# Patient Record
Sex: FEMALE | Race: WHITE | NOT HISPANIC OR LATINO | Employment: OTHER | URBAN - METROPOLITAN AREA
[De-identification: names, ages, dates, MRNs, and addresses within clinical notes are randomized per-mention and may not be internally consistent; named-entity substitution may affect disease eponyms.]

---

## 2017-06-22 ENCOUNTER — ALLSCRIPTS OFFICE VISIT (OUTPATIENT)
Dept: OTHER | Facility: OTHER | Age: 62
End: 2017-06-22

## 2017-10-03 ENCOUNTER — GENERIC CONVERSION - ENCOUNTER (OUTPATIENT)
Dept: OTHER | Facility: OTHER | Age: 62
End: 2017-10-03

## 2017-11-27 ENCOUNTER — ALLSCRIPTS OFFICE VISIT (OUTPATIENT)
Dept: OTHER | Facility: OTHER | Age: 62
End: 2017-11-27

## 2017-11-27 DIAGNOSIS — Z12.39 ENCOUNTER FOR OTHER SCREENING FOR MALIGNANT NEOPLASM OF BREAST: ICD-10-CM

## 2017-12-19 ENCOUNTER — GENERIC CONVERSION - ENCOUNTER (OUTPATIENT)
Dept: OTHER | Facility: OTHER | Age: 62
End: 2017-12-19

## 2017-12-21 DIAGNOSIS — E78.5 HYPERLIPIDEMIA: ICD-10-CM

## 2017-12-21 DIAGNOSIS — Z13.0 ENCOUNTER FOR SCREENING FOR DISEASES OF THE BLOOD AND BLOOD-FORMING ORGANS AND CERTAIN DISORDERS INVOLVING THE IMMUNE MECHANISM: ICD-10-CM

## 2018-01-10 NOTE — PROGRESS NOTES
Assessment    1  Encounter for preventive health examination (V70 0) (Z00 00)   2  Encounter for other screening for malignant neoplasm of breast (V76 19) (Z12 39)   3  Screening for malignant neoplasm of cervix (V76 2) (Z12 4)   4  Need for vaccination (V05 9) (Z23)   5  Need for hepatitis C screening test (V73 89) (Z11 59)    Plan  Encounter for other screening for malignant neoplasm of breast    · * MAMMO SCREENING BILATERAL W CAD; Status:Hold For - Scheduling; Requested  for:27Nov2017; Health Maintenance    · *VB-Depression Screening; Status:Resulted - Requires Verification;   Done: 25YUU2167  01:21PM   · After you empty your bladder, wait a moment and try to go again  Do not strain or push to  empty ; Status:Active; Requested for:27Nov2017;    · Always use a seat belt and shoulder strap when riding or driving a motor vehicle ;  Status:Complete;   Done: 85ELS0280 01:28PM   · Begin a bladder training program to help you control your urgency   Start by urinating  every 2 hours ; Status:Complete;   Done: 00KII9839 01:28PM   · Brush your teeth {freq1} and floss at least once a day ; Status:Complete;   Done:  16DMD9383 01:28PM   · Diets that are low in carbohydrates and high in protein are very popular for weight loss ;  Status:Complete;   Done: 05PSZ6635 01:28PM   · Eat a low fat and low cholesterol diet ; Status:Complete;   Done: 69AQN0667 01:28PM   · Eat foods that are high in calcium ; Status:Complete;   Done: 29SAI6007 01:28PM   · Eat no more than 30 grams of fat per day ; Status:Complete;   Done: 17YYG3431  01:28PM   · Regular aerobic exercise can help reduce stress ; Status:Complete;   Done: 41BLW2684  01:28PM   · Some eating tips that can help you lose weight ; Status:Complete;   Done: 93RBT1373  01:28PM   · Stretch and warm up your muscles during the first 10 minutes , then cool down your  muscles for the last 10 minutes of exercise ; Status:Complete;   Done: 92LQD0119  01:28PM   · The plan of care for urinary incontinence is detailed in the plan and/or discussion section  of today's note ; Status:Complete;   Done: 27UCO8051 01:28PM   · Use a sun block product with an SPF of 15 or more ; Status:Complete;   Done:  74YUL0342 01:28PM   · We have prescribed Kegel exercises to be done in a set of 15 repetitions, 3 times a day ;  Status:Complete;   Done: 86PGD5439 01:28PM   · We recommend routine visits to a dentist ; Status:Complete;   Done: 56XPQ4145  01:28PM   · We recommend that you bring your body mass index down to 26 ; Status:Complete;    Done: 10ZVL3699 01:28PM   · We recommend that you create an advance directive ; Status:Complete;   Done:  45QBG6571 01:28PM   · Call [phone] if: You find a new or different kind of lump in your breast ;  Status:Complete;   Done: 43PIX3233 01:28PM   · Call [phone] if: You have any bleeding from the vagina ; Status:Complete;    Done: 83JWQ5726 01:28PM   · Call [phone] if: You have any warning signs of skin cancer ; Status:Complete;    Done: 33EDV7465 01:28PM   · Call 911 if: You experience a new kind of chest pain (angina) or pressure ;  Status:Complete;   Done: 39RSC0325 01:28PM  Health Maintenance, Need for hepatitis C screening test    · (Q) HEPATITIS C ANTIBODY; Status:Active; Requested for:27Nov2017;   Need for vaccination    · Tdap (Adacel); INJECT 0 5  ML Intramuscular; To Be Done: 84DOR3328   · Zoster (Zostavax); INJECT 0 5  ML Intramuscular; To Be Done: 06ASO8730  Screening for malignant neoplasm of cervix    · 2 - Betty Frausto DO (Obstetrics/Gynecology) Co-Management  *  Status: Hold For  - Scheduling  Requested for: 52QNV5660  Care Summary provided  : Yes    Discussion/Summary  health maintenance visit Currently, she has an adequate exercise regimen   the risks and benefits of cervical cancer screening were discussed cervical cancer screening is current Breast cancer screening: the risks and benefits of breast cancer screening were discussed, self breast exam technique was taught, monthly self breast exam was advised and mammogram is current  Colorectal cancer screening: the risks and benefits of colorectal cancer screening were discussed and colorectal cancer screening is current  Osteoporosis screening: the risks and benefits of osteoporosis screening were discussed  The immunizations will be given as outlined in the orders  She was advised to be evaluated by an optometrist and a dentist  Advice and education were given regarding nutrition, aerobic exercise, weight bearing exercise, weight loss, calcium supplements, vitamin D supplements, self skin examination and seat belt use  Patient discussion: discussed with the patient  Hepatitis C Screening: the patient was counseled on Hepatitis C screening  The patient agrees to Hepatitis C screening  Possible side effects of new medications were reviewed with the patient/guardian today  The treatment plan was reviewed with the patient/guardian  The patient/guardian understands and agrees with the treatment plan      Chief Complaint  wellness physical      History of Present Illness  HM, Adult Female: The patient is being seen for a health maintenance evaluation  The last health maintenance visit was 1 year(s) ago  Social History: She is   Work status: working full time  The patient has never smoked cigarettes  She reports rare alcohol use  She has never used illicit drugs  General Health: The patient's health since the last visit is described as good  She has regular dental visits  She denies vision problems  She denies hearing loss  Immunizations status: not up to date  Lifestyle:  She consumes a diverse and healthy diet  She has weight concerns  She exercises regularly  She does not use tobacco  She denies alcohol use  She denies drug use  Reproductive health:  she reports normal menses  she is not sexually active     Screening:   HPI: Patient is here for well adult PE She is in need of a new GYN She needs slip for mammogram She is up to date with colonscopy and with her labs She needs vaccines She is seeing dentist regularly and in fact had a tooth break and is having an implant      Review of Systems    Constitutional: No fever, no chills, feels well, no tiredness, no recent weight gain or weight loss  Eyes: no eye pain and no eyesight problems  ENT: no complaints of earache, no loss of hearing, no nose bleeds, no nasal discharge, no sore throat, no hoarseness  Cardiovascular: no chest pain, no intermittent leg claudication, no palpitations and no lower extremity edema  Respiratory: No complaints of shortness of breath, no wheezing, no cough, no SOB on exertion, no orthopnea, no PND  Gastrointestinal: No complaints of abdominal pain, no constipation, no nausea or vomiting, no diarrhea, no bloody stools  Genitourinary: no dysuria and no incontinence  Musculoskeletal: no arthralgias and no myalgias  Integumentary: no rashes  Neurological: No complaints of headache, no confusion, no convulsions, no numbness, no dizziness or fainting, no tingling, no limb weakness, no difficulty walking  Psychiatric: no anxiety, no sleep disturbances and no depression  Active Problems    1  Arthritis of right shoulder region (716 91) (M19 011)   2  Basal cell carcinoma of nose (173 31) (C44 311)   3  Disc degeneration, lumbar (722 52) (M51 36)   4  Hyperlipidemia (272 4) (E78 5)   5  Need for vaccination (V05 9) (Z23)   6  Obesity (278 00) (E66 9)   7   Pain of right scapula (733 90) (M89 8X1)    Past Medical History    · History of Achilles tendinitis of right lower extremity (726 71) (M76 61)   · History of hyperlipidemia (V12 29) (Z86 39)   · Denied: History of substance abuse   · History of Left-sided low back pain with left-sided sciatica (724 3) (M54 42)   · History of Sciatica (724 3) (M54 30)   · History of Spasm of lumbar paraspinous muscle (724 8) (M62 830)    Surgical History    · History of Complete Colonoscopy    Family History  Mother    · Family history of Hypertension (V17 49)   · Family history of Type 2 Diabetes Mellitus  Father    · Family history of Myocardial Ischemia  Brother    · Family history of Type 2 Diabetes Mellitus    Social History    · Never A Smoker    Current Meds   1  Celecoxib 200 MG Oral Capsule; TAKE ONE CAPSULE BY MOUTH TWICE DAILY for   one week then one a day; Therapy: 64AIA8041 to (Flory Potter)  Requested for: 51SWR1938; Last   Rx:03Oct2017 Ordered    Allergies    1  No Known Drug Allergies    Vitals   Recorded: 52HWA8095 80:46MJ   Systolic 395   Diastolic 82   Height 5 ft 4 in   Weight 186 lb    BMI Calculated 31 93   BSA Calculated 1 9     Physical Exam    Constitutional   General appearance: No acute distress, well appearing and well nourished  Head and Face   Head and face: Normal     Palpation of the face and sinuses: No sinus tenderness  Eyes   Conjunctiva and lids: No swelling, erythema or discharge  Pupils and irises: Equal, round, reactive to light  Ophthalmoscopic examination: Normal fundi and optic discs  Ears, Nose, Mouth, and Throat   External inspection of ears and nose: Normal     Otoscopic examination: Tympanic membranes translucent with normal light reflex  Canals patent without erythema  Hearing: Normal     Nasal mucosa, septum, and turbinates: Normal without edema or erythema  Lips, teeth, and gums: Normal, good dentition  Oropharynx: Normal with no erythema, edema, exudate or lesions  Neck   Neck: Supple, symmetric, trachea midline, no masses  Thyroid: Normal, no thyromegaly  Pulmonary   Respiratory effort: No increased work of breathing or signs of respiratory distress  Auscultation of lungs: Clear to auscultation  Cardiovascular   Palpation of heart: Normal PMI, no thrills  Carotid pulses: 2+ bilaterally      Examination of extremities for edema and/or varicosities: Normal     Abdomen Abdomen: Non-tender, no masses  Liver and spleen: No hepatomegaly or splenomegaly  Lymphatic   Palpation of lymph nodes in neck: No lymphadenopathy  Musculoskeletal   Gait and station: Normal     Stability: Normal     Muscle strength/tone: Normal     Skin   Skin and subcutaneous tissue: Normal without rashes or lesions  Neurologic   Cranial nerves: Cranial nerves II-XII intact  Coordination: Normal finger to nose and heel to shin  Psychiatric   Judgment and insight: Normal     Orientation to person, place, and time: Normal     Recent and remote memory: Intact      Mood and affect: Normal        Results/Data  *VB-Depression Screening 92XCR2949 01:21PM Gabby Simmons     Test Name Result Flag Reference   Depression Scale Result      Depression Screen - Negative For Symptoms       Signatures   Electronically signed by : Nickolas Nair DO; Nov 27 2017  1:29PM EST                       (Author)

## 2018-01-12 NOTE — PROGRESS NOTES
Assessment    1  Disc degeneration, lumbar (722 52) (M51 36)   2  Spasm of lumbar paraspinous muscle (724 8) (M62 830)   3  Left-sided low back pain with left-sided sciatica (724 3) (M54 42)    Plan  Disc degeneration, lumbar    · After 3 days, apply heat 4 times a day for 20 minutes ; Status:Complete;   Done:  38BNP1316   · Apply an ice pack 4 times a day for 20 minutes the first 2 days ; Status:Complete;   Done:  43CYZ0552   · Avoid activities that cause or worsen the pain ; Status:Complete;   Done: 27HWS9833   · Avoid lifting, manual work, or sports that may affect your injury for 3 weeks ;  Status:Complete;   Done: 29HJL2757   · Do not resume your normal level of activity until you have been rechecked ;  Status:Complete;   Done: 93IJU5543   · Rest the injured area as much as possible ; Status:Complete;   Done: 94NUQ8091   · There are several things you can do to help your back heal and stay healthy ;  Status:Complete;   Done: 26QFC6387   · We recommend that you do knee-to-chest exercises to stretch your back  Do this  exercise 10 times in a row, 3 times a day , and hold the stretch  for 20 seconds each time ; Status:Complete;   Done: 03ZXX2154   · We recommend that you stretch your back by doing the cat stretch  Do this exercise 10  times a day, holding the stretch for 20 seconds each time ; Status:Complete;   Done:  83MBH5216   · We recommend that you stretch your lower back muscles lying down  Do this exercise 3  times in a row, 3 times a day , and hold the stretch for 20 seconds each  time ; Status:Complete;   Done: 51DKO5313   · We recommend that you stretch your middle back  Do this exercise 5 times a day,  holding the stretch for 10 seconds each time ; Status:Complete;   Done: 69ZWC7470   · Call [phone] if: The pain is not better in 1 week ; Status:Complete;   Done:  99ETL9833   · Call [phone] if: The pain seems worse ; Status:Complete;   Done: 46GIK1172   · Call [phone] if:  The symptoms are not better in 6 weeks ; Status:Complete;    Done: 96ZIP5947   · Call [phone] if: The symptoms are suddenly worse ; Status:Complete;   Done:  60YYZ4868   · Call [phone] if: You get a rash ; Status:Complete;   Done: 20ZGH7476   · Call [phone] if: Your bowel movements are hard, difficult to push out, or if several  days have gone by without a bowel movement ; Status:Complete;   Done: 88CQW6097   · Call [phone] if: Your leg is numb, cold, or tingling ; Status:Complete;   Done:  69BBS5204   · Call [phone] if: You have any loss of bowel or bladder control ; Status:Complete;   Done:  90EQR8371   · Seek Immediate Medical Attention if: Your leg becomes weak ; Status:Complete;   Done:  11XUI5355  Disc degeneration, lumbar, Left-sided low back pain with left-sided sciatica, Spasm of  lumbar paraspinous muscle    · Celecoxib 100 MG Oral Capsule (CeleBREX); 1 tablet daily for 10 days then as  needed   · Ketorolac Tromethamine 30 MG/ML Injection Solution; inject 1  ml  intramuscular; To Be Done: 59UUR3110  Spasm of lumbar paraspinous muscle    · Cyclobenzaprine HCl - 10 MG Oral Tablet; 1 tablet every 8 hrs for 3 days then prn  pain    Discussion/Summary    Patient will continue with the stretches, see orthopedics on Thursday and have the tordol shot Patient will need to have the celebrex and muscle relaxants        Chief Complaint  HERE TODAY WITH C/O LOW BACK PAIN FROM CLEANING SNOW OFF CAR      History of Present Illness  HPI: Jose Alicia has known lumbar disc disease and follows with [facility] for it Patient has an appointment with them on Thursday Patient is having pain in the left low back that radiates to the left buttock, lateral hip and thigh Patient feels some "tingling" also Patient has no bowel or bladder complaints Patient feels her strenght is normal Patient was fine until Sunday Then she went out to clear the snow from her SUV and started with pain Patient tried her stretches and also he alleve and massage chair with no help Patient felt the worst this morning with trying to get out of bed Patient states pain is better with some movement   Back Pain (Follow-Up): The patient is being seen for follow-up of a lumbar strain and degenerative disc disease  The patient reports doing poorly  Comorbid Illnesses: sedentary  Interval symptoms:  denies upper back pain, denies mid back pain, worsened back stiffness, worsened buttock pain, denies lower extremity pain, denies lower extremity paresthesias and denies lower extremity weakness    The patient presents with complaints of sudden onset of constant episodes of moderate worsened lower back pain  Episodes started about 3 days ago  She is currently experiencing worsened lower back pain  Symptoms are unchanged  Previous Evaluation: MRI, PT and ortho  Associated symptoms:  no incontinence and no urinary retention  Pain levels include a current pain level of 7/10  Medications include nonsteroidal anti-inflammatory drugs and alleve twice daily since Sunday  Medications:  the patient is adherent to her medication regimen, but she denies medication side effects  Review of Systems    Constitutional: no fever and no chills  Gastrointestinal: no complaints of abdominal pain, no constipation, no nausea or diarrhea, no vomiting, no bloody stools  Genitourinary: no complaints of dysuria, no incontinence, no pelvic pain, no dysmenorrhea, no vaginal discharge or abnormal vaginal bleeding  Musculoskeletal: as noted in HPI  Integumentary: no rashes  Neurological: no numbness  Active Problems    1  Arthritis of right shoulder region (716 91) (M19 90)   2  Disc degeneration, lumbar (722 52) (M51 36)   3  Hyperlipidemia (272 4) (E78 5)   4  Obesity (278 00) (E66 9)   5  Upper respiratory infection (465 9) (J06 9)   6  Visit for screening mammogram (V76 12) (Z12 31)    Past Medical History    1  History of Acute sinusitis (461 9) (J01 90)   2   History of Depression (311) (F32 9)   3  History of acute pharyngitis (V12 69) (Z87 09)   4  History of acute sinusitis (V12 69) (Z87 09)   5  History of acute sinusitis (V12 69) (Z87 09)   6  History of acute sinusitis (V12 69) (Z87 09)   7  History of upper respiratory infection (V12 09) (Z87 09)   8  History of Pain in wrist, unspecified laterality (719 43) (M25 539)   9  History of Pain of finger, unspecified laterality (729 5) (M79 646)   10  History of Sciatica (724 3) (M54 30)  Active Problems And Past Medical History Reviewed: The active problems and past medical history were reviewed and updated today  Family History    1  Family history of Hypertension (V17 49)   2  Family history of Type 2 Diabetes Mellitus    3  Family history of Myocardial Ischemia    4  Family history of Type 2 Diabetes Mellitus  Family History Reviewed: The family history was reviewed and updated today  Social History    · Never A Smoker  The social history was reviewed and is unchanged  Surgical History    1  History of Complete Colonoscopy  Surgical History Reviewed: The surgical history was reviewed and updated today  Current Meds   1  Simvastatin 10 MG Oral Tablet; take 1 tablet every day; Therapy: 81WOO2052 to (Evaluate:20Jun2015)  Requested for: 25Sai4450; Last   Rx:30Kpu0173 Ordered   2  Ventolin  (90 Base) MCG/ACT Inhalation Aerosol Solution; inhale 2 puffs every 6   hrs as needed for cough and wheezing; Therapy: 40AUZ4990 to (Mikhail Calixto)  Requested for: 78HSP3611; Last   Rx:02Jan2014 Ordered    The medication list was reviewed and updated today  Allergies    1  No Known Drug Allergies    Vitals   Recorded: 98MTJ4207 12:03PM   Temperature 84 6 F   Systolic 988   Diastolic 70   Height 5 ft 4 in   Weight 194 lb 8 oz   BMI Calculated 33 39   BSA Calculated 1 93     Physical Exam    Constitutional   General appearance: No acute distress, well appearing and well nourished  Cardiovascular   Examination of extremities for edema and/or varicosities: Normal     Musculoskeletal   Gait and station: Abnormal   antalgic gait  Inspection/palpation of joints, bones, and muscles: Abnormal   pain to palpation of the left PSIS joint flexionn is limited to 30 degrees, extension to 20 and side bending/ rotation b/l at 30 degrees Patient is able to toe and heel walk  Neurologic   Cranial nerves: Cranial nerves 2-12 intact  Reflexes: 2+ and symmetric  Sensation: No sensory loss  Psychiatric   Orientation to person, place, and time: Normal     Mood and affect: Normal     Additional Exam:  5/5 strenght b/l lower extremities          Signatures   Electronically signed by : Sherie Love DO; Jan 26 2016 12:27PM EST                       (Author)

## 2018-01-13 VITALS
HEIGHT: 64 IN | SYSTOLIC BLOOD PRESSURE: 118 MMHG | WEIGHT: 181.25 LBS | BODY MASS INDEX: 30.94 KG/M2 | DIASTOLIC BLOOD PRESSURE: 78 MMHG

## 2018-01-15 VITALS
HEIGHT: 64 IN | DIASTOLIC BLOOD PRESSURE: 82 MMHG | SYSTOLIC BLOOD PRESSURE: 140 MMHG | BODY MASS INDEX: 31.76 KG/M2 | WEIGHT: 186 LBS

## 2018-01-22 VITALS
SYSTOLIC BLOOD PRESSURE: 124 MMHG | WEIGHT: 184 LBS | BODY MASS INDEX: 31.41 KG/M2 | DIASTOLIC BLOOD PRESSURE: 80 MMHG | HEIGHT: 64 IN

## 2018-01-24 VITALS
SYSTOLIC BLOOD PRESSURE: 124 MMHG | TEMPERATURE: 98.2 F | HEIGHT: 64 IN | WEIGHT: 186 LBS | DIASTOLIC BLOOD PRESSURE: 78 MMHG | BODY MASS INDEX: 31.76 KG/M2

## 2018-02-06 ENCOUNTER — OFFICE VISIT (OUTPATIENT)
Dept: FAMILY MEDICINE CLINIC | Facility: CLINIC | Age: 63
End: 2018-02-06
Payer: COMMERCIAL

## 2018-02-06 VITALS
TEMPERATURE: 98.2 F | SYSTOLIC BLOOD PRESSURE: 120 MMHG | DIASTOLIC BLOOD PRESSURE: 78 MMHG | BODY MASS INDEX: 32.06 KG/M2 | WEIGHT: 187.8 LBS | HEIGHT: 64 IN

## 2018-02-06 DIAGNOSIS — J02.9 PHARYNGITIS, UNSPECIFIED ETIOLOGY: Primary | ICD-10-CM

## 2018-02-06 LAB — S PYO AG THROAT QL: NEGATIVE

## 2018-02-06 PROCEDURE — 99213 OFFICE O/P EST LOW 20 MIN: CPT | Performed by: FAMILY MEDICINE

## 2018-02-06 PROCEDURE — 87880 STREP A ASSAY W/OPTIC: CPT | Performed by: FAMILY MEDICINE

## 2018-02-06 PROCEDURE — 3008F BODY MASS INDEX DOCD: CPT | Performed by: FAMILY MEDICINE

## 2018-02-06 RX ORDER — CELECOXIB 200 MG/1
1 CAPSULE ORAL DAILY
COMMUNITY
Start: 2017-10-03 | End: 2018-10-30

## 2018-02-06 RX ORDER — ALPRAZOLAM 0.5 MG/1
.5-1 TABLET ORAL
COMMUNITY
Start: 2016-04-08 | End: 2018-10-30

## 2018-02-06 NOTE — PROGRESS NOTES
Assessment/Plan:    No problem-specific Assessment & Plan notes found for this encounter  Diagnoses and all orders for this visit:    Pharyngitis, unspecified etiology  Comments:  strep culture is negative This appear to be viral If she gets worse or no improvement by Friday she will call Cepacol lozenges, rest and fluids are recommended  Orders:  -     POCT rapid strepA    Other orders  -     ALPRAZolam (XANAX) 0 5 mg tablet; Take 0 5-1 tablets by mouth  -     celecoxib (CeleBREX) 200 mg capsule; Take 1 capsule by mouth daily          Subjective:   Chief Complaint   Patient presents with    Fatigue    Sore Throat          Patient ID: Shon Zurita is a 65 y o  female  Patient ahs had some fatigue and congestion on and off for a ew days then over the weekend sore throat She is travelling next week and wanted to have things "checked" Patient has no fever and no discolored mucous She was in [place] and had crowns placed in the last 2 weeks      URI    This is a new problem  The current episode started in the past 7 days  The problem has been unchanged  There has been no fever  Associated symptoms include rhinorrhea and a sore throat  Pertinent negatives include no abdominal pain, chest pain, congestion, coughing, diarrhea, dysuria, ear pain, headaches, joint pain, joint swelling, nausea, neck pain, plugged ear sensation, rash, sinus pain, sneezing, swollen glands, vomiting or wheezing  She has tried antihistamine for the symptoms  The treatment provided mild relief  The following portions of the patient's history were reviewed and updated as appropriate: allergies, current medications, past social history and problem list     Review of Systems   Constitutional: Positive for fatigue  Negative for activity change, appetite change, chills, diaphoresis and fever  HENT: Positive for postnasal drip, rhinorrhea and sore throat   Negative for congestion, ear pain, sinus pain, sinus pressure, sneezing, trouble swallowing and voice change  Respiratory: Negative for cough and wheezing  Cardiovascular: Negative for chest pain  Gastrointestinal: Negative for abdominal pain, diarrhea, nausea and vomiting  Genitourinary: Negative for dysuria  Musculoskeletal: Negative for joint pain and neck pain  Skin: Negative for rash  Neurological: Negative for headaches  Objective:     Physical Exam   Constitutional: She is oriented to person, place, and time  She appears well-developed and well-nourished  HENT:   Head: Normocephalic and atraumatic  Right Ear: Tympanic membrane and external ear normal    Left Ear: Tympanic membrane and external ear normal    Nose: Rhinorrhea present  Mouth/Throat: Uvula is midline and mucous membranes are normal  Posterior oropharyngeal erythema present  No oropharyngeal exudate, posterior oropharyngeal edema or tonsillar abscesses  Eyes: Conjunctivae and EOM are normal  Pupils are equal, round, and reactive to light  Neck: Normal range of motion  Neck supple  Cardiovascular: Normal rate, regular rhythm and normal heart sounds  Pulmonary/Chest: Effort normal and breath sounds normal  She has no wheezes  She has no rales  Abdominal: Soft  Bowel sounds are normal    Lymphadenopathy:     She has no cervical adenopathy  Neurological: She is alert and oriented to person, place, and time  She has normal reflexes  Skin: No rash noted  Psychiatric: She has a normal mood and affect

## 2018-02-06 NOTE — PATIENT INSTRUCTIONS
Pharyngitis   AMBULATORY CARE:   Pharyngitis , or sore throat, is inflammation of the tissues and structures in your pharynx (throat)  Pharyngitis is most often caused by bacteria  It may also be caused by a cold or flu virus  Other causes include smoking, allergies, or acid reflux  Signs and symptoms that may occur with pharyngitis:   · Sore throat or pain when you swallow    · Fever, chills, and body aches    · Hoarse or raspy voice    · Cough, runny or stuffy nose, itchy or watery eyes    · Headache    · Upset stomach and loss of appetite    · Mild neck stiffness    · Swollen glands that feel like hard lumps when you touch your neck    · White and yellow pus-filled blisters in the back of your throat  Call 911 for any of the following:   · You have trouble breathing or swallowing because your throat is swollen or sore  Seek care immediately if:   · You are drooling because it hurts too much to swallow  · Your fever is higher than 102? F (39?C) or lasts longer than 3 days  · You are confused  · You taste blood in your throat  Contact your healthcare provider if:   · Your throat pain gets worse  · You have a painful lump in your throat that does not go away after 5 days  · Your symptoms do not improve after 5 days  · You have questions or concerns about your condition or care  Treatment for pharyngitis:  Viral pharyngitis will go away on its own without treatment  Your sore throat should start to feel better in 3 to 5 days for both viral and bacterial infections  You may need any of the following:  · Antibiotics  treat a bacterial infection  · NSAIDs , such as ibuprofen, help decrease swelling, pain, and fever  NSAIDs can cause stomach bleeding or kidney problems in certain people  If you take blood thinner medicine, always ask your healthcare provider if NSAIDs are safe for you  Always read the medicine label and follow directions  · Acetaminophen  decreases pain and fever   It is available without a doctor's order  Ask how much to take and how often to take it  Follow directions  Acetaminophen can cause liver damage if not taken correctly  Manage your symptoms:   · Gargle salt water  Mix ¼ teaspoon salt in an 8 ounce glass of warm water and gargle  This may help decrease swelling in your throat  · Drink liquids as directed  You may need to drink more liquids than usual  Liquids may help soothe your throat and prevent dehydration  Ask how much liquid to drink each day and which liquids are best for you  · Use a cool-steam humidifier  to help moisten the air in your room and calm your cough  · Soothe your throat  with cough drops, ice, soft foods, or popsicles  Prevent the spread of pharyngitis:  Cover your mouth and nose when you cough or sneeze  Do not share food or drinks  Wash your hands often  Use soap and water  If soap and water are unavailable, use an alcohol based hand   Follow up with your healthcare provider as directed:  Write down your questions so you remember to ask them during your visits  © 2017 [facility] Information is for End User's use only and may not be sold, redistributed or otherwise used for commercial purposes  All illustrations and images included in CareNotes® are the copyrighted property of [employer]  or Dereje Stokes  The above information is an  only  It is not intended as medical advice for individual conditions or treatments  Talk to your doctor, nurse or pharmacist before following any medical regimen to see if it is safe and effective for you

## 2018-10-16 ENCOUNTER — TELEPHONE (OUTPATIENT)
Dept: FAMILY MEDICINE CLINIC | Facility: CLINIC | Age: 63
End: 2018-10-16

## 2018-10-16 DIAGNOSIS — E78.2 MIXED HYPERLIPIDEMIA: Primary | ICD-10-CM

## 2018-10-18 DIAGNOSIS — E78.2 MIXED HYPERLIPIDEMIA: ICD-10-CM

## 2018-10-18 DIAGNOSIS — Z13.0 SCREENING FOR DEFICIENCY ANEMIA: ICD-10-CM

## 2018-10-18 DIAGNOSIS — Z11.59 ENCOUNTER FOR HEPATITIS C SCREENING TEST FOR LOW RISK PATIENT: Primary | ICD-10-CM

## 2018-10-23 LAB — HCV AB SER-ACNC: NEGATIVE

## 2018-10-30 ENCOUNTER — OFFICE VISIT (OUTPATIENT)
Dept: FAMILY MEDICINE CLINIC | Facility: CLINIC | Age: 63
End: 2018-10-30
Payer: COMMERCIAL

## 2018-10-30 VITALS
HEIGHT: 64 IN | SYSTOLIC BLOOD PRESSURE: 130 MMHG | WEIGHT: 187 LBS | DIASTOLIC BLOOD PRESSURE: 78 MMHG | BODY MASS INDEX: 31.92 KG/M2

## 2018-10-30 DIAGNOSIS — M67.972 ACHILLES TENDON DISORDER, LEFT: ICD-10-CM

## 2018-10-30 DIAGNOSIS — Z23 NEED FOR VACCINATION: ICD-10-CM

## 2018-10-30 DIAGNOSIS — E78.2 MIXED HYPERLIPIDEMIA: Primary | ICD-10-CM

## 2018-10-30 DIAGNOSIS — E66.09 CLASS 1 OBESITY DUE TO EXCESS CALORIES WITHOUT SERIOUS COMORBIDITY WITH BODY MASS INDEX (BMI) OF 32.0 TO 32.9 IN ADULT: ICD-10-CM

## 2018-10-30 DIAGNOSIS — C44.311 BASAL CELL CARCINOMA OF NOSE: ICD-10-CM

## 2018-10-30 DIAGNOSIS — Z13.21 ENCOUNTER FOR VITAMIN DEFICIENCY SCREENING: ICD-10-CM

## 2018-10-30 PROCEDURE — 90471 IMMUNIZATION ADMIN: CPT

## 2018-10-30 PROCEDURE — 90682 RIV4 VACC RECOMBINANT DNA IM: CPT

## 2018-10-30 PROCEDURE — 3008F BODY MASS INDEX DOCD: CPT | Performed by: FAMILY MEDICINE

## 2018-10-30 PROCEDURE — 1036F TOBACCO NON-USER: CPT | Performed by: FAMILY MEDICINE

## 2018-10-30 PROCEDURE — 99214 OFFICE O/P EST MOD 30 MIN: CPT | Performed by: FAMILY MEDICINE

## 2018-10-30 RX ORDER — PHENOL 1.4 %
600 AEROSOL, SPRAY (ML) MUCOUS MEMBRANE 2 TIMES DAILY WITH MEALS
COMMUNITY

## 2018-10-30 NOTE — ASSESSMENT & PLAN NOTE
Discussed diet and exerfcise Discussed also stress eating and the need to do meal planning Patint will be resuming her exercise program now that her ankle is improving Patient to see me in 6 months

## 2018-10-30 NOTE — ASSESSMENT & PLAN NOTE
Disuussed LDL increase and the HDL decrease Risk ratio is stable Patient is at average risk for cardiac event patient has proven she can with diet nad exercise decrease weight and decrease the LDL

## 2018-10-30 NOTE — PATIENT INSTRUCTIONS
Cholesterol and Your Health   WHAT YOU NEED TO KNOW:   What is cholesterol? Cholesterol is a waxy, fat-like substance  Cholesterol is made by your body, but also comes from certain foods you eat  Your body uses cholesterol to make hormones and new cells  Your body also uses cholesterol to protect nerves  Cholesterol comes from foods such as meat and dairy products  Your total cholesterol level is made up by LDL cholesterol, HDL cholesterol, and triglycerides:  · LDL cholesterol  is called bad cholesterol  because it forms plaque in your arteries  As plaque builds up, your arteries become narrow, and less blood flows through  When plaque decreases blood flow to your heart, you may have chest pain  If plaque completely blocks an artery that bring blood to your heart, you may have a heart attack  Plaque can break off and form blood clots  Blood clots may block arteries in your brain and cause a stroke  · HDL cholesterol  is called good cholesterol  because it helps remove LDL cholesterol from your arteries  It does this by attaching to LDL cholesterol and carrying it to your liver  Your liver breaks down LDL cholesterol so your body can get rid of it  High levels of HDL cholesterol can help prevent a heart attack and stroke  Low levels of HDL cholesterol can increase your risk for heart disease, heart attack, and stroke  · Triglycerides  are a type of fat that store energy from foods you eat  High levels of triglycerides also cause plaque buildup  This can increase your risk for a heart attack or stroke  If your triglyceride level is high, your LDL cholesterol level may also be high  How does food affect my cholesterol levels? · Unhealthy fats  increase LDL cholesterol and triglyceride levels in your blood  They are found in foods high in cholesterol, saturated fat, and trans fat:     ¨ Cholesterol  is found in eggs, dairy, and meat       ¨ Saturated fat  is found in butter, cheese, ice cream, whole milk, and coconut oil  Saturated fat is also found in meat, such as sausage, hot dogs, and bologna  ¨ Trans fat  is found in liquid oils and is used in fried and baked foods  Foods that contain trans fats include chips, crackers, muffins, sweet rolls, microwave popcorn, and cookies  · Healthy fats,  also called unsaturated fats, help lower LDL cholesterol and triglyceride levels  Healthy fats include the following:     ¨ Monounsaturated fats  are found in foods such as olive oil, canola oil, avocado, nuts, and olives  ¨ Polyunsaturated fats,  such as omega 3 fats, are found in fish, such as salmon, trout, and tuna  They can also be found in plant foods such as flaxseed, walnuts, and soybeans  What other things affect my cholesterol levels? · Smoking cigarettes    · Being overweight or obese     · Drinking large amounts of alcohol    · Not enough exercise or no exercise    · Certain genes passed from your parents to you  What do I need to know about having my cholesterol checked? Adults 21to 39years of age should have their cholesterol levels checked every 4 to 6 years  Adults 45 years and older should have their cholesterol checked every 1 to 2 years  You may need your cholesterol checked more often, or at a younger age, if you have risk factors for heart disease  You may also need to have your cholesterol checked more often if you have other health conditions, such as diabetes  Blood tests are used to check cholesterol levels  Blood tests measure your levels of triglycerides, LDL cholesterol, and HDL cholesterol  What should my cholesterol level be? Your cholesterol level goal may depend on your risk for heart disease  It may also depend on your age and other health conditions  Ask your healthcare provider if the following goals are right for you:  · Your total cholesterol level  should be less than 200 mg/dL  This number may also depend on your HDL and LDL cholesterol goals       · Your LDL cholesterol level  should be less than 130 mg/dL  · Your HDL cholesterol level  should be 60 mg/dL or higher  · Your triglyceride level  should be less than 150 mg/dL  How is high cholesterol treated? Treatment for high cholesterol will also decrease your risk of heart disease, heart attack, and stroke  Treatment may include any of the following:  · Medicines  may be given to lower your LDL cholesterol, triglyceride levels, or total cholesterol level  You may need medicines to lower your cholesterol if any of the following is true:     ¨ You have a history of stroke, TIA, unstable angina, or a heart attack    ¨ Your LDL cholesterol level is 190 mg/dL or higher    ¨ You are age 36to 76years of age, have diabetes, and your LDL cholesterol is 70 mg/dL or higher    ¨ You are age 36to 76years of age, have risk factors for heart disease, and your LDL cholesterol is 70 mg/dL or higher    · Lifestyle changes  include changes to your diet, exercise, weight loss, and quitting smoking  It also includes decreasing the amount of alcohol you drink  · Supplements  include fish oil, red yeast rice, and garlic  Fish oil may help lower your triglyceride and LDL cholesterol levels  It may also increase your HDL cholesterol level  Red yeast rice may help decrease your total cholesterol level and LDL cholesterol level  Garlic may help lower your total cholesterol level  Do not take these supplements without talking to your healthcare provider  What changes can I make to the foods I eat to lower my cholesterol levels? A registered dietitian can help you create a healthy eating plan  Read food labels and choose foods low in saturated fat, trans fats, and cholesterol  · Decrease the total amount of fat you eat  Choose lean meats, fat-free or 1% fat milk, and low-fat dairy products, such as yogurt and cheese  Try to limit or avoid red meats  Limit or do not eat fried foods or baked goods such as cookies       · Replace unhealthy fats with healthy fats  Cook foods in olive oil or canola oil  Choose soft margarines that are low in saturated fat and trans fat  Seeds, nuts, and avocados are other examples of healthy fats  · Eat foods with omega-3 fats  Examples include salmon, tuna, mackerel, walnuts, and flaxseed  Eat fish 2 times per week  Children and pregnant women should not eat fish that have high levels of mercury, such as shark, swordfish, and sea mackerel  · Increase the amount of plant-based foods you eat  Plant-based foods are low in cholesterol and fat  Eating more of these foods may help lower your cholesterol and help you lose weight  Examples of plant-based foods includes fruits, vegetables, legumes, and whole grains  Replace milk that contains dairy with almond, soy, or coconut milk  Eat beans and foods with soy for protein instead of meat  Ask your healthcare provider or dietitian for more information on plant-based foods  · Increase the amount of fiber you eat  High-fiber foods can help lower your LDL cholesterol  You should eat between 20 and 30 grams of fiber each day  Eat at least 5 servings of fruits and vegetables each day  Other examples of high-fiber foods include whole-grain or whole-wheat breads, pastas, or cereals, and brown rice  Eat 3 ounces of whole-grain foods each day  Increase fiber slowly  You may have abdominal discomfort, bloating, and gas if you add fiber to your diet too quickly  What lifestyle changes can I make to lower my cholesterol levels? · Maintain a healthy weight  Ask your healthcare provider how much you should weigh  Ask him or her to help you create a weight loss plan if you are overweight  Weight loss can decrease your total cholesterol and triglyceride levels  · Exercise regularly  Exercise can help lower your total cholesterol level and maintain a healthy weight  Exercise can also help increase your HDL cholesterol level   Work with your healthcare provider to create an exercise program that is right for you  Get at least 30 minutes of moderate exercise most days of the week  Examples of exercise include brisk walking, swimming, or biking  · Do not smoke  Nicotine and other chemicals in cigarettes and cigars can damage your lungs, heart, and blood vessels  They can also raise your triglyceride levels  Ask your healthcare provider for information if you currently smoke and need help to quit  E-cigarettes or smokeless tobacco still contain nicotine  Talk to your healthcare provider before you use these products  · Limit or do not drink alcohol  Alcohol can increase your triglyceride levels  Ask your healthcare provider if it is safe for you to drink alcohol  Also ask how much is safe for you to drink each day  CARE AGREEMENT:   You have the right to help plan your care  Discuss treatment options with your caregivers to decide what care you want to receive  You always have the right to refuse treatment  The above information is an  only  It is not intended as medical advice for individual conditions or treatments  Talk to your doctor, nurse or pharmacist before following any medical regimen to see if it is safe and effective for you  © 2017 [address] [employer] Information is for End User's use only and may not be sold, redistributed or otherwise used for commercial purposes  All illustrations and images included in CareNotes® are the copyrighted property of [employer]  or Dereje Stokes

## 2018-10-30 NOTE — PROGRESS NOTES
Assessment/Plan:    Achilles tendon disorder, left  Continue with follwoup with Dr Tila Whitlock and continue with the physical therapy    Basal cell carcinoma of nose  Patient to continue with the q 6 month follwoup with dermatology    Class 1 obesity due to excess calories without serious comorbidity with body mass index (BMI) of 32 0 to 32 9 in adult  Discussed diet and exerfcise Discussed also stress eating and the need to do meal planning Patint will be resuming her exercise program now that her ankle is improving Patient to see me in 6 months    Mixed hyperlipidemia  Disuussed LDL increase and the HDL decrease Risk ratio is stable Patient is at average risk for cardiac event patient has proven she can with diet nad exercise decrease weight and decrease the LDL       Diagnoses and all orders for this visit:    Mixed hyperlipidemia  -     Comprehensive metabolic panel; Future  -     Lipid panel; Future    Class 1 obesity due to excess calories without serious comorbidity with body mass index (BMI) of 32 0 to 32 9 in adult    Basal cell carcinoma of nose    Achilles tendon disorder, left    Encounter for vitamin deficiency screening  -     Vitamin D 25 hydroxy; Future    Need for vaccination  -     influenza vaccine, [id number], quadrivalent, recombinant, PF, 0 5 mL, for patients 18 yr+ (FLUBLOK)    Other orders  -     calcium carbonate (OS-STELLA) 600 MG tablet; Take 600 mg by mouth 2 (two) times a day with meals          Subjective:   Chief Complaint   Patient presents with    Follow-up     review blood work           Patient ID: Dinah Gutierrez is a 59 y o  female      Patient is here for checkup of her hyperlipidemia, obesity, basal cell CA of the nose ad her left achilles tendonitis patient is  Having conitnued stress at work She has had decrease in exercise also due to the ankle injury patient is seeing Dr Sergio Maher and having physical therapy for it Patient notes some improvement she is planning to go back to her exercise program Her HDL has decreased to 54 and LDL is in the 150's Her ratio is < 5 so at this time I feel exerise and diet will help patient weight is stable However she needs to work on weight loss Patient has appt with GYN and will have repeat mammogram in 2019 patient is also seeing Gi for colon cancer screening shortly She is seeing dermatology every 6 months She is takin vitamin D and needs those labs checked        The following portions of the patient's history were reviewed and updated as appropriate: allergies, current medications, past social history and problem list     Review of Systems   Constitutional: Negative for activity change, appetite change, chills, fatigue and fever  HENT: Negative for congestion, ear discharge, ear pain, hearing loss, postnasal drip, sinus pressure, sore throat and trouble swallowing  Eyes: Negative for pain, discharge, redness, itching and visual disturbance  Respiratory: Negative for cough, chest tightness and shortness of breath  Cardiovascular: Negative for chest pain, palpitations and leg swelling  Gastrointestinal: Negative for abdominal pain, blood in stool, constipation, diarrhea, nausea and vomiting  Endocrine: Negative for cold intolerance, heat intolerance, polydipsia, polyphagia and polyuria  Genitourinary: Negative for difficulty urinating, dysuria, menstrual problem, urgency, vaginal bleeding and vaginal discharge  Musculoskeletal: Negative for arthralgias, back pain, gait problem, myalgias, neck pain and neck stiffness  Left ankle and heel pain due to achilles tendonitis   Skin: Negative for rash and wound  Allergic/Immunologic: Negative for environmental allergies and food allergies  Neurological: Negative for dizziness, tremors, seizures, weakness and headaches  Hematological: Negative for adenopathy  Does not bruise/bleed easily  Psychiatric/Behavioral: Negative for confusion and sleep disturbance   The patient is not nervous/anxious  Objective:      /78   Ht 5' 4" (1 626 m)   Wt 84 8 kg (187 lb)   BMI 32 10 kg/m²          Physical Exam   Constitutional: She is oriented to person, place, and time  She appears well-developed and well-nourished  HENT:   Head: Normocephalic and atraumatic  Right Ear: External ear normal    Left Ear: External ear normal    Nose: Nose normal    Mouth/Throat: Oropharynx is clear and moist    Eyes: Pupils are equal, round, and reactive to light  Conjunctivae and EOM are normal  Right eye exhibits no discharge  Left eye exhibits no discharge  Neck: Normal range of motion  Neck supple  No JVD present  No tracheal deviation present  No thyromegaly present  Cardiovascular: Normal rate, normal heart sounds and intact distal pulses  Pulmonary/Chest: Effort normal and breath sounds normal  She has no wheezes  She has no rales  Abdominal: Soft  Bowel sounds are normal  She exhibits no distension and no mass  There is no tenderness  There is no rebound  Musculoskeletal: Normal range of motion  Lymphadenopathy:     She has no cervical adenopathy  Neurological: She is alert and oriented to person, place, and time  She has normal reflexes  No cranial nerve deficit  Coordination normal    Skin: Skin is warm and dry  No rash noted  Psychiatric: She has a normal mood and affect  Her behavior is normal  Judgment and thought content normal    Nursing note and vitals reviewed

## 2019-06-06 ENCOUNTER — OFFICE VISIT (OUTPATIENT)
Dept: FAMILY MEDICINE CLINIC | Facility: CLINIC | Age: 64
End: 2019-06-06
Payer: COMMERCIAL

## 2019-06-06 VITALS
BODY MASS INDEX: 32.27 KG/M2 | WEIGHT: 189 LBS | DIASTOLIC BLOOD PRESSURE: 80 MMHG | SYSTOLIC BLOOD PRESSURE: 124 MMHG | HEIGHT: 64 IN

## 2019-06-06 DIAGNOSIS — F41.1 GENERALIZED ANXIETY DISORDER: ICD-10-CM

## 2019-06-06 DIAGNOSIS — E78.2 MIXED HYPERLIPIDEMIA: Primary | ICD-10-CM

## 2019-06-06 DIAGNOSIS — E66.09 CLASS 1 OBESITY DUE TO EXCESS CALORIES WITH SERIOUS COMORBIDITY AND BODY MASS INDEX (BMI) OF 32.0 TO 32.9 IN ADULT: ICD-10-CM

## 2019-06-06 DIAGNOSIS — F33.0 MILD EPISODE OF RECURRENT MAJOR DEPRESSIVE DISORDER (HCC): ICD-10-CM

## 2019-06-06 DIAGNOSIS — M51.36 DISC DEGENERATION, LUMBAR: ICD-10-CM

## 2019-06-06 DIAGNOSIS — C44.311 BASAL CELL CARCINOMA OF NOSE: ICD-10-CM

## 2019-06-06 PROCEDURE — 99214 OFFICE O/P EST MOD 30 MIN: CPT | Performed by: FAMILY MEDICINE

## 2019-06-06 PROCEDURE — 1036F TOBACCO NON-USER: CPT | Performed by: FAMILY MEDICINE

## 2019-06-06 PROCEDURE — 3008F BODY MASS INDEX DOCD: CPT | Performed by: FAMILY MEDICINE

## 2019-06-06 RX ORDER — TRAZODONE HYDROCHLORIDE 100 MG/1
100 TABLET ORAL
Qty: 30 TABLET | Refills: 3 | Status: SHIPPED | OUTPATIENT
Start: 2019-06-06 | End: 2019-10-02 | Stop reason: SDUPTHER

## 2019-06-12 LAB
ALBUMIN SERPL-MCNC: 4.1 G/DL (ref 3.6–5.1)
ALBUMIN/GLOB SERPL: 1.6 (CALC) (ref 1–2.5)
ALP SERPL-CCNC: 63 U/L (ref 33–130)
ALT SERPL-CCNC: 18 U/L (ref 6–29)
AST SERPL-CCNC: 21 U/L (ref 10–35)
BILIRUB SERPL-MCNC: 0.3 MG/DL (ref 0.2–1.2)
BUN SERPL-MCNC: 18 MG/DL (ref 7–25)
BUN/CREAT SERPL: NORMAL (CALC) (ref 6–22)
CALCIUM SERPL-MCNC: 9 MG/DL (ref 8.6–10.4)
CHLORIDE SERPL-SCNC: 106 MMOL/L (ref 98–110)
CHOLEST SERPL-MCNC: 218 MG/DL
CHOLEST/HDLC SERPL: 4 (CALC)
CO2 SERPL-SCNC: 24 MMOL/L (ref 20–32)
CREAT SERPL-MCNC: 0.92 MG/DL (ref 0.5–0.99)
GLOBULIN SER CALC-MCNC: 2.6 G/DL (CALC) (ref 1.9–3.7)
GLUCOSE SERPL-MCNC: 91 MG/DL (ref 65–99)
HDLC SERPL-MCNC: 54 MG/DL
LDLC SERPL CALC-MCNC: 142 MG/DL (CALC)
NONHDLC SERPL-MCNC: 164 MG/DL (CALC)
POTASSIUM SERPL-SCNC: 4.5 MMOL/L (ref 3.5–5.3)
PROT SERPL-MCNC: 6.7 G/DL (ref 6.1–8.1)
SL AMB EGFR AFRICAN AMERICAN: 77 ML/MIN/1.73M2
SL AMB EGFR NON AFRICAN AMERICAN: 66 ML/MIN/1.73M2
SODIUM SERPL-SCNC: 139 MMOL/L (ref 135–146)
TRIGL SERPL-MCNC: 102 MG/DL
TSH SERPL-ACNC: 1.57 MIU/L (ref 0.4–4.5)

## 2019-10-02 DIAGNOSIS — F33.0 MILD EPISODE OF RECURRENT MAJOR DEPRESSIVE DISORDER (HCC): ICD-10-CM

## 2019-10-02 DIAGNOSIS — F41.1 GENERALIZED ANXIETY DISORDER: ICD-10-CM

## 2019-10-02 RX ORDER — TRAZODONE HYDROCHLORIDE 100 MG/1
TABLET ORAL
Qty: 30 TABLET | Refills: 3 | Status: SHIPPED | OUTPATIENT
Start: 2019-10-02 | End: 2019-11-12

## 2019-11-12 ENCOUNTER — OFFICE VISIT (OUTPATIENT)
Dept: FAMILY MEDICINE CLINIC | Facility: CLINIC | Age: 64
End: 2019-11-12
Payer: COMMERCIAL

## 2019-11-12 VITALS
HEIGHT: 64 IN | SYSTOLIC BLOOD PRESSURE: 118 MMHG | DIASTOLIC BLOOD PRESSURE: 78 MMHG | BODY MASS INDEX: 32.64 KG/M2 | WEIGHT: 191.2 LBS

## 2019-11-12 DIAGNOSIS — E66.09 CLASS 1 OBESITY DUE TO EXCESS CALORIES WITH SERIOUS COMORBIDITY AND BODY MASS INDEX (BMI) OF 32.0 TO 32.9 IN ADULT: ICD-10-CM

## 2019-11-12 DIAGNOSIS — M67.972 ACHILLES TENDON DISORDER, LEFT: ICD-10-CM

## 2019-11-12 DIAGNOSIS — F33.0 MILD EPISODE OF RECURRENT MAJOR DEPRESSIVE DISORDER (HCC): ICD-10-CM

## 2019-11-12 DIAGNOSIS — E78.2 MIXED HYPERLIPIDEMIA: Primary | ICD-10-CM

## 2019-11-12 DIAGNOSIS — C44.311 BASAL CELL CARCINOMA OF NOSE: ICD-10-CM

## 2019-11-12 DIAGNOSIS — Z23 NEED FOR VACCINATION: ICD-10-CM

## 2019-11-12 PROCEDURE — 90471 IMMUNIZATION ADMIN: CPT | Performed by: FAMILY MEDICINE

## 2019-11-12 PROCEDURE — 90682 RIV4 VACC RECOMBINANT DNA IM: CPT | Performed by: FAMILY MEDICINE

## 2019-11-12 PROCEDURE — 1036F TOBACCO NON-USER: CPT | Performed by: FAMILY MEDICINE

## 2019-11-12 PROCEDURE — 99214 OFFICE O/P EST MOD 30 MIN: CPT | Performed by: FAMILY MEDICINE

## 2019-11-12 NOTE — PROGRESS NOTES
Assessment/Plan:    Mixed hyperlipidemia  LDL was 143 and HDL is 54 Patient will have repeat labs and continue to watch diet    Mild episode of recurrent major depressive disorder ([facility] [address] )  Patient will continue with counselling and will stay off the medication for now    Class 1 obesity due to excess calories with serious comorbidity and body mass index (BMI) of 32 0 to 32 9 in adult  Discussed diet and exercise patient will follwoup in 6 months    Basal cell carcinoma of nose  Dermatology followup    Achilles tendon disorder, left  Continue with ortho followup       Diagnoses and all orders for this visit:    Mixed hyperlipidemia  -     Comprehensive metabolic panel; Future  -     Lipid panel; Future    Class 1 obesity due to excess calories with serious comorbidity and body mass index (BMI) of 32 0 to 32 9 in adult  -     Comprehensive metabolic panel; Future    Basal cell carcinoma of nose    Achilles tendon disorder, left    Mild episode of recurrent major depressive disorder ([facility] [address] )    Need for vaccination  -     influenza vaccine, [id number], quadrivalent, recombinant, PF, 0 5 mL, for patients 18 yr+ (FLUBLOK)    Other orders  -     diclofenac sodium (VOLTAREN) 1 %; APPLY 4 GRAMS PER AFFECTED AREA QID MAX 32 TOTAL ALL AREAS IN 24 H          Subjective:   Chief Complaint   Patient presents with    Hyperlipidemia    Anxiety          Patient ID: Pam Parikh is a 62 y o  female      Patient is here followup of lipids, obesity, depression basal cell carcinoma of the nose and also the achilles tendon issue Patient is continueing with followup of the basal cell CA and is seeing dermatology patient weight is stable  Patient last lipid issues are stable patient will watch diet Patient depression is stable She is having issues with her daughter Patient is seeing counselor      The following portions of the patient's history were reviewed and updated as appropriate: allergies, current medications, past medical history, past social history and problem list     Review of Systems   Constitutional: Negative for fatigue, fever and unexpected weight change  HENT: Negative for congestion, sinus pain and trouble swallowing  Eyes: Negative for discharge and visual disturbance  Respiratory: Negative for cough, chest tightness, shortness of breath and wheezing  Cardiovascular: Negative for chest pain, palpitations and leg swelling  Gastrointestinal: Negative for abdominal pain, blood in stool, constipation, diarrhea, nausea and vomiting  Genitourinary: Negative for difficulty urinating, dysuria, frequency and hematuria  Musculoskeletal: Negative for arthralgias, gait problem and joint swelling  Continue with foot and ankle pain   Skin: Negative for rash and wound  Area on the nose where there is a lot of dry skin   Allergic/Immunologic: Negative for environmental allergies and food allergies  Neurological: Negative for dizziness, syncope, weakness, numbness and headaches  Hematological: Negative for adenopathy  Does not bruise/bleed easily  Psychiatric/Behavioral: Negative for confusion, decreased concentration and sleep disturbance  The patient is not nervous/anxious  Objective:      /78   Ht 5' 4" (1 626 m)   Wt 86 7 kg (191 lb 3 2 oz)   BMI 32 82 kg/m²          Physical Exam   Constitutional: She is oriented to person, place, and time  She appears well-developed and well-nourished  HENT:   Head: Normocephalic and atraumatic  Right Ear: Hearing, tympanic membrane and external ear normal    Left Ear: Hearing, tympanic membrane and external ear normal    Eyes: Pupils are equal, round, and reactive to light  Conjunctivae and EOM are normal    Neck: Neck supple  No thyromegaly present  Cardiovascular: Normal rate and normal heart sounds  Pulmonary/Chest: Effort normal and breath sounds normal  She has no wheezes  She has no rales  Abdominal: Soft   Bowel sounds are normal  She exhibits no distension  There is no tenderness  Musculoskeletal: She exhibits no edema or tenderness  Lymphadenopathy:     She has no cervical adenopathy  Neurological: She is alert and oriented to person, place, and time  No cranial nerve deficit  Coordination normal    Skin: Skin is warm and dry  No rash noted  Psychiatric: She has a normal mood and affect  Her behavior is normal  Judgment and thought content normal    Nursing note and vitals reviewed

## 2019-11-27 LAB
ALBUMIN SERPL-MCNC: 4.3 G/DL (ref 3.6–5.1)
ALBUMIN/GLOB SERPL: 1.7 (CALC) (ref 1–2.5)
ALP SERPL-CCNC: 74 U/L (ref 33–130)
ALT SERPL-CCNC: 18 U/L (ref 6–29)
AST SERPL-CCNC: 20 U/L (ref 10–35)
BILIRUB SERPL-MCNC: 0.4 MG/DL (ref 0.2–1.2)
BUN SERPL-MCNC: 14 MG/DL (ref 7–25)
BUN/CREAT SERPL: NORMAL (CALC) (ref 6–22)
CALCIUM SERPL-MCNC: 9.1 MG/DL (ref 8.6–10.4)
CHLORIDE SERPL-SCNC: 109 MMOL/L (ref 98–110)
CHOLEST SERPL-MCNC: 190 MG/DL
CHOLEST/HDLC SERPL: 3.3 (CALC)
CO2 SERPL-SCNC: 23 MMOL/L (ref 20–32)
CREAT SERPL-MCNC: 0.93 MG/DL (ref 0.5–0.99)
GLOBULIN SER CALC-MCNC: 2.5 G/DL (CALC) (ref 1.9–3.7)
GLUCOSE SERPL-MCNC: 96 MG/DL (ref 65–99)
HDLC SERPL-MCNC: 58 MG/DL
LDLC SERPL CALC-MCNC: 112 MG/DL (CALC)
NONHDLC SERPL-MCNC: 132 MG/DL (CALC)
POTASSIUM SERPL-SCNC: 4.1 MMOL/L (ref 3.5–5.3)
PROT SERPL-MCNC: 6.8 G/DL (ref 6.1–8.1)
SL AMB EGFR AFRICAN AMERICAN: 76 ML/MIN/1.73M2
SL AMB EGFR NON AFRICAN AMERICAN: 65 ML/MIN/1.73M2
SODIUM SERPL-SCNC: 141 MMOL/L (ref 135–146)
TRIGL SERPL-MCNC: 98 MG/DL

## 2020-05-21 ENCOUNTER — TELEPHONE (OUTPATIENT)
Dept: FAMILY MEDICINE CLINIC | Facility: CLINIC | Age: 65
End: 2020-05-21

## 2020-05-21 DIAGNOSIS — Z20.828 EXPOSURE TO SARS VIRUS: Primary | ICD-10-CM

## 2020-06-04 LAB — SARS-COV-2 IGG SERPL QL IA: NEGATIVE

## 2020-10-22 ENCOUNTER — OFFICE VISIT (OUTPATIENT)
Dept: FAMILY MEDICINE CLINIC | Facility: CLINIC | Age: 65
End: 2020-10-22
Payer: COMMERCIAL

## 2020-10-22 VITALS
SYSTOLIC BLOOD PRESSURE: 122 MMHG | HEIGHT: 64 IN | TEMPERATURE: 97.3 F | DIASTOLIC BLOOD PRESSURE: 78 MMHG | BODY MASS INDEX: 31.34 KG/M2 | WEIGHT: 183.6 LBS

## 2020-10-22 DIAGNOSIS — Z11.4 SCREENING FOR HIV (HUMAN IMMUNODEFICIENCY VIRUS): ICD-10-CM

## 2020-10-22 DIAGNOSIS — Z13.0 SCREENING FOR DEFICIENCY ANEMIA: ICD-10-CM

## 2020-10-22 DIAGNOSIS — E66.09 CLASS 1 OBESITY DUE TO EXCESS CALORIES WITH SERIOUS COMORBIDITY AND BODY MASS INDEX (BMI) OF 31.0 TO 31.9 IN ADULT: ICD-10-CM

## 2020-10-22 DIAGNOSIS — Z00.00 ANNUAL PHYSICAL EXAM: Primary | ICD-10-CM

## 2020-10-22 DIAGNOSIS — Z13.1 SCREENING FOR DIABETES MELLITUS: ICD-10-CM

## 2020-10-22 DIAGNOSIS — Z23 ENCOUNTER FOR IMMUNIZATION: ICD-10-CM

## 2020-10-22 DIAGNOSIS — Z13.6 SCREENING FOR CARDIOVASCULAR CONDITION: ICD-10-CM

## 2020-10-22 DIAGNOSIS — Z11.3 SCREENING FOR STDS (SEXUALLY TRANSMITTED DISEASES): ICD-10-CM

## 2020-10-22 PROBLEM — F33.0 MILD EPISODE OF RECURRENT MAJOR DEPRESSIVE DISORDER (HCC): Status: RESOLVED | Noted: 2019-06-06 | Resolved: 2020-10-22

## 2020-10-22 PROCEDURE — 99396 PREV VISIT EST AGE 40-64: CPT | Performed by: FAMILY MEDICINE

## 2020-10-22 PROCEDURE — 3725F SCREEN DEPRESSION PERFORMED: CPT | Performed by: FAMILY MEDICINE

## 2020-10-22 PROCEDURE — 90471 IMMUNIZATION ADMIN: CPT | Performed by: FAMILY MEDICINE

## 2020-10-22 PROCEDURE — 1036F TOBACCO NON-USER: CPT | Performed by: FAMILY MEDICINE

## 2020-10-22 PROCEDURE — 90682 RIV4 VACC RECOMBINANT DNA IM: CPT | Performed by: FAMILY MEDICINE

## 2020-10-26 LAB
ALBUMIN SERPL-MCNC: 4.1 G/DL (ref 3.6–5.1)
ALBUMIN/GLOB SERPL: 1.6 (CALC) (ref 1–2.5)
ALP SERPL-CCNC: 62 U/L (ref 37–153)
ALT SERPL-CCNC: 15 U/L (ref 6–29)
AST SERPL-CCNC: 19 U/L (ref 10–35)
BASOPHILS # BLD AUTO: 39 CELLS/UL (ref 0–200)
BASOPHILS NFR BLD AUTO: 0.7 %
BILIRUB SERPL-MCNC: 0.5 MG/DL (ref 0.2–1.2)
BUN SERPL-MCNC: 12 MG/DL (ref 7–25)
BUN/CREAT SERPL: NORMAL (CALC) (ref 6–22)
C TRACH RRNA SPEC QL NAA+PROBE: NOT DETECTED
CALCIUM SERPL-MCNC: 9.2 MG/DL (ref 8.6–10.4)
CHLORIDE SERPL-SCNC: 104 MMOL/L (ref 98–110)
CHOLEST SERPL-MCNC: 192 MG/DL
CHOLEST/HDLC SERPL: 3.6 (CALC)
CO2 SERPL-SCNC: 26 MMOL/L (ref 20–32)
CREAT SERPL-MCNC: 0.83 MG/DL (ref 0.5–0.99)
EOSINOPHIL # BLD AUTO: 118 CELLS/UL (ref 15–500)
EOSINOPHIL NFR BLD AUTO: 2.1 %
ERYTHROCYTE [DISTWIDTH] IN BLOOD BY AUTOMATED COUNT: 12 % (ref 11–15)
GLOBULIN SER CALC-MCNC: 2.5 G/DL (CALC) (ref 1.9–3.7)
GLUCOSE SERPL-MCNC: 90 MG/DL (ref 65–99)
HCT VFR BLD AUTO: 42.9 % (ref 35–45)
HDLC SERPL-MCNC: 53 MG/DL
HGB BLD-MCNC: 14.6 G/DL (ref 11.7–15.5)
HIV 1+2 AB+HIV1 P24 AG SERPL QL IA: NORMAL
LDLC SERPL CALC-MCNC: 119 MG/DL (CALC)
LYMPHOCYTES # BLD AUTO: 1652 CELLS/UL (ref 850–3900)
LYMPHOCYTES NFR BLD AUTO: 29.5 %
MCH RBC QN AUTO: 32.3 PG (ref 27–33)
MCHC RBC AUTO-ENTMCNC: 34 G/DL (ref 32–36)
MCV RBC AUTO: 94.9 FL (ref 80–100)
MONOCYTES # BLD AUTO: 521 CELLS/UL (ref 200–950)
MONOCYTES NFR BLD AUTO: 9.3 %
N GONORRHOEA RRNA SPEC QL NAA+PROBE: NOT DETECTED
NEUTROPHILS # BLD AUTO: 3270 CELLS/UL (ref 1500–7800)
NEUTROPHILS NFR BLD AUTO: 58.4 %
NONHDLC SERPL-MCNC: 139 MG/DL (CALC)
PLATELET # BLD AUTO: 242 THOUSAND/UL (ref 140–400)
PMV BLD REES-ECKER: 11.8 FL (ref 7.5–12.5)
POTASSIUM SERPL-SCNC: 4.4 MMOL/L (ref 3.5–5.3)
PROT SERPL-MCNC: 6.6 G/DL (ref 6.1–8.1)
RBC # BLD AUTO: 4.52 MILLION/UL (ref 3.8–5.1)
RPR SER QL: NORMAL
SL AMB EGFR AFRICAN AMERICAN: 86 ML/MIN/1.73M2
SL AMB EGFR NON AFRICAN AMERICAN: 75 ML/MIN/1.73M2
SODIUM SERPL-SCNC: 137 MMOL/L (ref 135–146)
TRIGL SERPL-MCNC: 94 MG/DL
TSH SERPL-ACNC: 1.36 MIU/L (ref 0.4–4.5)
WBC # BLD AUTO: 5.6 THOUSAND/UL (ref 3.8–10.8)

## 2021-03-31 DIAGNOSIS — Z23 ENCOUNTER FOR IMMUNIZATION: ICD-10-CM

## 2021-04-12 ENCOUNTER — IMMUNIZATIONS (OUTPATIENT)
Dept: FAMILY MEDICINE CLINIC | Facility: HOSPITAL | Age: 66
End: 2021-04-12

## 2021-04-12 DIAGNOSIS — Z23 ENCOUNTER FOR IMMUNIZATION: Primary | ICD-10-CM

## 2021-04-12 PROCEDURE — 0011A SARS-COV-2 / COVID-19 MRNA VACCINE (MODERNA) 100 MCG: CPT

## 2021-04-12 PROCEDURE — 91301 SARS-COV-2 / COVID-19 MRNA VACCINE (MODERNA) 100 MCG: CPT

## 2021-05-12 ENCOUNTER — IMMUNIZATIONS (OUTPATIENT)
Dept: FAMILY MEDICINE CLINIC | Facility: HOSPITAL | Age: 66
End: 2021-05-12

## 2021-05-12 DIAGNOSIS — Z23 ENCOUNTER FOR IMMUNIZATION: Primary | ICD-10-CM

## 2021-05-12 PROCEDURE — 0012A SARS-COV-2 / COVID-19 MRNA VACCINE (MODERNA) 100 MCG: CPT

## 2021-05-12 PROCEDURE — 91301 SARS-COV-2 / COVID-19 MRNA VACCINE (MODERNA) 100 MCG: CPT

## 2021-08-11 ENCOUNTER — OFFICE VISIT (OUTPATIENT)
Dept: FAMILY MEDICINE CLINIC | Facility: CLINIC | Age: 66
End: 2021-08-11
Payer: MEDICARE

## 2021-08-11 VITALS
SYSTOLIC BLOOD PRESSURE: 124 MMHG | BODY MASS INDEX: 32.5 KG/M2 | HEIGHT: 64 IN | DIASTOLIC BLOOD PRESSURE: 82 MMHG | WEIGHT: 190.4 LBS | TEMPERATURE: 97.2 F

## 2021-08-11 DIAGNOSIS — M51.36 DISC DEGENERATION, LUMBAR: Primary | ICD-10-CM

## 2021-08-11 DIAGNOSIS — M54.41 ACUTE RIGHT-SIDED LOW BACK PAIN WITH RIGHT-SIDED SCIATICA: ICD-10-CM

## 2021-08-11 PROCEDURE — 99213 OFFICE O/P EST LOW 20 MIN: CPT | Performed by: FAMILY MEDICINE

## 2021-08-11 RX ORDER — NAPROXEN 500 MG/1
TABLET ORAL
Qty: 60 TABLET | Refills: 0 | Status: SHIPPED | OUTPATIENT
Start: 2021-08-11 | End: 2022-01-14

## 2021-08-11 RX ORDER — NAPROXEN 500 MG/1
TABLET ORAL
Qty: 60 TABLET | Refills: 0 | Status: SHIPPED | OUTPATIENT
Start: 2021-08-11 | End: 2021-08-11 | Stop reason: SDUPTHER

## 2021-08-11 RX ORDER — TIZANIDINE HYDROCHLORIDE 4 MG/1
CAPSULE, GELATIN COATED ORAL
Qty: 10 CAPSULE | Refills: 0 | Status: SHIPPED | OUTPATIENT
Start: 2021-08-11 | End: 2022-03-21

## 2021-08-11 RX ORDER — TIZANIDINE HYDROCHLORIDE 4 MG/1
CAPSULE, GELATIN COATED ORAL
Qty: 10 CAPSULE | Refills: 0 | Status: SHIPPED | OUTPATIENT
Start: 2021-08-11 | End: 2021-08-11 | Stop reason: SDUPTHER

## 2021-08-11 NOTE — PROGRESS NOTES
Assessment/Plan:    Acute right-sided low back pain with right-sided sciatica  Refer to PT Patient to take naprosyn as directed Muscle relaxant at bedtime       Diagnoses and all orders for this visit:    Disc degeneration, lumbar  -     Discontinue: naproxen (NAPROSYN) 500 mg tablet; 1 tablet twice daily with food for 10 days then as needed  -     Discontinue: TiZANidine (ZANAFLEX) 4 MG capsule; 1 tablet at bed time for 3 nights then as needed  -     Ambulatory referral to Physical Therapy; Future  -     TiZANidine (ZANAFLEX) 4 MG capsule; 1 tablet at bed time for 3 nights then as needed  -     naproxen (NAPROSYN) 500 mg tablet; 1 tablet twice daily with food for 10 days then as needed    Acute right-sided low back pain with right-sided sciatica  -     Discontinue: naproxen (NAPROSYN) 500 mg tablet; 1 tablet twice daily with food for 10 days then as needed  -     Discontinue: TiZANidine (ZANAFLEX) 4 MG capsule; 1 tablet at bed time for 3 nights then as needed  -     Ambulatory referral to Physical Therapy; Future  -     TiZANidine (ZANAFLEX) 4 MG capsule; 1 tablet at bed time for 3 nights then as needed  -     naproxen (NAPROSYN) 500 mg tablet; 1 tablet twice daily with food for 10 days then as needed          Subjective:   Chief Complaint   Patient presents with    Back Pain          Patient ID: Henry Buerger is a 73 y o  female  Patient is here for [facility] of low back pain right side with sciatica Patient has history of lumbar disc disease She has been doing a lot of yardwork etc Patient has had pain and it is radiating down the right leg Patient has no leg weakness Patient has not had any therapy in some time Patient has tried tylenol and alleve with slight improvement     Back Pain  This is a new problem  The current episode started in the past 7 days  The problem occurs constantly  The problem has been waxing and waning since onset  The pain is present in the lumbar spine   The quality of the pain is described as aching  The pain radiates to the right thigh  The pain is at a severity of 5/10  The pain is moderate  The pain is the same all the time  The symptoms are aggravated by position  Stiffness is present all day  Pertinent negatives include no abdominal pain, bladder incontinence, bowel incontinence, chest pain, dysuria, fever, headaches, leg pain, numbness, paresis, paresthesias, pelvic pain, perianal numbness, tingling, weakness or weight loss  Risk factors include menopause, obesity, poor posture, lack of exercise and sedentary lifestyle  She has tried analgesics for the symptoms  The treatment provided mild relief  The following portions of the patient's history were reviewed and updated as appropriate: allergies, current medications, past family history, past medical history, past social history, past surgical history and problem list     Review of Systems   Constitutional: Negative for fever and weight loss  Cardiovascular: Negative for chest pain  Gastrointestinal: Negative for abdominal pain and bowel incontinence  Genitourinary: Negative for bladder incontinence, dysuria and pelvic pain  Musculoskeletal: Positive for back pain  Neurological: Negative for tingling, weakness, numbness, headaches and paresthesias  Objective:      /82   Temp (!) 97 2 °F (36 2 °C)   Ht 5' 4" (1 626 m)   Wt 86 4 kg (190 lb 6 4 oz)   BMI 32 68 kg/m²          Physical Exam  Vitals and nursing note reviewed  Constitutional:       Appearance: She is obese  HENT:      Head: Normocephalic  Right Ear: External ear normal       Left Ear: External ear normal    Eyes:      Extraocular Movements: Extraocular movements intact  Conjunctiva/sclera: Conjunctivae normal       Pupils: Pupils are equal, round, and reactive to light  Cardiovascular:      Rate and Rhythm: Normal rate and regular rhythm  Pulses: Normal pulses  Heart sounds: Normal heart sounds     Pulmonary: Effort: Pulmonary effort is normal       Breath sounds: Normal breath sounds  Musculoskeletal:      Comments: Right PSIS tenderness to palpaiton Patient has flexion to 90 and extension is 0 patient side bending and rotaiton are to 30 Patient has negative straight leg raising Patient has normal toe and heel walk Patient has 5/5 strenght = b/l extremities   Neurological:      General: No focal deficit present  Mental Status: She is alert and oriented to person, place, and time  Psychiatric:         Mood and Affect: Mood normal          Behavior: Behavior normal          Thought Content:  Thought content normal          Judgment: Judgment normal

## 2021-08-12 NOTE — PATIENT INSTRUCTIONS
Sciatica   WHAT YOU NEED TO KNOW:   What is sciatica? Sciatica is a condition that causes pain along your sciatic nerve  The sciatic nerve runs from your spine through both sides of your buttocks  It then runs down the back of your thigh, into your lower leg and foot  Any place along your sciatic nerve may be compressed, inflamed, irritated, or stretched and cause symptoms  What causes sciatica? Sciatica may be related to certain activities, poor posture, and physical or psychological stress  Any of the following may cause or increase your risk of sciatica:  · Disc problems:  A slipped disc (soft cushion in between the bones of the spine) is the most common cause of sciatica  The disc may press on the sciatic nerve  One bone in your spine may slip over another, or you may have narrowing of the spinal column  · Muscle injury: This may happen after you twist or lift a heavy object  Swelling from sprained or irritated muscles in the buttocks, thighs, or legs press on the sciatic nerve  · Obesity or pregnancy:  Extra weight increases pressure on your back and legs  · Trauma:  Direct blows on the buttocks, thighs, or legs, car accidents, or falls may injure the sciatic nerve  · Diseases of the spine:  Arthritis, osteoporosis, cancer, or infection of the spine may also affect the sciatic nerve  What are the signs and symptoms of sciatica? The symptoms of sciatic may be short-term or long-term:  · Pain that goes from the lower back into your buttocks and down the back of your thigh    · Numbness or tingling in your buttocks and legs    · Muscle weakness, difficulty moving or controlling your leg or foot    · Leg pain that increases with standing, sitting, or squatting    How is sciatica diagnosed? Your healthcare provider will ask about other health conditions you may have  He may ask you about your job, history of back pain, diseases, or surgeries you have had   He will examine you and move your legs to see what increases pain  You may also need any of the following:  · X-rays: This is a picture of the bones and tissues in your back, hip, thigh, or leg  This test may show other problems, such as fractures (broken bones)  · CT scan: This test is also called a CAT scan  An x-ray machine uses a computer to take pictures of your hips, thighs, and legs  The pictures may show your sciatic nerve, muscles, and blood vessels  You may be given a dye before the pictures are taken to help healthcare providers see the pictures better  Tell the healthcare provider if you have ever had an allergic reaction to contrast dye  · MRI:  This scan uses powerful magnets and a computer to take pictures of your hips, thighs, and legs  An MRI may show damaged nerves, muscles, bones, and blood vessels  You may be given dye to help the pictures show up better  Tell the healthcare provider if you have ever had an allergic reaction to contrast dye  Do not enter the MRI room with anything metal  Metal can cause serious injury  Tell the healthcare provider if you have any metal in or on your body  · An electromyography (EMG)  test measures the electrical activity of your muscles at rest and with movement  · Nerve conduction tests: These tests check how surface nerves and related muscles respond to stimulation  Electrodes with wires or tiny needles are placed on certain areas, such as the buttocks and legs  How is sciatica treated? · NSAIDs:  These medicines decrease swelling and pain  NSAIDs are available without a doctor's order  Ask your healthcare provider which medicine is right for you  Ask how much to take and when to take it  Take as directed  NSAIDs can cause stomach bleeding or kidney problems if not taken correctly  · Acetaminophen: This medicine decreases pain  Acetaminophen is available without a doctor's order  Ask how much to take and how often to take it  Follow directions   Acetaminophen can cause liver damage if not taken correctly  · Muscle relaxers  help decrease pain and muscle spasms  · Epidural steroid medicine: This may include both an anesthetic (numbing medicine) and a steroid, which may decrease swelling and relieve pain  It is given as a shot close to the spine in the area where you have pain  · Chemonucleolysis: This is an injection given into the damaged disc to soften or shrink the disc  · Surgery: This may be done to correct problems such as a damaged disc, or a tumor in your spine  It may be done to decrease the pressure on the sciatic nerve  Healthcare providers may also release the muscle that may be pressing into your sciatic nerve  How can I help manage sciatica? · Ultrasound therapy: This is a machine that uses sound waves to decrease pain  Topical medicines may be added to help decrease pain and inflammation  · Physical therapy:  A physical therapist teaches you exercises to help improve movement and strength, and to decrease pain  An occupational therapist teaches you skills to help with your daily activities  · Assistive devices: You may need to wear back support, such as a back brace  You may need crutches, a cane, or a walker to decrease stress on your lower back and leg muscles  Ask your healthcare provider for more information about assistive devices and how to use them correctly  How can sciatica be prevented? · Avoid pressure on your back and legs:  Do not  lift heavy objects, or stand or sit for long periods of time  · Lift objects safely:  Keep your back straight and bend your knees when you  an object  Do not bend or twist your back when you lift  · Maintain a healthy weight:  Ask your healthcare provider how much you should weigh  Ask him to help you create a weight loss plan if you are overweight  · Exercise:  Ask your healthcare provider about the best stretching, warmup, and exercise plan for you      What are the risks of sciatica? An epidural steroid injection can lead to pain disorders or paralysis if it is placed incorrectly  It may also cause headaches, leg pain, and blockage of blood flow to the spinal cord  Surgery may cause you to bleed or get an infection  If not treated, your muscles and nerves may become damaged permanently  You may have decreased strength  You may not be able to move your leg or control when you urinate or have bowel movements  When should I contact my healthcare provider? · You have pain in your lower back at night or when resting  · You have pain in your lower back with numbness below the knee  · You have weakness in one leg only  · You have questions or concerns about your condition or care  When should I seek immediate care or call 911? · You have trouble holding back your urine or bowel movements  · You have weakness in both legs  · You have numbness in your groin or buttocks  CARE AGREEMENT:   You have the right to help plan your care  Learn about your health condition and how it may be treated  Discuss treatment options with your healthcare providers to decide what care you want to receive  You always have the right to refuse treatment  The above information is an  only  It is not intended as medical advice for individual conditions or treatments  Talk to your doctor, nurse or pharmacist before following any medical regimen to see if it is safe and effective for you  © Copyright [employer] 2021 Information is for End User's use only and may not be sold, redistributed or otherwise used for commercial purposes   All illustrations and images included in CareNotes® are the copyrighted property of [employer]  or [address] [place] [address]

## 2021-10-21 ENCOUNTER — TELEPHONE (OUTPATIENT)
Dept: FAMILY MEDICINE CLINIC | Facility: CLINIC | Age: 66
End: 2021-10-21

## 2021-10-22 ENCOUNTER — RA CDI HCC (OUTPATIENT)
Dept: OTHER | Facility: HOSPITAL | Age: 66
End: 2021-10-22

## 2021-10-26 ENCOUNTER — APPOINTMENT (OUTPATIENT)
Dept: LAB | Facility: MEDICAL CENTER | Age: 66
End: 2021-10-26
Payer: MEDICARE

## 2021-10-26 DIAGNOSIS — E78.2 MIXED HYPERLIPIDEMIA: ICD-10-CM

## 2021-10-26 DIAGNOSIS — E66.09 CLASS 1 OBESITY DUE TO EXCESS CALORIES WITH SERIOUS COMORBIDITY AND BODY MASS INDEX (BMI) OF 31.0 TO 31.9 IN ADULT: ICD-10-CM

## 2021-10-26 LAB
ALBUMIN SERPL BCP-MCNC: 3.7 G/DL (ref 3.5–5)
ALP SERPL-CCNC: 76 U/L (ref 46–116)
ALT SERPL W P-5'-P-CCNC: 25 U/L (ref 12–78)
ANION GAP SERPL CALCULATED.3IONS-SCNC: 5 MMOL/L (ref 4–13)
AST SERPL W P-5'-P-CCNC: 20 U/L (ref 5–45)
BILIRUB SERPL-MCNC: 0.67 MG/DL (ref 0.2–1)
BUN SERPL-MCNC: 20 MG/DL (ref 5–25)
CALCIUM SERPL-MCNC: 8.9 MG/DL (ref 8.3–10.1)
CHLORIDE SERPL-SCNC: 111 MMOL/L (ref 100–108)
CHOLEST SERPL-MCNC: 196 MG/DL (ref 50–200)
CO2 SERPL-SCNC: 24 MMOL/L (ref 21–32)
CREAT SERPL-MCNC: 0.86 MG/DL (ref 0.6–1.3)
GFR SERPL CREATININE-BSD FRML MDRD: 71 ML/MIN/1.73SQ M
GLUCOSE P FAST SERPL-MCNC: 90 MG/DL (ref 65–99)
HDLC SERPL-MCNC: 53 MG/DL
LDLC SERPL CALC-MCNC: 126 MG/DL (ref 0–100)
NONHDLC SERPL-MCNC: 143 MG/DL
POTASSIUM SERPL-SCNC: 4.1 MMOL/L (ref 3.5–5.3)
PROT SERPL-MCNC: 7.3 G/DL (ref 6.4–8.2)
SODIUM SERPL-SCNC: 140 MMOL/L (ref 136–145)
TRIGL SERPL-MCNC: 83 MG/DL

## 2021-10-26 PROCEDURE — 80053 COMPREHEN METABOLIC PANEL: CPT

## 2021-10-26 PROCEDURE — 80061 LIPID PANEL: CPT

## 2021-10-26 PROCEDURE — 36415 COLL VENOUS BLD VENIPUNCTURE: CPT

## 2021-10-27 ENCOUNTER — TELEPHONE (OUTPATIENT)
Dept: ADMINISTRATIVE | Facility: OTHER | Age: 66
End: 2021-10-27

## 2021-10-29 ENCOUNTER — OFFICE VISIT (OUTPATIENT)
Dept: FAMILY MEDICINE CLINIC | Facility: CLINIC | Age: 66
End: 2021-10-29
Payer: MEDICARE

## 2021-10-29 VITALS
DIASTOLIC BLOOD PRESSURE: 70 MMHG | SYSTOLIC BLOOD PRESSURE: 120 MMHG | WEIGHT: 187.5 LBS | BODY MASS INDEX: 32.01 KG/M2 | TEMPERATURE: 96.4 F | HEIGHT: 64 IN

## 2021-10-29 DIAGNOSIS — E66.09 CLASS 1 OBESITY DUE TO EXCESS CALORIES WITH SERIOUS COMORBIDITY AND BODY MASS INDEX (BMI) OF 32.0 TO 32.9 IN ADULT: ICD-10-CM

## 2021-10-29 DIAGNOSIS — G25.81 RESTLESS LEG: ICD-10-CM

## 2021-10-29 DIAGNOSIS — Z23 NEED FOR VACCINATION: ICD-10-CM

## 2021-10-29 DIAGNOSIS — E78.2 MIXED HYPERLIPIDEMIA: Primary | ICD-10-CM

## 2021-10-29 DIAGNOSIS — C44.311 BASAL CELL CARCINOMA OF NOSE: ICD-10-CM

## 2021-10-29 DIAGNOSIS — M51.36 DISC DEGENERATION, LUMBAR: ICD-10-CM

## 2021-10-29 PROBLEM — M54.41 ACUTE RIGHT-SIDED LOW BACK PAIN WITH RIGHT-SIDED SCIATICA: Status: RESOLVED | Noted: 2021-08-11 | Resolved: 2021-10-29

## 2021-10-29 PROCEDURE — 90662 IIV NO PRSV INCREASED AG IM: CPT | Performed by: FAMILY MEDICINE

## 2021-10-29 PROCEDURE — 90732 PPSV23 VACC 2 YRS+ SUBQ/IM: CPT | Performed by: FAMILY MEDICINE

## 2021-10-29 PROCEDURE — G0009 ADMIN PNEUMOCOCCAL VACCINE: HCPCS | Performed by: FAMILY MEDICINE

## 2021-10-29 PROCEDURE — G0008 ADMIN INFLUENZA VIRUS VAC: HCPCS | Performed by: FAMILY MEDICINE

## 2021-10-29 PROCEDURE — 99214 OFFICE O/P EST MOD 30 MIN: CPT | Performed by: FAMILY MEDICINE

## 2021-10-29 RX ORDER — GABAPENTIN 100 MG/1
100 CAPSULE ORAL
Qty: 30 CAPSULE | Refills: 1 | Status: SHIPPED | OUTPATIENT
Start: 2021-10-29 | End: 2021-12-17

## 2021-10-29 RX ORDER — GABAPENTIN 100 MG/1
100 CAPSULE ORAL
Qty: 30 CAPSULE | Refills: 1 | Status: SHIPPED | OUTPATIENT
Start: 2021-10-29 | End: 2021-10-29 | Stop reason: SDUPTHER

## 2021-11-30 ENCOUNTER — OFFICE VISIT (OUTPATIENT)
Dept: FAMILY MEDICINE CLINIC | Facility: CLINIC | Age: 66
End: 2021-11-30
Payer: MEDICARE

## 2021-11-30 VITALS
DIASTOLIC BLOOD PRESSURE: 78 MMHG | BODY MASS INDEX: 32.81 KG/M2 | TEMPERATURE: 97.7 F | WEIGHT: 192.2 LBS | HEIGHT: 64 IN | SYSTOLIC BLOOD PRESSURE: 132 MMHG

## 2021-11-30 DIAGNOSIS — Z00.00 MEDICARE ANNUAL WELLNESS VISIT, INITIAL: Primary | ICD-10-CM

## 2021-11-30 DIAGNOSIS — Z82.49 FAMILY HISTORY OF ABDOMINAL AORTIC ANEURYSM (AAA): ICD-10-CM

## 2021-11-30 DIAGNOSIS — E66.09 CLASS 1 OBESITY DUE TO EXCESS CALORIES WITH SERIOUS COMORBIDITY AND BODY MASS INDEX (BMI) OF 32.0 TO 32.9 IN ADULT: ICD-10-CM

## 2021-11-30 DIAGNOSIS — G25.81 RESTLESS LEG: ICD-10-CM

## 2021-11-30 DIAGNOSIS — Z78.0 ASYMPTOMATIC POSTMENOPAUSAL STATE: ICD-10-CM

## 2021-11-30 DIAGNOSIS — Z13.6 SCREENING FOR CARDIOVASCULAR CONDITION: ICD-10-CM

## 2021-11-30 DIAGNOSIS — E78.2 MIXED HYPERLIPIDEMIA: ICD-10-CM

## 2021-11-30 PROCEDURE — G0402 INITIAL PREVENTIVE EXAM: HCPCS | Performed by: FAMILY MEDICINE

## 2021-11-30 PROCEDURE — 1123F ACP DISCUSS/DSCN MKR DOCD: CPT | Performed by: FAMILY MEDICINE

## 2021-11-30 PROCEDURE — G0403 EKG FOR INITIAL PREVENT EXAM: HCPCS | Performed by: FAMILY MEDICINE

## 2021-11-30 PROCEDURE — 99214 OFFICE O/P EST MOD 30 MIN: CPT | Performed by: FAMILY MEDICINE

## 2021-12-08 ENCOUNTER — HOSPITAL ENCOUNTER (OUTPATIENT)
Dept: ULTRASOUND IMAGING | Facility: MEDICAL CENTER | Age: 66
Discharge: HOME | End: 2021-12-08
Payer: MEDICARE

## 2021-12-08 DIAGNOSIS — Z82.49 FAMILY HISTORY OF ABDOMINAL AORTIC ANEURYSM (AAA): ICD-10-CM

## 2021-12-08 DIAGNOSIS — Z13.6 SCREENING FOR CARDIOVASCULAR CONDITION: ICD-10-CM

## 2021-12-08 PROCEDURE — 76706 US ABDL AORTA SCREEN AAA: CPT

## 2021-12-17 DIAGNOSIS — G25.81 RESTLESS LEG: ICD-10-CM

## 2021-12-17 RX ORDER — GABAPENTIN 100 MG/1
CAPSULE ORAL
Qty: 30 CAPSULE | Refills: 1 | Status: SHIPPED | OUTPATIENT
Start: 2021-12-17 | End: 2022-01-14 | Stop reason: SDUPTHER

## 2022-01-14 DIAGNOSIS — M54.41 ACUTE RIGHT-SIDED LOW BACK PAIN WITH RIGHT-SIDED SCIATICA: ICD-10-CM

## 2022-01-14 DIAGNOSIS — M51.36 DISC DEGENERATION, LUMBAR: ICD-10-CM

## 2022-01-14 DIAGNOSIS — G25.81 RESTLESS LEG: ICD-10-CM

## 2022-01-14 RX ORDER — NAPROXEN 500 MG/1
TABLET ORAL
Qty: 60 TABLET | Refills: 0 | Status: SHIPPED | OUTPATIENT
Start: 2022-01-14 | End: 2022-04-07

## 2022-01-14 RX ORDER — NAPROXEN 500 MG/1
TABLET ORAL
Qty: 60 TABLET | Refills: 0 | Status: SHIPPED | OUTPATIENT
Start: 2022-01-14 | End: 2022-01-14 | Stop reason: SDUPTHER

## 2022-01-14 RX ORDER — GABAPENTIN 100 MG/1
100 CAPSULE ORAL
Qty: 30 CAPSULE | Refills: 0 | Status: SHIPPED | OUTPATIENT
Start: 2022-01-14 | End: 2022-02-25

## 2022-02-11 ENCOUNTER — HOSPITAL ENCOUNTER (OUTPATIENT)
Dept: BONE DENSITY | Facility: MEDICAL CENTER | Age: 67
Discharge: HOME | End: 2022-02-11
Payer: MEDICARE

## 2022-02-11 DIAGNOSIS — Z78.0 ASYMPTOMATIC POSTMENOPAUSAL STATE: ICD-10-CM

## 2022-02-11 PROCEDURE — 77080 DXA BONE DENSITY AXIAL: CPT

## 2022-02-25 DIAGNOSIS — G25.81 RESTLESS LEG: ICD-10-CM

## 2022-02-25 RX ORDER — GABAPENTIN 100 MG/1
CAPSULE ORAL
Qty: 30 CAPSULE | Refills: 0 | Status: SHIPPED | OUTPATIENT
Start: 2022-02-25 | End: 2022-04-07 | Stop reason: SDUPTHER

## 2022-03-21 ENCOUNTER — OFFICE VISIT (OUTPATIENT)
Dept: FAMILY MEDICINE CLINIC | Facility: CLINIC | Age: 67
End: 2022-03-21
Payer: MEDICARE

## 2022-03-21 VITALS
HEIGHT: 64 IN | WEIGHT: 192.38 LBS | SYSTOLIC BLOOD PRESSURE: 130 MMHG | BODY MASS INDEX: 32.84 KG/M2 | TEMPERATURE: 97.5 F | DIASTOLIC BLOOD PRESSURE: 74 MMHG

## 2022-03-21 DIAGNOSIS — J01.00 ACUTE NON-RECURRENT MAXILLARY SINUSITIS: Primary | ICD-10-CM

## 2022-03-21 PROBLEM — F41.1 GENERALIZED ANXIETY DISORDER: Status: RESOLVED | Noted: 2019-06-06 | Resolved: 2022-03-21

## 2022-03-21 PROCEDURE — 99213 OFFICE O/P EST LOW 20 MIN: CPT | Performed by: FAMILY MEDICINE

## 2022-03-21 RX ORDER — AMOXICILLIN 875 MG/1
875 TABLET, COATED ORAL 2 TIMES DAILY
Qty: 20 TABLET | Refills: 0 | Status: SHIPPED | OUTPATIENT
Start: 2022-03-21 | End: 2022-03-31

## 2022-03-21 RX ORDER — DEXTROMETHORPHAN HYDROBROMIDE AND PROMETHAZINE HYDROCHLORIDE 15; 6.25 MG/5ML; MG/5ML
5 SOLUTION ORAL 4 TIMES DAILY PRN
Qty: 120 ML | Refills: 0 | Status: SHIPPED | OUTPATIENT
Start: 2022-03-21 | End: 2022-04-07

## 2022-03-21 NOTE — PATIENT INSTRUCTIONS
Sinusitis   WHAT YOU NEED TO KNOW:   What is sinusitis? Sinusitis is inflammation or infection of your sinuses  Sinusitis is most often caused by a virus  Acute sinusitis may last up to 12 weeks  Chronic sinusitis lasts longer than 12 weeks  Recurrent sinusitis means you have 4 or more infections in 1 year  What increases my risk for sinusitis? · Medical conditions, such as an upper respiratory infection, allergies, asthma, or cystic fibrosis    · Dental infections or procedures, such as gum infections, tooth decay, or a root canal    · Smoking or exposure to secondhand smoke    · Abnormal sinus structure, such as nasal growths, swollen tonsils, or a deviated septum    · A weak immune system, from diseases such as diabetes or HIV    What are the signs and symptoms of sinusitis? · Fever    · Pain, pressure, redness, or swelling around the forehead, cheeks, or eyes    · Thick yellow or green discharge from your nose    · Tenderness when you touch your face over your sinuses    · Dry cough that happens mostly at night or when you lie down    · Headache and face pain that is worse when you lean forward    · Tooth pain, or pain when you chew    How is sinusitis diagnosed? Your healthcare provider will examine you and ask about your symptoms  He or she may check inside your nose using a nasal speculum  You may need any of the following tests:  · A sample of mucus from your nose  may show what germ is causing your infection  · A CT or MRI of your head  may show the mucous lining of your sinuses  You may be given contrast liquid to help your sinuses show up better in the pictures  Tell your healthcare provider if you have ever had an allergic reaction to contrast liquid  Do not enter the MRI room with anything metal  Metal can cause serious injury  Tell your healthcare provider if you have any metal in or on your body  How is sinusitis treated? Your symptoms may go away on their own   Your healthcare provider may recommend watchful waiting for up to 10 days before starting antibiotics  You may need any of the following:  · Acetaminophen  decreases pain and fever  It is available without a doctor's order  Ask how much to take and how often to take it  Follow directions  Read the labels of all other medicines you are using to see if they also contain acetaminophen, or ask your doctor or pharmacist  Acetaminophen can cause liver damage if not taken correctly  Do not use more than 4 grams (4,000 milligrams) total of acetaminophen in one day  · NSAIDs , such as ibuprofen, help decrease swelling, pain, and fever  This medicine is available with or without a doctor's order  NSAIDs can cause stomach bleeding or kidney problems in certain people  If you take blood thinner medicine, always ask your healthcare provider if NSAIDs are safe for you  Always read the medicine label and follow directions  · Nasal steroid sprays  may help decrease inflammation in your nose and sinuses  · Decongestants  help reduce swelling and drain mucus in the nose and sinuses  They may help you breathe easier  · Antihistamines  help dry mucus in the nose and relieve sneezing  · Antibiotics  help treat or prevent a bacterial infection  How can I manage my symptoms? · Rinse your sinuses as directed  Use a sinus rinse device to rinse your nasal passages with a saline (salt water) solution or distilled water  Do not use tap water  This will help thin the mucus in your nose and rinse away pollen and dirt  It will also help reduce swelling so you can breathe normally  · Use a humidifier  to increase air moisture in your home  This may make it easier for you to breathe and help decrease your cough  · Sleep with your head elevated  Place an extra pillow under your head before you go to sleep to help your sinuses drain  · Drink liquids as directed    Ask your healthcare provider how much liquid to drink each day and which liquids are best for you  Liquids will thin the mucus in your nose and help it drain  Avoid drinks that contain alcohol or caffeine  · Do not smoke, and avoid secondhand smoke  Nicotine and other chemicals in cigarettes and cigars can make your symptoms worse  Ask your healthcare provider for information if you currently smoke and need help to quit  E-cigarettes or smokeless tobacco still contain nicotine  Talk to your healthcare provider before you use these products  How can I help prevent the spread of germs? · Wash your hands often with soap and water  Wash your hands after you use the bathroom, change a child's diaper, or sneeze  Wash your hands before you prepare or eat food  · Stay away from people who are sick  Some germs spread easily and quickly through contact  When should I seek immediate care? · You have trouble breathing or wheezing that is getting worse  · You have a stiff neck, a fever, or a bad headache  · You cannot open your eye  · Your eyeball bulges out or you cannot move your eye  · You are more sleepy than normal, or you notice changes in your ability to think, move, or talk  · You have swelling of your forehead or scalp  When should I call my doctor? · You have vision changes, such as double vision  · Your eye and eyelid are red, swollen, and painful  · Your symptoms do not improve or go away after 10 days  · You have nausea and are vomiting  · Your nose is bleeding  · You have questions or concerns about your condition or care  CARE AGREEMENT:   You have the right to help plan your care  Learn about your health condition and how it may be treated  Discuss treatment options with your healthcare providers to decide what care you want to receive  You always have the right to refuse treatment  The above information is an  only  It is not intended as medical advice for individual conditions or treatments   Talk to your doctor, nurse or pharmacist before following any medical regimen to see if it is safe and effective for you  © Copyright [employer] 2022 Information is for End User's use only and may not be sold, redistributed or otherwise used for commercial purposes   All illustrations and images included in CareNotes® are the copyrighted property of [employer]  or [address] [place] [address]

## 2022-03-21 NOTE — PROGRESS NOTES
Assessment/Plan:    Acute non-recurrent maxillary sinusitis  Antibiotic and decongestant as directed       Diagnoses and all orders for this visit:    Acute non-recurrent maxillary sinusitis  -     Promethazine-DM (PHENERGAN-DM) 6 25-15 mg/5 mL oral syrup; Take 5 mL by mouth 4 (four) times a day as needed for cough  -     amoxicillin (AMOXIL) 875 mg tablet; Take 1 tablet (875 mg total) by mouth 2 (two) times a day for 10 days          Subjective:   Chief Complaint   Patient presents with    Sinus Problem     "pressure,cough-since a week ago-home Covid test negative"        Patient ID: Jyothi Escalante is a 73 y o  female  Patient started  With congestion post nasal drip and sinus pain last week on Monday She took Covid test on Thursday and it was negative She has been taking OTC decongestant since then and it did help However over the weekend congestion got worse and now has cough Patient has no fever or chills Patient has had sinus issues before and this is similar     URI   This is a new problem  The current episode started in the past 7 days  The problem has been gradually worsening  There has been no fever  Associated symptoms include congestion, coughing, headaches, rhinorrhea, sinus pain and sneezing  Pertinent negatives include no abdominal pain, chest pain, diarrhea, dysuria, ear pain, joint pain, joint swelling, nausea, neck pain, plugged ear sensation, rash, sore throat, swollen glands, vomiting or wheezing  She has tried decongestant and acetaminophen for the symptoms  The treatment provided mild relief  The following portions of the patient's history were reviewed and updated as appropriate: allergies, current medications, past family history, past medical history, past social history, past surgical history and problem list     Review of Systems   Constitutional: Negative for chills, fatigue and fever  HENT: Positive for congestion, rhinorrhea, sinus pain and sneezing   Negative for ear pain and sore throat  Respiratory: Positive for cough  Negative for shortness of breath and wheezing  Cardiovascular: Negative for chest pain  Gastrointestinal: Negative for abdominal pain, diarrhea, nausea and vomiting  Genitourinary: Negative for dysuria  Musculoskeletal: Negative for arthralgias, joint pain, myalgias and neck pain  Skin: Negative for rash  Neurological: Positive for headaches  Objective:      /74   Temp 97 5 °F (36 4 °C)   Ht 5' 4" (1 626 m)   Wt 87 3 kg (192 lb 6 oz)   BMI 33 02 kg/m²          Physical Exam  Vitals and nursing note reviewed  Constitutional:       Appearance: She is obese  HENT:      Head: Normocephalic  Comments: Maxillary sinus pain to palpation     Right Ear: Tympanic membrane and external ear normal       Left Ear: Tympanic membrane and external ear normal       Nose: Congestion and rhinorrhea present  Eyes:      Extraocular Movements: Extraocular movements intact  Conjunctiva/sclera: Conjunctivae normal       Pupils: Pupils are equal, round, and reactive to light  Cardiovascular:      Rate and Rhythm: Normal rate and regular rhythm  Pulmonary:      Effort: Pulmonary effort is normal       Breath sounds: Normal breath sounds  Musculoskeletal:      Cervical back: Neck supple  Lymphadenopathy:      Cervical: No cervical adenopathy  Neurological:      General: No focal deficit present  Mental Status: She is alert and oriented to person, place, and time  Psychiatric:         Mood and Affect: Mood normal          Behavior: Behavior normal          Thought Content:  Thought content normal          Judgment: Judgment normal

## 2022-04-05 ENCOUNTER — APPOINTMENT (OUTPATIENT)
Dept: LAB | Facility: MEDICAL CENTER | Age: 67
End: 2022-04-05
Payer: MEDICARE

## 2022-04-05 DIAGNOSIS — E78.2 MIXED HYPERLIPIDEMIA: ICD-10-CM

## 2022-04-05 DIAGNOSIS — E66.09 CLASS 1 OBESITY DUE TO EXCESS CALORIES WITH SERIOUS COMORBIDITY AND BODY MASS INDEX (BMI) OF 32.0 TO 32.9 IN ADULT: ICD-10-CM

## 2022-04-05 LAB
ALBUMIN SERPL BCP-MCNC: 4.1 G/DL (ref 3.5–5)
ALP SERPL-CCNC: 81 U/L (ref 46–116)
ALT SERPL W P-5'-P-CCNC: 32 U/L (ref 12–78)
ANION GAP SERPL CALCULATED.3IONS-SCNC: 7 MMOL/L (ref 4–13)
AST SERPL W P-5'-P-CCNC: 24 U/L (ref 5–45)
BILIRUB SERPL-MCNC: 0.44 MG/DL (ref 0.2–1)
BUN SERPL-MCNC: 25 MG/DL (ref 5–25)
CALCIUM SERPL-MCNC: 9.5 MG/DL (ref 8.3–10.1)
CHLORIDE SERPL-SCNC: 107 MMOL/L (ref 100–108)
CHOLEST SERPL-MCNC: 223 MG/DL
CO2 SERPL-SCNC: 25 MMOL/L (ref 21–32)
CREAT SERPL-MCNC: 0.98 MG/DL (ref 0.6–1.3)
GFR SERPL CREATININE-BSD FRML MDRD: 60 ML/MIN/1.73SQ M
GLUCOSE P FAST SERPL-MCNC: 98 MG/DL (ref 65–99)
HDLC SERPL-MCNC: 53 MG/DL
LDLC SERPL CALC-MCNC: 144 MG/DL (ref 0–100)
NONHDLC SERPL-MCNC: 170 MG/DL
POTASSIUM SERPL-SCNC: 4.1 MMOL/L (ref 3.5–5.3)
PROT SERPL-MCNC: 7.5 G/DL (ref 6.4–8.2)
SODIUM SERPL-SCNC: 139 MMOL/L (ref 136–145)
TRIGL SERPL-MCNC: 129 MG/DL

## 2022-04-05 PROCEDURE — 36415 COLL VENOUS BLD VENIPUNCTURE: CPT

## 2022-04-05 PROCEDURE — 80053 COMPREHEN METABOLIC PANEL: CPT

## 2022-04-05 PROCEDURE — 80061 LIPID PANEL: CPT

## 2022-04-07 ENCOUNTER — OFFICE VISIT (OUTPATIENT)
Dept: FAMILY MEDICINE CLINIC | Facility: CLINIC | Age: 67
End: 2022-04-07
Payer: MEDICARE

## 2022-04-07 ENCOUNTER — TELEPHONE (OUTPATIENT)
Dept: ADMINISTRATIVE | Facility: OTHER | Age: 67
End: 2022-04-07

## 2022-04-07 VITALS
HEIGHT: 64 IN | DIASTOLIC BLOOD PRESSURE: 78 MMHG | WEIGHT: 188 LBS | BODY MASS INDEX: 32.1 KG/M2 | SYSTOLIC BLOOD PRESSURE: 118 MMHG

## 2022-04-07 DIAGNOSIS — M54.41 ACUTE RIGHT-SIDED LOW BACK PAIN WITH RIGHT-SIDED SCIATICA: ICD-10-CM

## 2022-04-07 DIAGNOSIS — E78.2 MIXED HYPERLIPIDEMIA: Primary | ICD-10-CM

## 2022-04-07 DIAGNOSIS — M51.36 DISC DEGENERATION, LUMBAR: ICD-10-CM

## 2022-04-07 DIAGNOSIS — G25.81 RESTLESS LEG: ICD-10-CM

## 2022-04-07 DIAGNOSIS — Z79.899 HIGH RISK MEDICATION USE: ICD-10-CM

## 2022-04-07 DIAGNOSIS — C44.311 BASAL CELL CARCINOMA OF NOSE: ICD-10-CM

## 2022-04-07 DIAGNOSIS — E66.09 CLASS 1 OBESITY DUE TO EXCESS CALORIES WITH SERIOUS COMORBIDITY AND BODY MASS INDEX (BMI) OF 32.0 TO 32.9 IN ADULT: ICD-10-CM

## 2022-04-07 PROBLEM — J01.00 ACUTE NON-RECURRENT MAXILLARY SINUSITIS: Status: RESOLVED | Noted: 2022-03-21 | Resolved: 2022-04-07

## 2022-04-07 PROCEDURE — 99214 OFFICE O/P EST MOD 30 MIN: CPT | Performed by: FAMILY MEDICINE

## 2022-04-07 RX ORDER — GABAPENTIN 100 MG/1
100 CAPSULE ORAL
Qty: 30 CAPSULE | Refills: 6 | Status: SHIPPED | OUTPATIENT
Start: 2022-04-07

## 2022-04-07 RX ORDER — SIMVASTATIN 10 MG
10 TABLET ORAL
Qty: 30 TABLET | Refills: 5 | Status: SHIPPED | OUTPATIENT
Start: 2022-04-07

## 2022-04-07 RX ORDER — NAPROXEN 500 MG/1
TABLET ORAL
Qty: 60 TABLET | Refills: 0 | Status: SHIPPED | OUTPATIENT
Start: 2022-04-07 | End: 2022-05-05

## 2022-04-07 NOTE — ASSESSMENT & PLAN NOTE
Will start zocor 10 mg reviewed ASCVD scroe her labs from 2016 to present and also her family history WE discussed possible side effects to the zocor also Repeat labs and followup in 6 months Patient to call if any issues

## 2022-04-07 NOTE — TELEPHONE ENCOUNTER
----- Message from [employer] 82 sent at 4/7/2022  9:44 AM EDT -----  Regarding: mammogram  04/07/22 9:46 AM    Hello, our patient Lori Mooney has had Mammogram completed/performed  Please assist in updating the patient chart by pulling the Care Everywhere (CE) document  The date of service is 12/7/2021       Thank you,  Cindy Jensen  [facility]

## 2022-04-07 NOTE — PROGRESS NOTES
Assessment/Plan:    Restless leg  Continue with gabapentin and followup in 6 months     Basal cell carcinoma of nose  Reviewed need for sunscreen and will also continue to see dermatology    Mixed hyperlipidemia  Will start zocor 10 mg reviewed ASCVD scroe her labs from 2016 to present and also her family history WE discussed possible side effects to the zocor also Repeat labs and followup in 6 months Patient to call if any issues     Class 1 obesity due to excess calories with serious comorbidity and body mass index (BMI) of 32 0 to 32 9 in adult  Again discussed diet and metabolism with patient continue current care and followup in 6 months        Diagnoses and all orders for this visit:    Mixed hyperlipidemia  -     simvastatin (ZOCOR) 10 mg tablet; Take 1 tablet (10 mg total) by mouth daily at bedtime  -     Comprehensive metabolic panel; Future  -     Lipid panel; Future    Class 1 obesity due to excess calories with serious comorbidity and body mass index (BMI) of 32 0 to 32 9 in adult    Restless leg  -     gabapentin (NEURONTIN) 100 mg capsule; Take 1 capsule (100 mg total) by mouth daily at bedtime    Basal cell carcinoma of nose    High risk medication use  -     Comprehensive metabolic panel; Future          Subjective:   Chief Complaint   Patient presents with    Hyperlipidemia     med refill   review blood work           Patient ID: Rhys Mullins is a 73 y o  female      Patient is here for followup of hyperlipidemia history of basal cell carcinoma her weight and her restless legs Patient cholesterol numbers and weight reviewed all the way back to 2016 and really are unchnaged We discussed her family hisotyr and also ehr ASCVD risk score Patient was on simvastatin in the past and agrees to resume it Patient is watching diet and is active Her sleep is good with the gabapentin If she forgets to take it her legs lopez bother her and keep her awake Patient ahs no complaints today She is up to date with screenings She needs shingles shot She sees dermatology every 6 motnhs       The following portions of the patient's history were reviewed and updated as appropriate: allergies, current medications, past family history, past medical history, past social history, past surgical history and problem list     Review of Systems   Constitutional: Negative for fatigue, fever and unexpected weight change  HENT: Negative for congestion, sinus pain and trouble swallowing  Eyes: Negative for discharge and visual disturbance  Respiratory: Negative for cough, chest tightness, shortness of breath and wheezing  Cardiovascular: Negative for chest pain, palpitations and leg swelling  Gastrointestinal: Negative for abdominal pain, blood in stool, constipation, diarrhea, nausea and vomiting  Genitourinary: Negative for difficulty urinating, dysuria, frequency and hematuria  Musculoskeletal: Negative for arthralgias, gait problem and joint swelling  Skin: Negative for rash and wound  Allergic/Immunologic: Negative for environmental allergies and food allergies  Neurological: Negative for dizziness, syncope, weakness, numbness and headaches  Hematological: Negative for adenopathy  Does not bruise/bleed easily  Psychiatric/Behavioral: Negative for confusion, decreased concentration and sleep disturbance  The patient is not nervous/anxious  Objective:      /78   Ht 5' 4" (1 626 m)   Wt 85 3 kg (188 lb)   BMI 32 27 kg/m²          Physical Exam  Vitals and nursing note reviewed  Constitutional:       Appearance: She is well-developed  She is obese  HENT:      Head: Normocephalic and atraumatic  Right Ear: Hearing, tympanic membrane and external ear normal       Left Ear: Hearing, tympanic membrane and external ear normal    Eyes:      Extraocular Movements: Extraocular movements intact        Conjunctiva/sclera: Conjunctivae normal       Pupils: Pupils are equal, round, and reactive to light    Neck:      Thyroid: No thyromegaly  Cardiovascular:      Rate and Rhythm: Normal rate and regular rhythm  Heart sounds: Normal heart sounds  Pulmonary:      Effort: Pulmonary effort is normal       Breath sounds: Normal breath sounds  No wheezing or rales  Abdominal:      General: Bowel sounds are normal  There is no distension  Palpations: Abdomen is soft  Tenderness: There is no abdominal tenderness  Musculoskeletal:         General: No tenderness  Cervical back: Neck supple  Lymphadenopathy:      Cervical: No cervical adenopathy  Skin:     General: Skin is warm and dry  Findings: No rash  Neurological:      General: No focal deficit present  Mental Status: She is alert and oriented to person, place, and time  Cranial Nerves: No cranial nerve deficit  Coordination: Coordination normal    Psychiatric:         Mood and Affect: Mood normal          Behavior: Behavior normal          Thought Content:  Thought content normal          Judgment: Judgment normal

## 2022-04-07 NOTE — PATIENT INSTRUCTIONS
Cholesterol and Your Health   WHAT YOU NEED TO KNOW:   What is cholesterol? Cholesterol is a waxy, fat-like substance  Your body uses cholesterol to make hormones and new cells, and to protect nerves  Cholesterol is made by your body  It also comes from certain foods you eat, such as meat and dairy products  Your healthcare provider can help you set goals for your cholesterol levels  He or she can help you create a plan to meet your goals  What are cholesterol level goals? Your cholesterol level goals depend on your risk for heart disease, your age, and your other health conditions  The following are general guidelines:  · Total cholesterol  includes low-density lipoprotein (LDL), high-density lipoprotein (HDL), and triglyceride levels  The total cholesterol level should be lower than 200 mg/dL and is best at about 150 mg/dL  · LDL cholesterol  is called bad cholesterol  because it forms plaque in your arteries  As plaque builds up, your arteries become narrow, and less blood flows through  When plaque decreases blood flow to your heart, you may have chest pain  If plaque completely blocks an artery that brings blood to your heart, you may have a heart attack  Plaque can break off and form blood clots  Blood clots may block arteries in your brain and cause a stroke  The level should be less than 130 mg/dL and is best at about 100 mg/dL  · HDL cholesterol  is called good cholesterol  because it helps remove LDL cholesterol from your arteries  It does this by attaching to LDL cholesterol and carrying it to your liver  Your liver breaks down LDL cholesterol so your body can get rid of it  High levels of HDL cholesterol can help prevent a heart attack and stroke  Low levels of HDL cholesterol can increase your risk for heart disease, heart attack, and stroke  The level should be 60 mg/dL or higher  · Triglycerides  are a type of fat that store energy from foods you eat   High levels of triglycerides also cause plaque buildup  This can increase your risk for a heart attack or stroke  If your triglyceride level is high, your LDL cholesterol level may also be high  The level should be less than 150 mg/dL  What increases my risk for high cholesterol? · Smoking cigarettes    · Being overweight or obese, or not getting enough exercise    · Drinking large amounts of alcohol    · A medical condition such as hypertension (high blood pressure) or diabetes    · Certain genes passed from your parents to you    · Age older than 72 years    What do I need to know about having my cholesterol levels checked? Adults 21to 39years of age should have their cholesterol levels checked every 4 to 6 years  Adults 45 years or older should have their cholesterol checked every 1 to 2 years  You may need your cholesterol checked more often, or at a younger age, if you have risk factors for heart disease  You may also need to have your cholesterol checked more often if you have other health conditions, such as diabetes  Blood tests are used to check cholesterol levels  Blood tests measure your levels of triglycerides, LDL cholesterol, and HDL cholesterol  How do healthy fats affect my cholesterol levels? Healthy fats, also called unsaturated fats, help lower LDL cholesterol and triglyceride levels  Healthy fats include the following:  · Monounsaturated fats  are found in foods such as olive oil, canola oil, avocado, nuts, and olives  · Polyunsaturated fats,  such as omega 3 fats, are found in fish, such as salmon, trout, and tuna  They can also be found in plant foods such as flaxseed, walnuts, and soybeans  How do unhealthy fats affect my cholesterol levels? Unhealthy fats increase LDL cholesterol and triglyceride levels  They are found in foods high in cholesterol, saturated fat, and trans fat:  · Cholesterol  is found in eggs, dairy, and meat      · Saturated fat  is found in butter, cheese, ice cream, whole milk, and coconut oil  Saturated fat is also found in meat, such as sausage, hot dogs, and bologna  · Trans fat  is found in liquid oils and is used in fried and baked foods  Foods that contain trans fats include chips, crackers, muffins, sweet rolls, microwave popcorn, and cookies  How is high cholesterol treated? Treatment for high cholesterol will also decrease your risk of heart disease, heart attack, and stroke  Treatment may include any of the following:  · Lifestyle changes  may include food, exercise, weight loss, and quitting smoking  You may also need to decrease the amount of alcohol you drink  Your healthcare provider will want you to start with lifestyle changes  Other treatment may be added if lifestyle changes are not enough  Your healthcare provider may recommend you work with a team to manage hyperlipidemia  The team may include medical experts such as a dietitian, an exercise or physical therapist, and a behavior therapist  Your family members may be included in helping you create lifestyle changes  · Medicines  may be given to lower your LDL cholesterol, triglyceride levels, or total cholesterol level  You may need medicines to lower your cholesterol if any of the following is true:    ? You have a history of stroke, TIA, unstable angina, or a heart attack  ? Your LDL cholesterol level is 190 mg/dL or higher  ? You are age 36 to 76 years, have diabetes or heart disease risk factors, and your LDL cholesterol is 70 mg/dL or higher  · Supplements  include fish oil, red yeast rice, and garlic  Fish oil may help lower your triglyceride and LDL cholesterol levels  It may also increase your HDL cholesterol level  Red yeast rice may help decrease your total cholesterol level and LDL cholesterol level  Garlic may help lower your total cholesterol level  Do not take any supplements without talking to your healthcare provider  What food changes can I make to lower my cholesterol levels?   A dietitian can help you create a healthy eating plan  He or she can show you how to read food labels and choose foods low in saturated fat, trans fats, and cholesterol  · Decrease the total amount of fat you eat  Choose lean meats, fat-free or 1% fat milk, and low-fat dairy products, such as yogurt and cheese  Try to limit or avoid red meats  Limit or do not eat fried foods or baked goods, such as cookies  · Replace unhealthy fats with healthy fats  Cook foods in olive oil or canola oil  Choose soft margarines that are low in saturated fat and trans fat  Seeds, nuts, and avocados are other examples of healthy fats  · Eat foods with omega-3 fats  Examples include salmon, tuna, mackerel, walnuts, and flaxseed  Eat fish 2 times per week  Pregnant women should not eat fish that have high levels of mercury, such as shark, swordfish, and sea mackerel  · Increase the amount of high-fiber foods you eat  High-fiber foods can help lower your LDL cholesterol  Aim to get between 20 and 30 grams of fiber each day  Fruits and vegetables are high in fiber  Eat at least 5 servings each day  Other high-fiber foods are whole-grain or whole-wheat breads, pastas, or cereals, and brown rice  Eat 3 ounces of whole-grain foods each day  Increase fiber slowly  You may have abdominal discomfort, bloating, and gas if you add fiber to your diet too quickly  · Eat healthy protein foods  Examples include low-fat dairy products, skinless chicken and turkey, fish, and nuts  · Limit foods and drinks that are high in sugar  Your dietitian or healthcare provider can help you create daily limits for high-sugar foods and drinks  The limit may be lower if you have diabetes or another health condition  Limits can also help you lose weight if needed  What lifestyle changes can I make to lower my cholesterol levels? · Maintain a healthy weight  Ask your healthcare provider what a healthy weight is for you   Ask him or her to help you create a weight loss plan if needed  Weight loss can decrease your total cholesterol and triglyceride levels  Weight loss may also help keep your blood pressure at a healthy level  · Be physically active throughout the day  Physical activity, such as exercise, can help lower your total cholesterol level and maintain a healthy weight  Physical activity can also help increase your HDL cholesterol level  Work with your healthcare provider to create an program that is right for you  Get at least 30 to 40 minutes of moderate physical activity most days of the week  Examples of exercise include brisk walking, swimming, or biking  Also include strength training at least 2 times each week  Your healthcare providers can help you create a physical activity plan  · Do not smoke  Nicotine and other chemicals in cigarettes and cigars can raise your cholesterol levels  Ask your healthcare provider for information if you currently smoke and need help to quit  E-cigarettes or smokeless tobacco still contain nicotine  Talk to your healthcare provider before you use these products  · Limit or do not drink alcohol  Alcohol can increase your triglyceride levels  Ask your healthcare provider before you drink alcohol  Ask how much is okay for you to drink in 24 hours or 1 week  CARE AGREEMENT:   You have the right to help plan your care  Discuss treatment options with your healthcare provider to decide what care you want to receive  You always have the right to refuse treatment  The above information is an  only  It is not intended as medical advice for individual conditions or treatments  Talk to your doctor, nurse or pharmacist before following any medical regimen to see if it is safe and effective for you  © Copyright [employer] 2022 Information is for End User's use only and may not be sold, redistributed or otherwise used for commercial purposes   All illustrations and images included in [facility] are the copyrighted property of A D A M [employer] Inc  or [facility] Nelda Chong

## 2022-04-07 NOTE — TELEPHONE ENCOUNTER
Upon review of the In Basket request we were able to locate, review, and update the patient chart as requested for Mammogram     Any additional questions or concerns should be emailed to the Practice Liaisons via [email or web address] email, please do not reply via In Basket      Thank you  Zainab Gibbs

## 2022-05-05 DIAGNOSIS — M54.41 ACUTE RIGHT-SIDED LOW BACK PAIN WITH RIGHT-SIDED SCIATICA: ICD-10-CM

## 2022-05-05 DIAGNOSIS — M51.36 DISC DEGENERATION, LUMBAR: ICD-10-CM

## 2022-05-05 RX ORDER — NAPROXEN 500 MG/1
TABLET ORAL
Qty: 60 TABLET | Refills: 0 | Status: SHIPPED | OUTPATIENT
Start: 2022-05-05 | End: 2022-06-08

## 2022-06-08 DIAGNOSIS — M51.36 DISC DEGENERATION, LUMBAR: ICD-10-CM

## 2022-06-08 DIAGNOSIS — M54.41 ACUTE RIGHT-SIDED LOW BACK PAIN WITH RIGHT-SIDED SCIATICA: ICD-10-CM

## 2022-06-08 RX ORDER — NAPROXEN 500 MG/1
TABLET ORAL
Qty: 60 TABLET | Refills: 0 | Status: SHIPPED | OUTPATIENT
Start: 2022-06-08 | End: 2022-07-06

## 2022-06-15 ENCOUNTER — TELEPHONE (OUTPATIENT)
Dept: FAMILY MEDICINE CLINIC | Facility: CLINIC | Age: 67
End: 2022-06-15

## 2022-06-15 DIAGNOSIS — E66.09 CLASS 1 OBESITY DUE TO EXCESS CALORIES WITH SERIOUS COMORBIDITY AND BODY MASS INDEX (BMI) OF 32.0 TO 32.9 IN ADULT: Primary | ICD-10-CM

## 2022-06-15 DIAGNOSIS — R63.5 ABNORMAL WEIGHT GAIN: ICD-10-CM

## 2022-06-15 DIAGNOSIS — R73.01 IMPAIRED FASTING GLUCOSE: ICD-10-CM

## 2022-06-15 NOTE — TELEPHONE ENCOUNTER
Spoke with pt  She is asking if you could also order a thyroid panel and fasting glucose  She said if that was not covered by her insurance, she will pay for it

## 2022-06-15 NOTE — TELEPHONE ENCOUNTER
There is no need for endocrinology visit  The only thing needed is repeat fasting sugar which I ordered and a Hb A1c a 3 months blood sugar test which I can order   If she is "prediabetic" the treatment would be weight management I have add the A1c to her lab orders

## 2022-06-17 ENCOUNTER — APPOINTMENT (OUTPATIENT)
Dept: LAB | Facility: MEDICAL CENTER | Age: 67
End: 2022-06-17
Payer: MEDICARE

## 2022-06-17 DIAGNOSIS — E66.09 CLASS 1 OBESITY DUE TO EXCESS CALORIES WITH SERIOUS COMORBIDITY AND BODY MASS INDEX (BMI) OF 32.0 TO 32.9 IN ADULT: ICD-10-CM

## 2022-06-17 DIAGNOSIS — R73.01 IMPAIRED FASTING GLUCOSE: ICD-10-CM

## 2022-06-17 DIAGNOSIS — R63.5 ABNORMAL WEIGHT GAIN: ICD-10-CM

## 2022-06-17 DIAGNOSIS — Z79.899 HIGH RISK MEDICATION USE: ICD-10-CM

## 2022-06-17 DIAGNOSIS — E78.2 MIXED HYPERLIPIDEMIA: ICD-10-CM

## 2022-06-17 LAB
EST. AVERAGE GLUCOSE BLD GHB EST-MCNC: 105 MG/DL
HBA1C MFR BLD: 5.3 %
TSH SERPL DL<=0.05 MIU/L-ACNC: 2.48 UIU/ML (ref 0.45–4.5)

## 2022-06-17 PROCEDURE — 84443 ASSAY THYROID STIM HORMONE: CPT

## 2022-06-17 PROCEDURE — 36415 COLL VENOUS BLD VENIPUNCTURE: CPT

## 2022-06-17 PROCEDURE — 83036 HEMOGLOBIN GLYCOSYLATED A1C: CPT

## 2022-07-14 ENCOUNTER — CONSULT (OUTPATIENT)
Dept: BARIATRICS | Facility: CLINIC | Age: 67
End: 2022-07-14
Payer: MEDICARE

## 2022-07-14 VITALS
BODY MASS INDEX: 33.03 KG/M2 | WEIGHT: 193.5 LBS | HEART RATE: 83 BPM | DIASTOLIC BLOOD PRESSURE: 90 MMHG | HEIGHT: 64 IN | SYSTOLIC BLOOD PRESSURE: 140 MMHG

## 2022-07-14 DIAGNOSIS — E78.2 MIXED HYPERLIPIDEMIA: ICD-10-CM

## 2022-07-14 DIAGNOSIS — E66.9 OBESITY, CLASS I, BMI 30-34.9: Primary | ICD-10-CM

## 2022-07-14 PROBLEM — E66.811 OBESITY, CLASS I, BMI 30-34.9: Status: ACTIVE | Noted: 2022-07-14

## 2022-07-14 PROCEDURE — 99203 OFFICE O/P NEW LOW 30 MIN: CPT | Performed by: NURSE PRACTITIONER

## 2022-07-14 RX ORDER — DIAZEPAM 5 MG/1
TABLET ORAL
COMMUNITY
Start: 2022-06-02 | End: 2022-07-14 | Stop reason: ALTCHOICE

## 2022-07-14 RX ORDER — MELOXICAM 7.5 MG/1
7.5 TABLET ORAL DAILY
COMMUNITY
Start: 2022-07-07 | End: 2022-09-15 | Stop reason: ALTCHOICE

## 2022-07-14 RX ORDER — METHOCARBAMOL 750 MG/1
TABLET, FILM COATED ORAL
COMMUNITY
Start: 2022-06-30

## 2022-07-14 NOTE — ASSESSMENT & PLAN NOTE
- Discussed options of HealthyCORE-Intensive Lifestyle Intervention Program, Very Low Calorie Diet-VLCD and Conservative Program and the role of weight loss medications  - Explained the importance of making lifestyle changes first before starting any anti-obesity medications  Patient should demonstrate lifestyle changes first before anti-obesity medication can be initiated  - Patient is interested in pursuing Conservative Program  - Initial weight loss goal of 5-10% weight loss for improved health  - Weight loss can improve patient's co-morbid conditions and/or prevent weight-related complications  - Stop Bang 2/8  - Labs reviewed: TSH and A1C 6/17/2022; lipid panel and CMP 4/5/2022  Chol and LDL elevated, which will likely improve with weight loss  Otherwise, labs within acceptable range  - Check fasting insulin  Goals:  Do not skip meals  Food log (ie ) www myfitnesspal com,sparkpeople  com,loseit com,calorieking  com,etc  baritastic (use skinnytaste  com, dietdoctor  com or smartphone roxanne RECESS. for recipes)  No sugary beverages  Keep up the great work with water intake at least 64oz of water daily  Increase physical activity by 10 minutes daily  Gradually increase physical activity to a goal of 5 days per week for 30 minutes of MODERATE intensity PLUS 2 days per week of FULL BODY resistance training (use smartphone apps [employer], Home Workout, etc )  - Increase walking to 3-4 times per week for 1 5 miles  - 8693-4867 calories per day  Sample menu given  - Start food logging, weighing and measuring, including milk

## 2022-07-14 NOTE — PROGRESS NOTES
Assessment/Plan:    Obesity, Class I, BMI 30-34 9  - Discussed options of HealthyCORE-Intensive Lifestyle Intervention Program, Very Low Calorie Diet-VLCD and Conservative Program and the role of weight loss medications  - Explained the importance of making lifestyle changes first before starting any anti-obesity medications  Patient should demonstrate lifestyle changes first before anti-obesity medication can be initiated  - Patient is interested in pursuing Conservative Program  - Initial weight loss goal of 5-10% weight loss for improved health  - Weight loss can improve patient's co-morbid conditions and/or prevent weight-related complications  - Stop Bang 2/8  - Labs reviewed: TSH and A1C 6/17/2022; lipid panel and CMP 4/5/2022  Chol and LDL elevated, which will likely improve with weight loss  Otherwise, labs within acceptable range  - Check fasting insulin  Goals:  Do not skip meals  Food log (ie ) www myfitnesspal com,sparkpeople  com,loseit com,calorieking  com,etc  baritastic (use skinnytaste  com, dietdoctor  com or smartphone roxanne [employer] for recipes)  No sugary beverages  Keep up the great work with water intake at least 64oz of water daily  Increase physical activity by 10 minutes daily  Gradually increase physical activity to a goal of 5 days per week for 30 minutes of MODERATE intensity PLUS 2 days per week of FULL BODY resistance training (use smartphone apps [employer]PE[employer]!, Home Workout, etc )  - Increase walking to 3-4 times per week for 1 5 miles  - 9385-2947 calories per day  Sample menu given  - Start food logging, weighing and measuring, including milk  Mixed hyperlipidemia  - Will likely improve with weight loss and lifestyle modification  Raymundo Goldman was seen today for consult  Diagnoses and all orders for this visit:    Obesity, Class I, BMI 30-34 9  -     Insulin, fasting;  Future    Mixed hyperlipidemia            Follow up in approximately 2 months with Non-Surgical Physician/Advanced Practitioner  Subjective:   Chief Complaint   Patient presents with   [address] [facility] CONSULT       Patient ID: Dillon Mancilla  is a 78 y o  female with excess weight/obesity here to pursue weight management  Previous notes and records have been reviewed      Past Medical History:   Diagnosis Date    Achilles tendinitis of right lower extremity     last assessed - [id number]    Generalized anxiety disorder 6/6/2019    Hyperlipidemia     last assessed - [id number]    Sciatica, left side     last assessed - [id number]     Past Surgical History:   Procedure Laterality Date    COLONOSCOPY      Complete colonoscopy - nml; resolved - [id number]       HPI:  Wt Readings from Last 20 Encounters:   07/14/22 87 8 kg (193 lb 8 oz)   04/07/22 85 3 kg (188 lb)   03/21/22 87 3 kg (192 lb 6 oz)   11/30/21 87 2 kg (192 lb 3 2 oz)   10/29/21 85 kg (187 lb 8 oz)   08/11/21 86 4 kg (190 lb 6 4 oz)   10/22/20 83 3 kg (183 lb 9 6 oz)   11/12/19 86 7 kg (191 lb 3 2 oz)   06/06/19 85 7 kg (189 lb)   10/30/18 84 8 kg (187 lb)   02/06/18 85 2 kg (187 lb 12 8 oz)   12/19/17 84 4 kg (186 lb)   11/27/17 84 4 kg (186 lb)   10/03/17 83 5 kg (184 lb)   06/22/17 82 2 kg (181 lb 4 oz)   12/14/16 80 kg (176 lb 6 1 oz)   08/29/16 79 4 kg (175 lb)   06/27/16 80 3 kg (177 lb 2 1 oz)   06/01/16 81 6 kg (180 lb)   03/24/16 88 5 kg (195 lb)     Obesity/Excess Weight:  Severity: Mild  Onset:  15 years    Modifiers: Diet and Exercise  Contributing factors: Stress/Emotional Eating  Associated symptoms: increased joint pain and decreased exercise capacity    Hydration: at least 64 oz water, 2-3 cups hot tea per week occ with splenda and skim milk, 1 cup daily diet iced tea, 3 cans diet V8 juice weekly, unsweetened almond milk with protein shakes  Alcohol: 1 drink per week  Smoking: denies  Exercise: walking 2-3 times per week for 1 5 miles   Occupation: has own business, currently working from home  Sleep: 5-6 hours  STOP bang: 2/8    Highest weight: current weight  Current weight: 193 5 lbs  Goal weight: 140-150 lbs    Colonoscopy: UTD, due 11/2028  Mammogram: UTD, due 12/2022    The following portions of the patient's history were reviewed and updated as appropriate: allergies, current medications, past family history, past medical history, past social history, past surgical history, and problem list     Review of Systems   HENT: Negative for sore throat  Respiratory: Negative for cough and shortness of breath  Cardiovascular: Negative for chest pain and palpitations  Gastrointestinal: Negative for constipation, diarrhea, nausea and vomiting  Denies GERD   Endocrine: Negative for cold intolerance and heat intolerance  Genitourinary: Negative for dysuria  Musculoskeletal: Positive for arthralgias and back pain  Skin: Negative for rash  Neurological: Negative for headaches  Psychiatric/Behavioral: Negative for suicidal ideas (or HI)  Denies depression and anxiety       Objective:  /90 (BP Location: Left arm, Patient Position: Sitting, Cuff Size: Standard)   Pulse 83   Ht 5' 3 5" (1 613 m)   Wt 87 8 kg (193 lb 8 oz)   BMI 33 74 kg/m²     Physical Exam  Vitals and nursing note reviewed  Constitutional   General appearance: Abnormal   well developed and obese  Eyes No conjunctival injection  Ears, Nose, Mouth, and Throat Oral mucosa moist    Pulmonary   Respiratory effort: No increased work of breathing or signs of respiratory distress  Cardiovascular     Examination of extremities for edema and/or varicosities: Normal   no edema  Abdomen   Abdomen: Abnormal   The abdomen was obese      Musculoskeletal   Gait and station: Normal     Psychiatric   Orientation to person, place and time: Normal     Affect: appropriate

## 2022-07-21 ENCOUNTER — APPOINTMENT (OUTPATIENT)
Dept: LAB | Facility: MEDICAL CENTER | Age: 67
End: 2022-07-21
Payer: MEDICARE

## 2022-07-21 DIAGNOSIS — E66.9 OBESITY, CLASS I, BMI 30-34.9: ICD-10-CM

## 2022-07-21 LAB — INSULIN SERPL-ACNC: 11.1 MU/L (ref 3–25)

## 2022-07-21 PROCEDURE — 36415 COLL VENOUS BLD VENIPUNCTURE: CPT

## 2022-07-21 PROCEDURE — 83525 ASSAY OF INSULIN: CPT

## 2022-07-22 ENCOUNTER — TELEPHONE (OUTPATIENT)
Dept: BARIATRICS | Facility: CLINIC | Age: 67
End: 2022-07-22

## 2022-07-22 NOTE — TELEPHONE ENCOUNTER
----- Message from Trey Cash 29 sent at 7/22/2022  7:48 AM EDT -----  Please let the patient know I have reviewed her fasting insulin and it was within normal range   ThanksKathy

## 2022-08-11 ENCOUNTER — EVALUATION (OUTPATIENT)
Dept: PHYSICAL THERAPY | Facility: MEDICAL CENTER | Age: 67
End: 2022-08-11
Payer: MEDICARE

## 2022-08-11 DIAGNOSIS — M25.551 ACUTE RIGHT HIP PAIN: Primary | ICD-10-CM

## 2022-08-11 PROCEDURE — 97110 THERAPEUTIC EXERCISES: CPT | Performed by: PHYSICAL THERAPIST

## 2022-08-11 PROCEDURE — 97140 MANUAL THERAPY 1/> REGIONS: CPT | Performed by: PHYSICAL THERAPIST

## 2022-08-11 NOTE — PROGRESS NOTES
PT Evaluation     Today's date: 2022  Patient name: Moraima Mejia  : 1955  MRN: [id number]  Referring provider: Reba Casey DO  Dx:   Encounter Diagnosis   Name Primary?  Acute right hip pain Yes                  Assessment  Assessment details: Moraima Mejia is a 78 y o  y/o female who presents with complaints of R hip pain  The patients greatest concern is not being able to perform daily activities without pain  Primary movement impairment diagnosis of pelvic malalignment, lumbar hypomobility, and neural tension, resulting in pathoanatomical symptoms of acute right hip pain, which limits her ability to perform daily activities without pain  Pt  will benefit from skilled PT services that includes manual therapy techniques to enhance tissue extensibility, neuromuscular re-education to facilitate motor control, therapeutic exercise to increase functional mobility, and modalities prn to reduce pain and inflammation    Impairments: abnormal muscle firing, abnormal or restricted ROM, activity intolerance, impaired physical strength, lacks appropriate home exercise program, pain with function and poor body mechanics  Understanding of Dx/Px/POC: good   Prognosis: good    Goals  Impairment Goals  - Pt I with initial HEP in 1-2 visits  - Improve ROM to [facility] in 4-6 weeks  - Increase strength to 5/5 in all affected areas in 4-6 weeks    Functional Goals  - Increase Functional Status Measure to: 72 in 6-8 weeks  - Patient will be independent with comprehensive HEP in 6-8 weeks  - Ambulation is improved to prior level of function in 6-8 weeks  - Stair climbing is improved to prior level of function in 6-8 weeks  - Squatting is improved to prior level of function in 6-8 weeks    Plan  Patient would benefit from: PT eval  Planned modality interventions: thermotherapy: hydrocollator packs  Planned therapy interventions: manual therapy, joint mobilization, neuromuscular re-education, patient education, therapeutic activities, therapeutic exercise, stretching, strengthening, home exercise program, graded exercise, flexibility, abdominal trunk stabilization and body mechanics training  Frequency: 2x week  Duration in weeks: 8  Treatment plan discussed with: patient      Subjective Evaluation    History of Present Illness  Mechanism of injury: Patient reports that R hip pain began about 3 weeks ago without a JUANITA that she can recall  She reports that the pan is achey in nature and located on the right side  Occasionally, she experiences R buttocks pain  Patient has a hx of low back pain and had an MRI in the past, which revealed DDD  Patient saw Dr Samm Pickens who performed a CSI in the R hip last 2022  Patient reports mild relief since the injection  X-ray of the hip revealed some OA  Patient is currently taking meloxicam   She would like to be able to walk and perform daily activities without pain  Pain  Current pain rating: 3  At best pain rating: 3  At worst pain ratin  Location: R hip; low back  Quality: dull ache  Relieving factors: medications    Treatments  Current treatment: physical therapy  Patient Goals  Patient goals for therapy: increased strength, independence with ADLs/IADLs and decreased pain  Patient goal: Patient would like to be able to walk a mile without pain and negotiate steps without pain  Objective     Concurrent Complaints  Negative for night pain, disturbed sleep, bladder dysfunction, bowel dysfunction, saddle (S4) numbness, cardiac problem, kidney problem, gallbladder problem, stomach problem, ulcer, appendix problem, spleen problem, pancreas problem, history of cancer, history of trauma and infection    Postural Observations  Seated posture: fair  Standing posture: fair        Palpation     Right   Hypertonic in the piriformis  Tenderness of the piriformis and TFL  Tenderness     Lumbar Spine  Tenderness in the facet joint (L5-S1)       Right Hip Tenderness in the PSIS and greater trochanter  Neurological Testing     Sensation     Lumbar   Left   Intact: light touch    Right   Intact: light touch    Reflexes   Left   Patellar (L4): normal (2+)  Achilles (S1): normal (2+)    Right   Patellar (L4): normal (2+)  Achilles (S1): normal (2+)    Active Range of Motion     Lumbar   Flexion:  Restriction level: minimal  Extension:  Restriction level: moderate  Left lateral flexion:  Restriction level: minimal  Right lateral flexion:  Restriction level: minimal  Left rotation:  Restriction level: minimal  Right rotation:  Restriction level: minimal    Additional Active Range of Motion Details  No pain c/ lumbar AROM    Passive Range of Motion   Left Hip   Normal passive range of motion    Right Hip   Normal passive range of motion    Additional Passive Range of Motion Details  PAIVM:  Pain and hypomobility c/ UPAs at L5-S1 on the right    Joint Play     Hypomobile: L1, L2, L3, L4, L5 and S1     Pain: L5 and S1     Strength/Myotome Testing     Lumbar   Left   Heel walk: normal  Toe walk: normal    Right   Heel walk: normal  Toe walk: normal    Left Hip   Planes of Motion   Flexion: 5  Extension: 4  Abduction: 4  Adduction: 4    Right Hip   Planes of Motion   Flexion: 3+  Extension: 3+  Abduction: 3+  Adduction: 4-    Left Knee   Flexion: 4+  Extension: 5    Right Knee   Flexion: 4-  Extension: 5    Left Ankle/Foot   Dorsiflexion: 5  Plantar flexion: 5  Great toe extension: 5    Right Ankle/Foot   Dorsiflexion: 5  Plantar flexion: 5  Great toe extension: 5    Muscle Activation     Additional Muscle Activation Details  Patient demonstrates poor coordination c/ TA/multifidus co-contraction  Tests     Lumbar     Right   Positive passive SLR and slump test    Negative crossed SLR, femoral stretch and quadrant  Right Hip   Positive Ely's, FADIR, long sit and Evelyn  Negative SONIA    90/90 SLR: Positive  Right Knee   Positive peroneal nerve tension  Additional Tests Details  (+) Functional leg length discrepancy c/ R LE shorter    Functional Assessment      Squat    Sitting toward left side          Precautions:  N/A    Manuals 8/11            Long axis distraction AZ            UPAs L5-S1 R AZ            Sciatic n glide AZ                         Neuro Re-Ed             TA isometrics 10x5s                                                   Ther Ex             Hip add 20x5s            Bridges 2x10                         Sciatic n glide 10x            Piriformis str sup 3x30s                                                   Ther Activity                                       Gait Training                                       Modalities                                              Pepper Roy, PT  8/12/2022,7:49 AM

## 2022-08-11 NOTE — LETTER
2022    Elvis Blanchard DO  [address] [facility]    Patient: Yumiko Braga   YOB: 1955   Date of Visit: 2022     Encounter Diagnosis     ICD-10-CM    1  Acute right hip pain  M25 551        Dear Dr Samm Pickens:    Thank you for your recent referral of Yumiko Braga  Please review the attached evaluation summary from Kaya's recent visit  Please verify that you agree with the plan of care by signing the attached order  If you have any questions or concerns, please do not hesitate to call  I sincerely appreciate the opportunity to share in the care of one of your patients and hope to have another opportunity to work with you in the near future  Sincerely,    Rehana Gutierrez, PT      Referring Provider:      I certify that I have read the below Plan of Care and certify the need for these services furnished under this plan of treatment while under my care  Elvis Blanchard   [address] [place] [address]  Via Fax: [phone]          PT Evaluation     Today's date: 2022  Patient name: Yumiko Braga  : 1955  MRN: [id number]  Referring provider: Luis Alberto Vogt DO  Dx:   Encounter Diagnosis   Name Primary?  Acute right hip pain Yes                  Assessment  Assessment details: Yumiko Braga is a 78 y o  y/o female who presents with complaints of R hip pain  The patients greatest concern is not being able to perform daily activities without pain  Primary movement impairment diagnosis of pelvic malalignment, lumbar hypomobility, and neural tension, resulting in pathoanatomical symptoms of acute right hip pain, which limits her ability to perform daily activities without pain    Pt  will benefit from skilled PT services that includes manual therapy techniques to enhance tissue extensibility, neuromuscular re-education to facilitate motor control, therapeutic exercise to increase functional mobility, and modalities prn to reduce pain and inflammation  Impairments: abnormal muscle firing, abnormal or restricted ROM, activity intolerance, impaired physical strength, lacks appropriate home exercise program, pain with function and poor body mechanics  Understanding of Dx/Px/POC: good   Prognosis: good    Goals  Impairment Goals  - Pt I with initial HEP in 1-2 visits  - Improve ROM to [facility] in 4-6 weeks  - Increase strength to 5/5 in all affected areas in 4-6 weeks    Functional Goals  - Increase Functional Status Measure to: 72 in 6-8 weeks  - Patient will be independent with comprehensive HEP in 6-8 weeks  - Ambulation is improved to prior level of function in 6-8 weeks  - Stair climbing is improved to prior level of function in 6-8 weeks  - Squatting is improved to prior level of function in 6-8 weeks    Plan  Patient would benefit from: PT eval  Planned modality interventions: thermotherapy: hydrocollator packs  Planned therapy interventions: manual therapy, joint mobilization, neuromuscular re-education, patient education, therapeutic activities, therapeutic exercise, stretching, strengthening, home exercise program, graded exercise, flexibility, abdominal trunk stabilization and body mechanics training  Frequency: 2x week  Duration in weeks: 8  Treatment plan discussed with: patient      Subjective Evaluation    History of Present Illness  Mechanism of injury: Patient reports that R hip pain began about 3 weeks ago without a JUANITA that she can recall  She reports that the pan is achey in nature and located on the right side  Occasionally, she experiences R buttocks pain  Patient has a hx of low back pain and had an MRI in the past, which revealed DDD  Patient saw Dr Karan Garcia who performed a CSI in the R hip last Tuesday 8/2/2022  Patient reports mild relief since the injection  X-ray of the hip revealed some OA    Patient is currently taking meloxicam   She would like to be able to walk and perform daily activities without pain   Pain  Current pain rating: 3  At best pain rating: 3  At worst pain ratin  Location: R hip; low back  Quality: dull ache  Relieving factors: medications    Treatments  Current treatment: physical therapy  Patient Goals  Patient goals for therapy: increased strength, independence with ADLs/IADLs and decreased pain  Patient goal: Patient would like to be able to walk a mile without pain and negotiate steps without pain  Objective     Concurrent Complaints  Negative for night pain, disturbed sleep, bladder dysfunction, bowel dysfunction, saddle (S4) numbness, cardiac problem, kidney problem, gallbladder problem, stomach problem, ulcer, appendix problem, spleen problem, pancreas problem, history of cancer, history of trauma and infection    Postural Observations  Seated posture: fair  Standing posture: fair        Palpation     Right   Hypertonic in the piriformis  Tenderness of the piriformis and TFL  Tenderness     Lumbar Spine  Tenderness in the facet joint (L5-S1)  Right Hip   Tenderness in the PSIS and greater trochanter       Neurological Testing     Sensation     Lumbar   Left   Intact: light touch    Right   Intact: light touch    Reflexes   Left   Patellar (L4): normal (2+)  Achilles (S1): normal (2+)    Right   Patellar (L4): normal (2+)  Achilles (S1): normal (2+)    Active Range of Motion     Lumbar   Flexion:  Restriction level: minimal  Extension:  Restriction level: moderate  Left lateral flexion:  Restriction level: minimal  Right lateral flexion:  Restriction level: minimal  Left rotation:  Restriction level: minimal  Right rotation:  Restriction level: minimal    Additional Active Range of Motion Details  No pain c/ lumbar AROM    Passive Range of Motion   Left Hip   Normal passive range of motion    Right Hip   Normal passive range of motion    Additional Passive Range of Motion Details  PAIVM:  Pain and hypomobility c/ UPAs at L5-S1 on the right    Joint Play Hypomobile: L1, L2, L3, L4, L5 and S1     Pain: L5 and S1     Strength/Myotome Testing     Lumbar   Left   Heel walk: normal  Toe walk: normal    Right   Heel walk: normal  Toe walk: normal    Left Hip   Planes of Motion   Flexion: 5  Extension: 4  Abduction: 4  Adduction: 4    Right Hip   Planes of Motion   Flexion: 3+  Extension: 3+  Abduction: 3+  Adduction: 4-    Left Knee   Flexion: 4+  Extension: 5    Right Knee   Flexion: 4-  Extension: 5    Left Ankle/Foot   Dorsiflexion: 5  Plantar flexion: 5  Great toe extension: 5    Right Ankle/Foot   Dorsiflexion: 5  Plantar flexion: 5  Great toe extension: 5    Muscle Activation     Additional Muscle Activation Details  Patient demonstrates poor coordination c/ TA/multifidus co-contraction  Tests     Lumbar     Right   Positive passive SLR and slump test    Negative crossed SLR, femoral stretch and quadrant  Right Hip   Positive Ely's, FADIR, long sit and Evelyn  Negative SONIA    90/90 SLR: Positive  Right Knee   Positive peroneal nerve tension  Additional Tests Details  (+) Functional leg length discrepancy c/ R LE shorter    Functional Assessment      Squat    Sitting toward left side          Precautions:  N/A    Manuals 8/11            Long axis distraction AZ            UPAs L5-S1 R AZ            Sciatic n glide AZ                         Neuro Re-Ed             TA isometrics 10x5s                                                   Ther Ex             Hip add 20x5s            Bridges 2x10                         Sciatic n glide 10x            Piriformis str sup 3x30s                                                   Ther Activity                                       Gait Training                                       Modalities                                              Jayde Ortega, PT  8/12/2022,7:49 AM

## 2022-08-12 PROCEDURE — 97161 PT EVAL LOW COMPLEX 20 MIN: CPT | Performed by: PHYSICAL THERAPIST

## 2022-08-22 ENCOUNTER — OFFICE VISIT (OUTPATIENT)
Dept: PHYSICAL THERAPY | Facility: MEDICAL CENTER | Age: 67
End: 2022-08-22
Payer: MEDICARE

## 2022-08-22 DIAGNOSIS — M25.551 ACUTE RIGHT HIP PAIN: Primary | ICD-10-CM

## 2022-08-22 PROCEDURE — 97140 MANUAL THERAPY 1/> REGIONS: CPT | Performed by: PHYSICAL THERAPIST

## 2022-08-22 PROCEDURE — 97110 THERAPEUTIC EXERCISES: CPT | Performed by: PHYSICAL THERAPIST

## 2022-08-22 NOTE — PROGRESS NOTES
Daily Note     Today's date: 2022  Patient name: Layo Aiken  : 1955  MRN: [id number]  Referring provider: Ximena Chopra DO  Dx:   Encounter Diagnosis     ICD-10-CM    1  Acute right hip pain  M25 551                   Subjective:  Patient states that she is doing well  Objective: See treatment diary below  Assessment:  Patient presents without radiating R LE pain  She demonstrates pelvic malalignment, which was corrected c/ long axis distraction  Patient did well c/ follow up exercises  Tolerated treatment well  Patient would benefit from continued PT  Plan: Continue per plan of care        Precautions:  N/A    Manuals            Long axis distraction AZ AZ           UPAs L5-S1 R AZ AZ           Sciatic n glide AZ AZ                        Neuro Re-Ed             TA isometrics 10x5s 20x5s                                                  Ther Ex             Hip add 20x5s 30x5s           Bridges 2x10 3x10                        Sciatic n glide 10x 10x           Piriformis str sup 3x30s 3x30s                        Standing hip abd  2x10           Standing hip ext  2x10           Ther Activity                                       Gait Training                                       Modalities

## 2022-08-26 ENCOUNTER — OFFICE VISIT (OUTPATIENT)
Dept: PHYSICAL THERAPY | Facility: MEDICAL CENTER | Age: 67
End: 2022-08-26
Payer: MEDICARE

## 2022-08-26 DIAGNOSIS — M25.551 ACUTE RIGHT HIP PAIN: Primary | ICD-10-CM

## 2022-08-26 PROCEDURE — 97140 MANUAL THERAPY 1/> REGIONS: CPT | Performed by: PHYSICAL THERAPIST

## 2022-08-26 PROCEDURE — 97110 THERAPEUTIC EXERCISES: CPT | Performed by: PHYSICAL THERAPIST

## 2022-08-26 NOTE — PROGRESS NOTES
Daily Note     Today's date: 2022  Patient name: Dinah Gutierrez  : 1955  MRN: [id number]  Referring provider: Merline Knack, DO  Dx:   Encounter Diagnosis     ICD-10-CM    1  Acute right hip pain  M25 551                   Subjective:  Patient states that she is doing well  Objective: See treatment diary below  Assessment:  Patient demonstrates good tolerance to exercise progressions without pain  Tolerated treatment well  Patient would benefit from continued PT  Plan: Continue per plan of care        Precautions:  N/A    Manuals           Long axis distraction AZ AZ AZ          UPAs L5-S1 R AZ AZ AZ          Sciatic n glide AZ AZ                        Neuro Re-Ed             TA isometrics 10x5s 20x5s 30x5s                                                 Ther Ex             Hip add 20x5s 30x5s 30x5s          Bridges 2x10 3x10 3x10          Hip abd   30x5s          Sciatic n glide 10x 10x 10x          Piriformis str sup 3x30s 3x30s 3x30s          Clams   30x5s          Standing hip abd  2x10 3x10          Standing hip ext  2x10 3x10          Ther Activity                                       Gait Training                                       Modalities

## 2022-08-30 ENCOUNTER — OFFICE VISIT (OUTPATIENT)
Dept: PHYSICAL THERAPY | Facility: MEDICAL CENTER | Age: 67
End: 2022-08-30
Payer: MEDICARE

## 2022-08-30 DIAGNOSIS — M25.551 ACUTE RIGHT HIP PAIN: Primary | ICD-10-CM

## 2022-08-30 PROCEDURE — 97110 THERAPEUTIC EXERCISES: CPT | Performed by: PHYSICAL THERAPIST

## 2022-08-30 PROCEDURE — 97140 MANUAL THERAPY 1/> REGIONS: CPT | Performed by: PHYSICAL THERAPIST

## 2022-08-30 NOTE — PROGRESS NOTES
Daily Note     Today's date: 2022  Patient name: Layo Aiken  : 1955  MRN: [id number]  Referring provider: Ximena Chopra DO  Dx:   Encounter Diagnosis     ICD-10-CM    1  Acute right hip pain  M25 551                   Subjective:  Patient states that she was feeling good, but hurt her back when rotation to the left the other day  Objective: See treatment diary below    Assessment:  Patient presents c/ right sided low back and buttocks pain  She denies R LE radiating symptoms  Her hip is feeling better  Patient demonstrates pelvic malalignment that was corrected c/ manuals  Did not perform all exercises today d/t LBP  Tolerated treatment well  Patient would benefit from continued PT  Plan: Continue per plan of care        Precautions:  N/A    Manuals          Long axis distraction AZ AZ AZ AZ         UPAs L5-S1 R AZ AZ AZ          Sciatic n glide AZ AZ           Piriformis release    AZ         Neuro Re-Ed             TA isometrics 10x5s 20x5s 30x5s 30x5s                                                Ther Ex             Hip add 20x5s 30x5s 30x5s 30x5s         Bridges 2x10 3x10 3x10 3x10         Hip abd   30x5s 30x5s         Sciatic n glide 10x 10x 10x 10x         Piriformis str sup 3x30s 3x30s 3x30s 3x30s         Clams   30x5s np         Standing hip abd  2x10 3x10 np         Standing hip ext  2x10 3x10 np         Ther Activity                                       Gait Training                                       Modalities

## 2022-09-02 ENCOUNTER — OFFICE VISIT (OUTPATIENT)
Dept: PHYSICAL THERAPY | Facility: MEDICAL CENTER | Age: 67
End: 2022-09-02
Payer: MEDICARE

## 2022-09-02 DIAGNOSIS — M25.551 ACUTE RIGHT HIP PAIN: Primary | ICD-10-CM

## 2022-09-02 PROCEDURE — 97110 THERAPEUTIC EXERCISES: CPT | Performed by: PHYSICAL THERAPIST

## 2022-09-02 PROCEDURE — 97140 MANUAL THERAPY 1/> REGIONS: CPT | Performed by: PHYSICAL THERAPIST

## 2022-09-02 NOTE — PROGRESS NOTES
Daily Note     Today's date: 2022  Patient name: Isiah Preston  : 1955  MRN: [id number]  Referring provider: Brisa Hurtado DO  Dx:   Encounter Diagnosis     ICD-10-CM    1  Acute right hip pain  M25 551                   Subjective:  Patient states that she is doing well  Objective: See treatment diary below    Assessment: Tolerated treatment well  Patient would benefit from continued PT    Plan: Continue per plan of care        Precautions:  N/A    Manuals         Long axis distraction AZ AZ AZ AZ AZ        UPAs L5-S1 R AZ AZ AZ          Sciatic n glide AZ AZ           Piriformis release    AZ AZ        Neuro Re-Ed             TA isometrics 10x5s 20x5s 30x5s 30x5s 30x5s                                               Ther Ex             Hip add 20x5s 30x5s 30x5s 30x5s 30x5s        Bridges 2x10 3x10 3x10 3x10 3x10 5s        Hip abd   30x5s 30x5s 30x5s blue        Sciatic n glide 10x 10x 10x 10x 10x        Piriformis str sup 3x30s 3x30s 3x30s 3x30s 3x30s        Clams   30x5s np 30x5s        Standing hip abd  2x10 3x10 np 3x10        Standing hip ext  2x10 3x10 np 3x10        Ther Activity                                       Gait Training                                       Modalities

## 2022-09-07 ENCOUNTER — OFFICE VISIT (OUTPATIENT)
Dept: PHYSICAL THERAPY | Facility: MEDICAL CENTER | Age: 67
End: 2022-09-07
Payer: MEDICARE

## 2022-09-07 DIAGNOSIS — M25.551 ACUTE RIGHT HIP PAIN: Primary | ICD-10-CM

## 2022-09-07 PROCEDURE — 97110 THERAPEUTIC EXERCISES: CPT | Performed by: PHYSICAL THERAPIST

## 2022-09-07 PROCEDURE — 97140 MANUAL THERAPY 1/> REGIONS: CPT | Performed by: PHYSICAL THERAPIST

## 2022-09-07 NOTE — PROGRESS NOTES
Daily Note     Today's date: 2022  Patient name: Kirill Samano  : 1955  MRN: [id number]  Referring provider: Pooja Werner DO  Dx:   Encounter Diagnosis     ICD-10-CM    1  Acute right hip pain  M25 551                   Subjective:  Patient states that she is doing well  Objective: See treatment diary below  Assessment:  Patient demonstrates R hip flexor tightness and good tolerance to exercise progressions  Tolerated treatment well  Patient would benefit from continued PT  Plan: Continue per plan of care        Precautions:  N/A    Manuals        Long axis distraction AZ AZ AZ AZ AZ AZ       UPAs L5-S1 R AZ AZ AZ          Sciatic n glide AZ AZ           Piriformis release    AZ AZ AZ       Hip flexor release      AZ       Neuro Re-Ed             TA isometrics 10x5s 20x5s 30x5s 30x5s 30x5s 30x5s                                              Ther Ex             Hip add 20x5s 30x5s 30x5s 30x5s 30x5s 30x5s       Bridges 2x10 3x10 3x10 3x10 3x10 5s 3x12 5s       Hip abd   30x5s 30x5s 30x5s blue 30x5s blue       Sciatic n glide 10x 10x 10x 10x 10x 10x       Piriformis str sup 3x30s 3x30s 3x30s 3x30s 3x30s 3x30s       Clams   30x5s np 30x5s 30x5s       Standing hip abd  2x10 3x10 np 3x10 3x12       Standing hip ext  2x10 3x10 np 3x10 3x12       Hip flexor str EOT      3x30s       Prone quad str      3x30s       Ther Activity                                       Gait Training                                       Modalities

## 2022-09-09 ENCOUNTER — OFFICE VISIT (OUTPATIENT)
Dept: PHYSICAL THERAPY | Facility: MEDICAL CENTER | Age: 67
End: 2022-09-09
Payer: MEDICARE

## 2022-09-09 DIAGNOSIS — M25.551 ACUTE RIGHT HIP PAIN: Primary | ICD-10-CM

## 2022-09-09 PROCEDURE — 97140 MANUAL THERAPY 1/> REGIONS: CPT | Performed by: PHYSICAL THERAPIST

## 2022-09-09 PROCEDURE — 97110 THERAPEUTIC EXERCISES: CPT | Performed by: PHYSICAL THERAPIST

## 2022-09-09 NOTE — PROGRESS NOTES
Daily Note     Today's date: 2022  Patient name: Yumiko Braga  : 1955  MRN: [id number]  Referring provider: Luis Alberto Vogt DO  Dx:   Encounter Diagnosis     ICD-10-CM    1  Acute right hip pain  M25 551                   Subjective:  Patient states that her hip is doing well, but her low back is sore today  Objective: See treatment diary below  Assessment: Tolerated treatment well  Patient would benefit from continued PT  Plan: Continue per plan of care        Precautions:  N/A    Manuals       Long axis distraction AZ AZ AZ AZ AZ AZ AZ      UPAs L5-S1 R AZ AZ AZ          Sciatic n glide AZ AZ           Piriformis release    AZ AZ AZ AZ      Hip flexor release      AZ       Neuro Re-Ed             TA isometrics 10x5s 20x5s 30x5s 30x5s 30x5s 30x5s 30x5s                                             Ther Ex             Hip add 20x5s 30x5s 30x5s 30x5s 30x5s 30x5s 30x5s      Bridges 2x10 3x10 3x10 3x10 3x10 5s 3x12 5s np      Hip abd   30x5s 30x5s 30x5s blue 30x5s blue 30x5s black      Sciatic n glide 10x 10x 10x 10x 10x 10x 10x       Piriformis str sup 3x30s 3x30s 3x30s 3x30s 3x30s 3x30s 3x30s      Clams   30x5s np 30x5s 30x5s 30x5s      Standing hip abd  2x10 3x10 np 3x10 3x12 3x15      Standing hip ext  2x10 3x10 np 3x10 3x12 3x15      Hip flexor str EOT      3x30s 3x30s      Prone quad str      3x30s 3x30s      Ther Activity                                       Gait Training                                       Modalities

## 2022-09-13 ENCOUNTER — OFFICE VISIT (OUTPATIENT)
Dept: PHYSICAL THERAPY | Facility: MEDICAL CENTER | Age: 67
End: 2022-09-13
Payer: MEDICARE

## 2022-09-13 DIAGNOSIS — M54.50 CHRONIC RIGHT-SIDED LOW BACK PAIN WITHOUT SCIATICA: Primary | ICD-10-CM

## 2022-09-13 DIAGNOSIS — G89.29 CHRONIC RIGHT-SIDED LOW BACK PAIN WITHOUT SCIATICA: Primary | ICD-10-CM

## 2022-09-13 PROCEDURE — 97161 PT EVAL LOW COMPLEX 20 MIN: CPT | Performed by: PHYSICAL THERAPIST

## 2022-09-13 NOTE — PROGRESS NOTES
PT Evaluation     Today's date: 2022  Patient name: Bibiana Rodgers  : 1955  MRN: [id number]  Referring provider: Pita Zelaya PT  Dx:   Encounter Diagnosis   Name Primary?  Chronic right-sided low back pain without sciatica Yes                  Assessment  Assessment details: Bibiana Rodgers is a 77 y/o female who presents with complaints of chronic low back pain  The patients greatest concern is not being able to perform daily activities without back pain  Primary movement impairment diagnosis of pelvic malalignment and lumbar hypomobility, resulting in pathoanatomical symptoms of chronic right-sided low back pain without sciatica, which limits her ability to perform functional activities without pain  Pt  will benefit from skilled PT services that includes manual therapy techniques to enhance tissue extensibility, neuromuscular re-education to facilitate motor control, therapeutic exercise to increase functional mobility, and modalities prn to reduce pain and inflammation    Impairments: abnormal muscle firing, abnormal or restricted ROM, activity intolerance, impaired physical strength, lacks appropriate home exercise program, pain with function and poor body mechanics  Understanding of Dx/Px/POC: good   Prognosis: good    Goals  Impairment Goals  - Pt I with initial HEP in 1-2 visits  - Improve ROM to [facility] in 4-6 weeks  - Increase strength to 5/5 in all affected areas in 4-6 weeks    Functional Goals  - Increase Functional Status Measure to: 66+ in 6  - Patient will be independent with comprehensive HEP in 6 weeks  - Ambulation is improved to prior level of function in 6 weeks  - Stair climbing is improved to prior level of function in 6 weeks  - Squatting is improved to prior level of function in 6 weeks    Plan  Patient would benefit from: PT eval  Planned modality interventions: thermotherapy: hydrocollator packs  Planned therapy interventions: joint mobilization, manual therapy, neuromuscular re-education, patient education, strengthening, stretching, therapeutic exercise, therapeutic activities, home exercise program, graded activity, flexibility, abdominal trunk stabilization and body mechanics training  Frequency: 1-2x/week  Duration in weeks: 6  Treatment plan discussed with: patient      Subjective Evaluation    History of Present Illness  Mechanism of injury: Patient c/o chronic right sided low back pain without radiating symptoms  She denies changes in bowel/bladder and other red flags  Patient does not recall a specific JUANITA  She has been experiencing back pain for years and takes Naproxen to relieve the pain  Patient has been seen in physical therapy to address R hip bursitis that has improved  She continues c/ occasional pain in the right groin c/ walking  Patient had an X-ray of her R hip at [facility], which revealed arthritis  She also had an MRI of her low back in , which revealed DDD  Patient would like to be able walk and increase her ability to perform daily activities without back pain  Pain  Current pain ratin  At best pain ratin  At worst pain ratin  Location: Right side  Quality: tight  Relieving factors: heat      Diagnostic Tests  Abnormal MRI: OAA:   Treatments  Previous treatment: physical therapy and medication  Current treatment: physical therapy  Patient Goals  Patient goal: Patient would like to manage low back pain for increased ease c/ daily activities  She would also like to be able to walk regularly without pain        Objective     Concurrent Complaints  Negative for night pain, disturbed sleep, bladder dysfunction, bowel dysfunction, saddle (S4) numbness, cardiac problem, kidney problem, gallbladder problem, stomach problem, ulcer, appendix problem, spleen problem, pancreas problem, history of cancer, history of trauma and infection    Postural Observations  Seated posture: fair        Palpation     Right   Hypertonic in the quadratus lumborum  Tenderness of the lumbar paraspinals and quadratus lumborum  Tenderness     Lumbar Spine  Tenderness in the spinous process (L4-5)  Right Hip   Tenderness in the PSIS  Neurological Testing     Sensation     Lumbar   Left   Intact: light touch    Right   Intact: light touch    Reflexes   Left   Patellar (L4): normal (2+)  Achilles (S1): normal (2+)    Right   Patellar (L4): normal (2+)  Achilles (S1): normal (2+)    Active Range of Motion     Lumbar   Flexion:  Restriction level: minimal  Extension:  Restriction level: moderate  Left lateral flexion:  with pain Restriction level: minimal  Right lateral flexion:  Restriction level: minimal  Left rotation:  Restriction level: minimal  Right rotation:  with pain Restriction level: moderate    Passive Range of Motion     Additional Passive Range of Motion Details  PAIVM:  Pain and hypomobility at L4-S1 c/ UPAs on the right    Strength/Myotome Testing     Lumbar   Left   Heel walk: normal  Toe walk: normal    Right   Heel walk: normal  Toe walk: normal    Left Hip   Planes of Motion   Flexion: 5    Right Hip   Planes of Motion   Flexion: 5    Left Knee   Flexion: 5  Extension: 5    Right Knee   Flexion: 5  Extension: 5    Left Ankle/Foot   Dorsiflexion: 5  Plantar flexion: 5  Great toe extension: 5    Right Ankle/Foot   Dorsiflexion: 5  Plantar flexion: 5  Great toe extension: 5    Muscle Activation     Additional Muscle Activation Details  Patient demonstrates poor coordination c/ TA/multifidus co-contraction  Tests     Lumbar   Negative SIJ compression, sacroiliac distraction and sacral spring   Left   Negative passive SLR  Right   Negative passive SLR and slump test      Left Hip   Negative SONIA and FADIR  Right Hip   Positive SONIA and long sit  Negative FADIR  Additional Tests Details  (+) Functional leg length discrepancy c/ R LE shorter    Functional Assessment      Squat    Sitting toward left side       Posterior weight shift: moderate    Depth of femur height: above 90 degrees    General Comments:      Hip Comments   Limited R hip IR          Lawanda Courtney, PT  9/13/2022,11:41 AM

## 2022-09-15 ENCOUNTER — OFFICE VISIT (OUTPATIENT)
Dept: PHYSICAL THERAPY | Facility: MEDICAL CENTER | Age: 67
End: 2022-09-15
Payer: MEDICARE

## 2022-09-15 ENCOUNTER — OFFICE VISIT (OUTPATIENT)
Dept: BARIATRICS | Facility: CLINIC | Age: 67
End: 2022-09-15
Payer: MEDICARE

## 2022-09-15 VITALS
BODY MASS INDEX: 32.27 KG/M2 | OXYGEN SATURATION: 99 % | HEART RATE: 84 BPM | WEIGHT: 189 LBS | DIASTOLIC BLOOD PRESSURE: 80 MMHG | RESPIRATION RATE: 14 BRPM | SYSTOLIC BLOOD PRESSURE: 134 MMHG | HEIGHT: 64 IN

## 2022-09-15 DIAGNOSIS — G89.29 CHRONIC RIGHT-SIDED LOW BACK PAIN WITHOUT SCIATICA: Primary | ICD-10-CM

## 2022-09-15 DIAGNOSIS — M54.50 CHRONIC RIGHT-SIDED LOW BACK PAIN WITHOUT SCIATICA: Primary | ICD-10-CM

## 2022-09-15 DIAGNOSIS — M25.551 ACUTE RIGHT HIP PAIN: ICD-10-CM

## 2022-09-15 DIAGNOSIS — E78.2 MIXED HYPERLIPIDEMIA: ICD-10-CM

## 2022-09-15 DIAGNOSIS — E66.9 OBESITY, CLASS I, BMI 30-34.9: Primary | ICD-10-CM

## 2022-09-15 PROCEDURE — 97110 THERAPEUTIC EXERCISES: CPT | Performed by: PHYSICAL THERAPIST

## 2022-09-15 PROCEDURE — 99214 OFFICE O/P EST MOD 30 MIN: CPT | Performed by: NURSE PRACTITIONER

## 2022-09-15 PROCEDURE — 97140 MANUAL THERAPY 1/> REGIONS: CPT | Performed by: PHYSICAL THERAPIST

## 2022-09-15 RX ORDER — BUPROPION HYDROCHLORIDE 150 MG/1
150 TABLET ORAL EVERY MORNING
Qty: 30 TABLET | Refills: 2 | Status: SHIPPED | OUTPATIENT
Start: 2022-09-15

## 2022-09-15 NOTE — ASSESSMENT & PLAN NOTE
- Patient is pursuing Conservative Program  - Initial weight loss goal of 5-10% weight loss for improved health  - Appetite has been increased  Weight loss medications discussed and she made an informed decision to start Wellbutrin  Side effects of Wellbutrin discussed  - She denies history of seizures or glaucoma  - Wellbutrin  mg daily started 9/15/2022 at weight of 189 lbs  - Labs reviewed: TSH and A1C 6/17/2022; lipid panel and CMP 4/5/2022  Chol and LDL elevated, which will likely improve with weight loss  Otherwise, labs within acceptable range  Fasting insulin 7/21/2022 was within normal range  Initial: 193 5 lbs  Current: 189 lbs  Change: -4 5 lbs  Goal: 140-150 lbs    Goals:  Get back to food logging  6450-1831 calories per day  Continue physical therapy and increase activity as tolerated - get back to walking when able  Keep up the great work with water intake     Start Wellbutrin

## 2022-09-15 NOTE — PROGRESS NOTES
Assessment/Plan:     Obesity, Class I, BMI 30-34 9  - Patient is pursuing Conservative Program  - Initial weight loss goal of 5-10% weight loss for improved health  - Appetite has been increased  Weight loss medications discussed and she made an informed decision to start Wellbutrin  Side effects of Wellbutrin discussed  - She denies history of seizures or glaucoma  - Wellbutrin  mg daily started 9/15/2022 at weight of 189 lbs  - Labs reviewed: TSH and A1C 6/17/2022; lipid panel and CMP 4/5/2022  Chol and LDL elevated, which will likely improve with weight loss  Otherwise, labs within acceptable range  Fasting insulin 7/21/2022 was within normal range  Initial: 193 5 lbs  Current: 189 lbs  Change: -4 5 lbs  Goal: 140-150 lbs    Goals:  Get back to food logging  9761-1537 calories per day  Continue physical therapy and increase activity as tolerated - get back to walking when able  Keep up the great work with water intake  Start Wellbutrin       Mixed hyperlipidemia  - Will likely improve with weight loss and lifestyle modification  Ashland Fore was seen today for follow-up  Diagnoses and all orders for this visit:    Obesity, Class I, BMI 30-34 9  -     buPROPion (Wellbutrin XL) 150 mg 24 hr tablet; Take 1 tablet (150 mg total) by mouth every morning    Mixed hyperlipidemia  -     buPROPion (Wellbutrin XL) 150 mg 24 hr tablet; Take 1 tablet (150 mg total) by mouth every morning          Follow up in approximately 2 months with Non-Surgical Physician/Advanced Practitioner  Subjective:   Chief Complaint   Patient presents with    Follow-up     MWM 2 mth fu        Patient ID: Rena Covarrubias  is a 78 y o  female with excess weight/obesity here to pursue weight management  Patient is pursuing Conservative Program    Most recent notes and records were reviewed      HPI    Wt Readings from Last 10 Encounters:   09/15/22 85 7 kg (189 lb)   07/14/22 87 8 kg (193 lb 8 oz)   04/07/22 85 3 kg (188 lb)   03/21/22 87 3 kg (192 lb 6 oz)   11/30/21 87 2 kg (192 lb 3 2 oz)   10/29/21 85 kg (187 lb 8 oz)   08/11/21 86 4 kg (190 lb 6 4 oz)   10/22/20 83 3 kg (183 lb 9 6 oz)   11/12/19 86 7 kg (191 lb 3 2 oz)   06/06/19 85 7 kg (189 lb)     Was food logging, but not as much recently  Has been eating similarly  Was getting [phone] 13 calories per day  Feels that appetite has been increased  Has been under more stress related to work  B- protein shake with skim milk or water or 1 egg with 1/2 english muffin or waffle with SF syrup and fruit  S- none or fruit or yogurt   L- cheese with lettuce and tomato or grilled chicken with salad and FF dressing  S- GF pretzels   D- fish or hamburger no bun and salad or veggie sometimes corn on the cob  S- none     Hydration: at least 64 oz water, 2-3 cups hot tea per week occ with splenda and skim milk, 1 cup daily diet iced tea, 3 cans diet V8 juice weekly, unsweetened almond milk with protein shakes  Alcohol: 1 drink per month  Smoking: denies  Exercise:  In PT currently for back and hip pain - was previously walking, but exercise limited due to that    Occupation: has own business  Sleep: 5-6 hours     Colonoscopy: UTD, due 11/2028  Mammogram: UTD, due 12/2022        The following portions of the patient's history were reviewed and updated as appropriate: allergies, current medications, past family history, past medical history, past social history, past surgical history, and problem list     Family History   Problem Relation Age of Onset    Diabetes Mother     Hypertension Mother     Diabetes type II Mother         Type 2 diabetes mellitus    Hypertension Father     Heart disease Father         Myocardial ischemia    Abdominal aortic aneurysm Father     Heart defect Sister     Stroke Sister     Heart Valve Disease Sister         patent foramen ovale found     Clotting disorder Sister     Mental illness Sister     Hyperlipidemia Sister     Cancer Brother lung    Diabetes type II Brother         Type 2 diabetes mellitus    Hypertension Brother     Hyperlipidemia Brother     Heart disease Brother     Diabetes Brother     Thyroid disease Neg Hx         Review of Systems   HENT: Negative for sore throat  Respiratory: Negative for cough and shortness of breath  Cardiovascular: Negative for chest pain and palpitations  Gastrointestinal: Negative for abdominal pain, constipation, diarrhea, nausea and vomiting  Denies GERD   Musculoskeletal: Positive for arthralgias (hip) and back pain  Skin: Negative for rash  Psychiatric/Behavioral: Negative for suicidal ideas (or HI)  Denies depression and anxiety       Objective:  /80 (BP Location: Left arm, Patient Position: Sitting, Cuff Size: Standard)   Pulse 84   Resp 14   Ht 5' 3 5" (1 613 m)   Wt 85 7 kg (189 lb)   SpO2 99%   BMI 32 95 kg/m²     Physical Exam  Vitals and nursing note reviewed  Constitutional   General appearance: Abnormal   well developed and obese  Eyes No conjunctival injection  Ears, Nose, Mouth, and Throat Oral mucosa moist    Pulmonary   Respiratory effort: No increased work of breathing or signs of respiratory distress  Cardiovascular     Examination of extremities for edema and/or varicosities: Normal   no edema  Abdomen   Abdomen: Abnormal   The abdomen was obese      Musculoskeletal   Normal range of motion  Neurological   Gait and station: Normal     Psychiatric   Orientation to person, place and time: Normal     Affect: appropriate

## 2022-09-15 NOTE — PROGRESS NOTES
Daily Note     Today's date: 9/15/2022  Patient name: Delilah Mukherjee  : 1955  MRN: [id number]  Referring provider: Janelle Nickerson DO  Dx:   Encounter Diagnosis     ICD-10-CM    1  Chronic right-sided low back pain without sciatica  M54 50     G89 29    2  Acute right hip pain  M25 551                   Subjective:  Patient states that she feels good  Objective: See treatment diary below  Assessment:  Patient demonstrates hypomobility at L5-S1 on the right  She demonstrates improved pelvic alignment  Tolerated treatment well  Patient would benefit from continued PT  Plan: Continue per plan of care        Precautions:  N/A    Manuals 9/15            Long axis distraction AZ            UPAs L5-S1 R AZ            Piriformis release AZ            Neuro Re-Ed             Diaphragmaticbreathing 2'            TA isometrics 10x5s                                                   Ther Ex             Bridges 2x10 2s            Piriformis str sup 3x30s                         Standing hip flexor str step 3x30s                         Ther Activity                                       Gait Training                                       Modalities              10'

## 2022-09-20 ENCOUNTER — OFFICE VISIT (OUTPATIENT)
Dept: PHYSICAL THERAPY | Facility: MEDICAL CENTER | Age: 67
End: 2022-09-20
Payer: MEDICARE

## 2022-09-20 DIAGNOSIS — G89.29 CHRONIC RIGHT-SIDED LOW BACK PAIN WITHOUT SCIATICA: Primary | ICD-10-CM

## 2022-09-20 DIAGNOSIS — M54.50 CHRONIC RIGHT-SIDED LOW BACK PAIN WITHOUT SCIATICA: Primary | ICD-10-CM

## 2022-09-20 DIAGNOSIS — M25.551 ACUTE RIGHT HIP PAIN: ICD-10-CM

## 2022-09-20 PROCEDURE — 97140 MANUAL THERAPY 1/> REGIONS: CPT | Performed by: PHYSICAL THERAPIST

## 2022-09-20 PROCEDURE — 97110 THERAPEUTIC EXERCISES: CPT | Performed by: PHYSICAL THERAPIST

## 2022-09-20 NOTE — PROGRESS NOTES
Daily Note     Today's date: 2022  Patient name: Jeff Fuentes  : 1955  MRN: [id number]  Referring provider: Salma Crawford DO  Dx:   Encounter Diagnosis     ICD-10-CM    1  Chronic right-sided low back pain without sciatica  M54 50     G89 29    2  Acute right hip pain  M25 551                   Subjective:  Patient states that she is doing well  Objective: See treatment diary below  Assessment:  Patient demonstrates improvement in both the right hip and low back  She has a plan to continue c/ HEP and transition to the gym when d/c'd from PT  Tolerated treatment well  Patient would benefit from continued PT  Plan: Continue per plan of care        Precautions:  N/A    Manuals 9/15 9/20           Long axis distraction AZ AZ           UPAs L5-S1 R AZ AZ           Piriformis release AZ AZ           R hip mobs  AZ           Neuro Re-Ed             Diaphragmaticbreathing 2' 2'           TA isometrics 10x5s 30x5s                                                  Ther Ex             Hip add  20x5s           Hip abd             Bridges 2x10 2s 2x10 2s           Piriformis str sup 3x30s 3x30s           Hip flexor str EOT  3x30s           Standing hip flexor str step 3x30s 3x30s                        Ther Activity                                       Gait Training                                       Modalities              10' 10'

## 2022-09-23 ENCOUNTER — OFFICE VISIT (OUTPATIENT)
Dept: PHYSICAL THERAPY | Facility: MEDICAL CENTER | Age: 67
End: 2022-09-23
Payer: MEDICARE

## 2022-09-23 DIAGNOSIS — M54.50 CHRONIC RIGHT-SIDED LOW BACK PAIN WITHOUT SCIATICA: Primary | ICD-10-CM

## 2022-09-23 DIAGNOSIS — M25.551 ACUTE RIGHT HIP PAIN: ICD-10-CM

## 2022-09-23 DIAGNOSIS — G89.29 CHRONIC RIGHT-SIDED LOW BACK PAIN WITHOUT SCIATICA: Primary | ICD-10-CM

## 2022-09-23 PROCEDURE — 97140 MANUAL THERAPY 1/> REGIONS: CPT | Performed by: PHYSICAL THERAPIST

## 2022-09-23 PROCEDURE — 97110 THERAPEUTIC EXERCISES: CPT | Performed by: PHYSICAL THERAPIST

## 2022-09-27 ENCOUNTER — OFFICE VISIT (OUTPATIENT)
Dept: PHYSICAL THERAPY | Facility: MEDICAL CENTER | Age: 67
End: 2022-09-27
Payer: MEDICARE

## 2022-09-27 DIAGNOSIS — M25.551 ACUTE RIGHT HIP PAIN: ICD-10-CM

## 2022-09-27 DIAGNOSIS — M54.50 CHRONIC RIGHT-SIDED LOW BACK PAIN WITHOUT SCIATICA: Primary | ICD-10-CM

## 2022-09-27 DIAGNOSIS — G89.29 CHRONIC RIGHT-SIDED LOW BACK PAIN WITHOUT SCIATICA: Primary | ICD-10-CM

## 2022-09-27 PROCEDURE — 97110 THERAPEUTIC EXERCISES: CPT | Performed by: PHYSICAL THERAPIST

## 2022-09-27 PROCEDURE — 97140 MANUAL THERAPY 1/> REGIONS: CPT | Performed by: PHYSICAL THERAPIST

## 2022-09-29 ENCOUNTER — OFFICE VISIT (OUTPATIENT)
Dept: PHYSICAL THERAPY | Facility: MEDICAL CENTER | Age: 67
End: 2022-09-29
Payer: MEDICARE

## 2022-09-29 DIAGNOSIS — M25.551 ACUTE RIGHT HIP PAIN: ICD-10-CM

## 2022-09-29 DIAGNOSIS — M54.50 CHRONIC RIGHT-SIDED LOW BACK PAIN WITHOUT SCIATICA: Primary | ICD-10-CM

## 2022-09-29 DIAGNOSIS — G89.29 CHRONIC RIGHT-SIDED LOW BACK PAIN WITHOUT SCIATICA: Primary | ICD-10-CM

## 2022-09-29 PROCEDURE — 97140 MANUAL THERAPY 1/> REGIONS: CPT | Performed by: PHYSICAL THERAPIST

## 2022-09-29 PROCEDURE — 97110 THERAPEUTIC EXERCISES: CPT | Performed by: PHYSICAL THERAPIST

## 2022-09-29 NOTE — PROGRESS NOTES
Daily Note     Today's date: 2022  Patient name: Idania Masters  : 1955  MRN: [id number]  Referring provider: Yeni Cavanaugh DO  Dx:   Encounter Diagnosis     ICD-10-CM    1  Chronic right-sided low back pain without sciatica  M54 50     G89 29    2  Acute right hip pain  M25 551                   Subjective:  Patient states that she is doing well  Objective: See treatment diary below  Assessment: Tolerated treatment well  Patient would benefit from continued PT  Plan: Continue per plan of care        Precautions:  N/A    Manuals 9/15 9/20 9/23 9/27 9/29        Long axis distraction AZ AZ           UPAs L5-S1 R AZ AZ AZ AZ AZ        Piriformis release AZ AZ AZ AZ AZ        R hip mobs  AZ AZ AZ AZ        Neuro Re-Ed             Diaphragmaticbreathing 2' 2' 2' 2'  2'        TA isometrics 10x5s 30x5s 30x5s 30x5s 30x5s                                               Ther Ex             Hip add  20x5s 30x5s 30x5s 30x5s        Hip abd    30x5s blue 20x5s black        Bridges 2x10 2s 2x10 2s 2x10 5s 3x10 5s 3x12 5s        Piriformis str sup 3x30s 3x30s 3x30s 3x30s 3x30s        Hip flexor str EOT  3x30s 3x30s 3x30s 3x30s        Standing hip flexor str step 3x30s 3x30s 3x30s 3x30s 3x30s                     Ther Activity                                       Gait Training                                       Modalities              10' 10' 10' np

## 2022-10-04 ENCOUNTER — OFFICE VISIT (OUTPATIENT)
Dept: PHYSICAL THERAPY | Facility: MEDICAL CENTER | Age: 67
End: 2022-10-04
Payer: MEDICARE

## 2022-10-04 DIAGNOSIS — M54.50 CHRONIC RIGHT-SIDED LOW BACK PAIN WITHOUT SCIATICA: Primary | ICD-10-CM

## 2022-10-04 DIAGNOSIS — M25.551 ACUTE RIGHT HIP PAIN: ICD-10-CM

## 2022-10-04 DIAGNOSIS — G89.29 CHRONIC RIGHT-SIDED LOW BACK PAIN WITHOUT SCIATICA: Primary | ICD-10-CM

## 2022-10-04 PROCEDURE — 97140 MANUAL THERAPY 1/> REGIONS: CPT | Performed by: PHYSICAL THERAPIST

## 2022-10-04 PROCEDURE — 97110 THERAPEUTIC EXERCISES: CPT | Performed by: PHYSICAL THERAPIST

## 2022-10-04 NOTE — PROGRESS NOTES
Daily Note     Today's date: 10/4/2022  Patient name: Tino Roberts  : 1955  MRN: [id number]  Referring provider: Demetria Cespedes DO  Dx:   Encounter Diagnosis     ICD-10-CM    1  Chronic right-sided low back pain without sciatica  M54 50     G89 29    2  Acute right hip pain  M25 551                   Subjective:  Patient states that she is doing well  Objective: See treatment diary below  Assessment: Tolerated treatment well  Patient would benefit from continued PT  Plan: Continue per plan of care        Precautions:  N/A    Manuals 9/15 9/20 9/23 9/27 9/29 10/4       Long axis distraction AZ AZ           UPAs L5-S1 R AZ AZ AZ AZ AZ AZ       Piriformis release AZ AZ AZ AZ AZ AZ       R hip mobs  AZ AZ AZ AZ AZ       Neuro Re-Ed             Diaphragmaticbreathing 2' 2' 2' 2'  2' 2'       TA isometrics 10x5s 30x5s 30x5s 30x5s 30x5s 30x5s                                              Ther Ex             Hip add  20x5s 30x5s 30x5s 30x5s 30x5s       Hip abd    30x5s blue 20x5s black 30x5s black       Bridges 2x10 2s 2x10 2s 2x10 5s 3x10 5s 3x12 5s 3x15 5s       Piriformis str sup 3x30s 3x30s 3x30s 3x30s 3x30s 3x30s       Hip flexor str EOT  3x30s 3x30s 3x30s 3x30s 3x30s       Standing hip flexor str step 3x30s 3x30s 3x30s 3x30s 3x30s 3x30s                    Ther Activity                                       Gait Training                                       Modalities              10' 10' 10' np

## 2022-10-06 ENCOUNTER — OFFICE VISIT (OUTPATIENT)
Dept: PHYSICAL THERAPY | Facility: MEDICAL CENTER | Age: 67
End: 2022-10-06
Payer: MEDICARE

## 2022-10-06 DIAGNOSIS — G89.29 CHRONIC RIGHT-SIDED LOW BACK PAIN WITHOUT SCIATICA: Primary | ICD-10-CM

## 2022-10-06 DIAGNOSIS — M54.50 CHRONIC RIGHT-SIDED LOW BACK PAIN WITHOUT SCIATICA: Primary | ICD-10-CM

## 2022-10-06 DIAGNOSIS — M25.551 ACUTE RIGHT HIP PAIN: ICD-10-CM

## 2022-10-06 PROCEDURE — 97140 MANUAL THERAPY 1/> REGIONS: CPT | Performed by: PHYSICAL THERAPIST

## 2022-10-06 PROCEDURE — 97110 THERAPEUTIC EXERCISES: CPT | Performed by: PHYSICAL THERAPIST

## 2022-10-06 NOTE — PROGRESS NOTES
Daily Note     Today's date: 10/6/2022  Patient name: Layo Aiken  : 1955  MRN: [id number]  Referring provider: Ximena Chopra DO  Dx:   Encounter Diagnosis     ICD-10-CM    1  Chronic right-sided low back pain without sciatica  M54 50     G89 29    2  Acute right hip pain  M25 551                   Subjective:  Patient states that she feels great today  Objective: See treatment diary below  Assessment:  Layo Aiken has been compliant with attending PT and home exercise program since initial eval   Zoila Zavala  has made improvements in objective data since initial evalulation and has achieved all goals  Patient reports having returned to their prior level or function  Patient provided with updated Home Exercise Program, all questions answered, verbalized understanding and agreement to plan of care  Thus it was mutually decided to discontinue this episode of care and transition to Home Exercise Program     Plan: d/c to HEP       Precautions:  N/A    Manuals 9/15 9/20 9/23 9/27 9/29 10/4 10/6      Long axis distraction AZ AZ           UPAs L5-S1 R AZ AZ AZ AZ AZ AZ AZ      Piriformis release AZ AZ AZ AZ AZ AZ AZ      R hip mobs  AZ AZ AZ AZ AZ AZ      Neuro Re-Ed             Diaphragmaticbreathing 2' 2' 2' 2'  2' 2' 2'      TA isometrics 10x5s 30x5s 30x5s 30x5s 30x5s 30x5s 30x5s                                             Ther Ex             Hip add  20x5s 30x5s 30x5s 30x5s 30x5s 30x5s      Hip abd    30x5s blue 20x5s black 30x5s black 30x5s black      Bridges 2x10 2s 2x10 2s 2x10 5s 3x10 5s 3x12 5s 3x15 5s 3x15 5s      Piriformis str sup 3x30s 3x30s 3x30s 3x30s 3x30s 3x30s 3x30s      Hip flexor str EOT  3x30s 3x30s 3x30s 3x30s 3x30s 3x30s      Standing hip flexor str step 3x30s 3x30s 3x30s 3x30s 3x30s 3x30s 3x30s                   Ther Activity                                       Gait Training                                       Modalities              10' 10' 10' np

## 2022-10-12 PROBLEM — Z00.00 MEDICARE ANNUAL WELLNESS VISIT, INITIAL: Status: RESOLVED | Noted: 2020-10-22 | Resolved: 2022-10-12

## 2022-10-12 PROBLEM — Z13.6 SCREENING FOR CARDIOVASCULAR CONDITION: Status: RESOLVED | Noted: 2021-11-30 | Resolved: 2022-10-12

## 2022-11-09 ENCOUNTER — OFFICE VISIT (OUTPATIENT)
Dept: BARIATRICS | Facility: CLINIC | Age: 67
End: 2022-11-09

## 2022-11-09 VITALS
RESPIRATION RATE: 18 BRPM | HEART RATE: 86 BPM | BODY MASS INDEX: 32.35 KG/M2 | DIASTOLIC BLOOD PRESSURE: 82 MMHG | SYSTOLIC BLOOD PRESSURE: 124 MMHG | HEIGHT: 64 IN | WEIGHT: 189.5 LBS | OXYGEN SATURATION: 97 %

## 2022-11-09 DIAGNOSIS — E66.9 OBESITY, CLASS I, BMI 30-34.9: Primary | ICD-10-CM

## 2022-11-09 DIAGNOSIS — E78.2 MIXED HYPERLIPIDEMIA: ICD-10-CM

## 2022-11-09 RX ORDER — BUPROPION HYDROCHLORIDE 300 MG/1
300 TABLET ORAL DAILY
Qty: 30 TABLET | Refills: 2 | Status: SHIPPED | OUTPATIENT
Start: 2022-11-09

## 2022-11-09 RX ORDER — SENNOSIDES 8.6 MG
1300 CAPSULE ORAL EVERY 8 HOURS PRN
COMMUNITY

## 2022-11-09 NOTE — PROGRESS NOTES
Assessment/Plan:     Obesity, Class I, BMI 30-34 9  - Patient is pursuing Conservative Program  - Initial weight loss goal of 5-10% weight loss for improved health  - She denies history of seizures or glaucoma  - Wellbutrin  mg daily started 9/15/2022 at weight of 189 lbs  Tolerating well  Not stress eating as much since starting Wellbutrin, but has not noticed as much of a difference in appetite  - Will increase Wellbutrin XL to 300 mg daily  - Completed PT and has been working out at [facility]  Could be adding muscle mass, hence that could also be reason for not seeing weight loss on the scale  - If further weight loss does not happen, consider body comp and metabolic testing, as well as menu planning with dietician, as she would then consider partial meal replacement  Initial: 193 5 lbs  Current: 189 5 lbs  Change: -4 lbs  Goal: 140-150 lbs    Goals:  Get back to food logging  8389-2731 calories per day  Keep up the great work with gym workouts   Keep up the great work with water intake  Increase Wellbutrin     Mixed hyperlipidemia  - Will likely improve with weight loss and lifestyle modification  Shannan Salomon was seen today for follow-up  Diagnoses and all orders for this visit:    Obesity, Class I, BMI 30-34 9  -     buPROPion (Wellbutrin XL) 300 mg 24 hr tablet; Take 1 tablet (300 mg total) by mouth daily    Mixed hyperlipidemia          Follow up in approximately 2 months with Non-Surgical Physician/Advanced Practitioner  Subjective:   Chief Complaint   Patient presents with   • Follow-up     MWM 2 mth fu,        Patient ID: Edwardo Staley  is a 78 y o  female with excess weight/obesity here to pursue weight management  Patient is pursuing Conservative Program    Most recent notes and records were reviewed      HPI    Wt Readings from Last 10 Encounters:   11/09/22 86 kg (189 lb 8 oz)   09/15/22 85 7 kg (189 lb)   07/14/22 87 8 kg (193 lb 8 oz)   04/07/22 85 3 kg (188 lb) 03/21/22 87 3 kg (192 lb 6 oz)   11/30/21 87 2 kg (192 lb 3 2 oz)   10/29/21 85 kg (187 lb 8 oz)   08/11/21 86 4 kg (190 lb 6 4 oz)   10/22/20 83 3 kg (183 lb 9 6 oz)   11/12/19 86 7 kg (191 lb 3 2 oz)     Has been food logging intermittently  Getting around 9516-7892 calories per day  Taking Wellbutrin  mg daily  No negative side effects  Feels that she is not stress eating as much on Wellbutrin, but has not noticed a significant difference in appetite  B- protein shake with skim milk and fruit   S- none   L- salad with chicken or sandwich with soup   S- none  D- roasted chicken or salmon with veggies or light chicken noodle soup with crackers or honey nut cheerios with skim milk  S- none      Hydration: at least 64 oz water, 2-3 cups hot tea per week occ with splenda and skim milk, 1 cup daily diet iced tea, 3 cans diet V8 juice weekly, unsweetened almond milk with protein shakes  Alcohol: 1 drink per month if that  Smoking: denies  Exercise: Completed PT and now going to [employer] [facility] 4 days per week for 50 minutes does cardio and weight training   Also walking more in general      Occupation: has own business  Sleep: 5-6 hours     Colonoscopy: UTD, due 11/2028  Mammogram: UTD, due 12/2022           The following portions of the patient's history were reviewed and updated as appropriate: allergies, current medications, past family history, past medical history, past social history, past surgical history, and problem list     Family History   Problem Relation Age of Onset   • Diabetes Mother    • Hypertension Mother    • Diabetes type II Mother         Type 2 diabetes mellitus   • Hypertension Father    • Heart disease Father         Myocardial ischemia   • Abdominal aortic aneurysm Father    • Heart defect Sister    • Stroke Sister    • Heart Valve Disease Sister         patent foramen ovale found    • Clotting disorder Sister    • Mental illness Sister    • Hyperlipidemia Sister    • Cancer Brother         lung   • Diabetes type II Brother         Type 2 diabetes mellitus   • Hypertension Brother    • Hyperlipidemia Brother    • Heart disease Brother    • Diabetes Brother    • Thyroid disease Neg Hx         Review of Systems   HENT: Negative for sore throat  Respiratory: Negative for cough and shortness of breath  Cardiovascular: Negative for chest pain and palpitations  Gastrointestinal: Negative for abdominal pain, constipation, diarrhea, nausea and vomiting  Denies GERD   Musculoskeletal: Positive for arthralgias (arthritis) and back pain (chronic)  Skin: Negative for rash  Psychiatric/Behavioral: Negative for suicidal ideas (or HI)  Denies depression and anxiety       Objective:  /82   Pulse 86   Resp 18   Ht 5' 3 5" (1 613 m)   Wt 86 kg (189 lb 8 oz)   SpO2 97%   BMI 33 04 kg/m²     Physical Exam  Vitals and nursing note reviewed  Constitutional   General appearance: Abnormal   well developed and obese  Eyes No conjunctival injection  Ears, Nose, Mouth, and Throat Oral mucosa moist    Pulmonary   Respiratory effort: No increased work of breathing or signs of respiratory distress  Cardiovascular     Examination of extremities for edema and/or varicosities: Normal   no edema  Abdomen   Abdomen: Abnormal   The abdomen was obese      Musculoskeletal   Normal range of motion  Neurological   Gait and station: Normal     Psychiatric   Orientation to person, place and time: Normal     Affect: appropriate

## 2022-11-09 NOTE — ASSESSMENT & PLAN NOTE
- Patient is pursuing Conservative Program  - Initial weight loss goal of 5-10% weight loss for improved health  - She denies history of seizures or glaucoma  - Wellbutrin  mg daily started 9/15/2022 at weight of 189 lbs  Tolerating well  Not stress eating as much since starting Wellbutrin, but has not noticed as much of a difference in appetite  - Will increase Wellbutrin XL to 300 mg daily  - Completed PT and has been working out at [employer]  Could be adding muscle mass, hence that could also be reason for not seeing weight loss on the scale  - If further weight loss does not happen, consider body comp and metabolic testing, as well as menu planning with dietician, as she would then consider partial meal replacement  Initial: 193 5 lbs  Current: 189 5 lbs  Change: -4 lbs  Goal: 140-150 lbs    Goals:  Get back to food logging  2028-1316 calories per day  Keep up the great work with gym workouts   Keep up the great work with water intake     Increase Wellbutrin

## 2022-11-24 DIAGNOSIS — G25.81 RESTLESS LEG: ICD-10-CM

## 2022-11-25 RX ORDER — GABAPENTIN 100 MG/1
CAPSULE ORAL
Qty: 30 CAPSULE | Refills: 0 | Status: SHIPPED | OUTPATIENT
Start: 2022-11-25

## 2022-11-29 ENCOUNTER — RA CDI HCC (OUTPATIENT)
Dept: OTHER | Facility: HOSPITAL | Age: 67
End: 2022-11-29

## 2022-11-29 NOTE — PROGRESS NOTES
[address]  coding opportunities       Chart reviewed, no opportunity found: CHART REVIEWED, NO OPPORTUNITY FOUND        Patients Insurance     Medicare Insurance: Medicare

## 2022-12-01 DIAGNOSIS — M51.36 DISC DEGENERATION, LUMBAR: ICD-10-CM

## 2022-12-01 DIAGNOSIS — M54.41 ACUTE RIGHT-SIDED LOW BACK PAIN WITH RIGHT-SIDED SCIATICA: ICD-10-CM

## 2022-12-01 RX ORDER — NAPROXEN 500 MG/1
TABLET ORAL
Qty: 60 TABLET | Refills: 2 | Status: SHIPPED | OUTPATIENT
Start: 2022-12-01

## 2022-12-05 ENCOUNTER — APPOINTMENT (OUTPATIENT)
Dept: LAB | Facility: MEDICAL CENTER | Age: 67
End: 2022-12-05

## 2022-12-05 LAB
ALBUMIN SERPL BCP-MCNC: 3.2 G/DL (ref 3.5–5)
ALP SERPL-CCNC: 94 U/L (ref 46–116)
ALT SERPL W P-5'-P-CCNC: 33 U/L (ref 12–78)
ANION GAP SERPL CALCULATED.3IONS-SCNC: 5 MMOL/L (ref 4–13)
AST SERPL W P-5'-P-CCNC: 19 U/L (ref 5–45)
BILIRUB SERPL-MCNC: 0.36 MG/DL (ref 0.2–1)
BUN SERPL-MCNC: 16 MG/DL (ref 5–25)
CALCIUM ALBUM COR SERPL-MCNC: 9.5 MG/DL (ref 8.3–10.1)
CALCIUM SERPL-MCNC: 8.9 MG/DL (ref 8.3–10.1)
CHLORIDE SERPL-SCNC: 110 MMOL/L (ref 96–108)
CHOLEST SERPL-MCNC: 181 MG/DL
CO2 SERPL-SCNC: 24 MMOL/L (ref 21–32)
CREAT SERPL-MCNC: 0.95 MG/DL (ref 0.6–1.3)
GFR SERPL CREATININE-BSD FRML MDRD: 62 ML/MIN/1.73SQ M
GLUCOSE P FAST SERPL-MCNC: 106 MG/DL (ref 65–99)
HDLC SERPL-MCNC: 39 MG/DL
LDLC SERPL CALC-MCNC: 120 MG/DL (ref 0–100)
NONHDLC SERPL-MCNC: 142 MG/DL
POTASSIUM SERPL-SCNC: 3.8 MMOL/L (ref 3.5–5.3)
PROT SERPL-MCNC: 7.3 G/DL (ref 6.4–8.4)
SODIUM SERPL-SCNC: 139 MMOL/L (ref 135–147)
TRIGL SERPL-MCNC: 111 MG/DL

## 2022-12-06 ENCOUNTER — OFFICE VISIT (OUTPATIENT)
Dept: FAMILY MEDICINE CLINIC | Facility: CLINIC | Age: 67
End: 2022-12-06

## 2022-12-06 VITALS
TEMPERATURE: 97.9 F | SYSTOLIC BLOOD PRESSURE: 124 MMHG | WEIGHT: 185 LBS | DIASTOLIC BLOOD PRESSURE: 80 MMHG | BODY MASS INDEX: 31.58 KG/M2 | HEIGHT: 64 IN

## 2022-12-06 DIAGNOSIS — E78.2 MIXED HYPERLIPIDEMIA: ICD-10-CM

## 2022-12-06 DIAGNOSIS — C44.311 BASAL CELL CARCINOMA OF NOSE: ICD-10-CM

## 2022-12-06 DIAGNOSIS — E66.09 CLASS 1 OBESITY DUE TO EXCESS CALORIES WITH SERIOUS COMORBIDITY AND BODY MASS INDEX (BMI) OF 32.0 TO 32.9 IN ADULT: ICD-10-CM

## 2022-12-06 DIAGNOSIS — Z00.00 MEDICARE ANNUAL WELLNESS VISIT, SUBSEQUENT: Primary | ICD-10-CM

## 2022-12-06 DIAGNOSIS — R73.01 ELEVATED FASTING GLUCOSE: ICD-10-CM

## 2022-12-06 DIAGNOSIS — G25.81 RESTLESS LEG: ICD-10-CM

## 2022-12-06 DIAGNOSIS — E66.9 OBESITY, CLASS I, BMI 30-34.9: ICD-10-CM

## 2022-12-06 DIAGNOSIS — Z23 NEED FOR VACCINATION: ICD-10-CM

## 2022-12-06 NOTE — ASSESSMENT & PLAN NOTE
reviewed the ASCVD score with patient Patient is going to bariatrics  Patient did not tolerate the statin medication due to arthralgia and myalgia

## 2022-12-06 NOTE — PATIENT INSTRUCTIONS
Medicare Preventive Visit Patient Instructions  Thank you for completing your Welcome to Medicare Visit or Medicare Annual Wellness Visit today  Your next wellness visit will be due in one year (12/7/2023)  The screening/preventive services that you may require over the next 5-10 years are detailed below  Some tests may not apply to you based off risk factors and/or age  Screening tests ordered at today's visit but not completed yet may show as past due  Also, please note that scanned in results may not display below  Preventive Screenings:  Service Recommendations Previous Testing/Comments   Colorectal Cancer Screening  * Colonoscopy    * Fecal Occult Blood Test (FOBT)/Fecal Immunochemical Test (FIT)  * Fecal DNA/Cologuard Test  * Flexible Sigmoidoscopy Age: 39-70 years old   Colonoscopy: every 10 years (may be performed more frequently if at higher risk)  OR  FOBT/FIT: every 1 year  OR  Cologuard: every 3 years  OR  Sigmoidoscopy: every 5 years  Screening may be recommended earlier than age 39 if at higher risk for colorectal cancer  Also, an individualized decision between you and your healthcare provider will decide whether screening between the ages of 74-80 would be appropriate  Colonoscopy: 11/29/2018  FOBT/FIT: Not on file  Cologuard: Not on file  Sigmoidoscopy: Not on file    Screening Current     Breast Cancer Screening Age: 36 years old  Frequency: every 1-2 years  Not required if history of left and right mastectomy Mammogram: 12/07/2021    Screening Current   Cervical Cancer Screening Between the ages of 21-29, pap smear recommended once every 3 years  Between the ages of 33-67, can perform pap smear with HPV co-testing every 5 years     Recommendations may differ for women with a history of total hysterectomy, cervical cancer, or abnormal pap smears in past  Pap Smear: 11/28/2018    Screening Not Indicated   Hepatitis C Screening Once for adults born between 1945 and 1965  More frequently in patients at high risk for Hepatitis C Hep C Antibody: 10/23/2018    Screening Current   Diabetes Screening 1-2 times per year if you're at risk for diabetes or have pre-diabetes Fasting glucose: 106 mg/dL (12/5/2022)  A1C: 5 3 % (6/17/2022)  Screening Current   Cholesterol Screening Once every 5 years if you don't have a lipid disorder  May order more often based on risk factors  Lipid panel: 12/05/2022    Screening Not Indicated  History Lipid Disorder     Other Preventive Screenings Covered by Medicare:  1  Abdominal Aortic Aneurysm (AAA) Screening: covered once if your at risk  You're considered to be at risk if you have a family history of AAA  2  Lung Cancer Screening: covers low dose CT scan once per year if you meet all of the following conditions: (1) Age 50-69; (2) No signs or symptoms of lung cancer; (3) Current smoker or have quit smoking within the last 15 years; (4) You have a tobacco smoking history of at least 20 pack years (packs per day multiplied by number of years you smoked); (5) You get a written order from a healthcare provider  3  Glaucoma Screening: covered annually if you're considered high risk: (1) You have diabetes OR (2) Family history of glaucoma OR (3)  aged 48 and older OR (3)  American aged 72 and older  3  Osteoporosis Screening: covered every 2 years if you meet one of the following conditions: (1) You're estrogen deficient and at risk for osteoporosis based off medical history and other findings; (2) Have a vertebral abnormality; (3) On glucocorticoid therapy for more than 3 months; (4) Have primary hyperparathyroidism; (5) On osteoporosis medications and need to assess response to drug therapy  · Last bone density test (DXA Scan): 02/15/2022   5  HIV Screening: covered annually if you're between the age of 15-65  Also covered annually if you are younger than 13 and older than 72 with risk factors for HIV infection   For pregnant patients, it is covered up to 3 times per pregnancy  Immunizations:  Immunization Recommendations   Influenza Vaccine Annual influenza vaccination during flu season is recommended for all persons aged >= 6 months who do not have contraindications   Pneumococcal Vaccine   * Pneumococcal conjugate vaccine = PCV13 (Prevnar 13), PCV15 (Vaxneuvance), PCV20 (Prevnar 20)  * Pneumococcal polysaccharide vaccine = PPSV23 (Pneumovax) Adults 25-60 years old: 1-3 doses may be recommended based on certain risk factors  Adults 72 years old: 1-2 doses may be recommended based off what pneumonia vaccine you previously received   Hepatitis B Vaccine 3 dose series if at intermediate or high risk (ex: diabetes, end stage renal disease, liver disease)   Tetanus (Td) Vaccine - COST NOT COVERED BY MEDICARE PART B Following completion of primary series, a booster dose should be given every 10 years to maintain immunity against tetanus  Td may also be given as tetanus wound prophylaxis  Tdap Vaccine - COST NOT COVERED BY MEDICARE PART B Recommended at least once for all adults  For pregnant patients, recommended with each pregnancy  Shingles Vaccine (Shingrix) - COST NOT COVERED BY MEDICARE PART B  2 shot series recommended in those aged 48 and above     Health Maintenance Due:      Topic Date Due   • Breast Cancer Screening: Mammogram  12/07/2022   • Colorectal Cancer Screening  11/29/2028   • Hepatitis C Screening  Completed     Immunizations Due:      Topic Date Due   • Hepatitis B Vaccine (1 of 3 - 3-dose series) Never done   • COVID-19 Vaccine (3 - Booster for Moderna series) 10/12/2021   • Influenza Vaccine (1) 09/01/2022     Advance Directives   What are advance directives? Advance directives are legal documents that state your wishes and plans for medical care  These plans are made ahead of time in case you lose your ability to make decisions for yourself   Advance directives can apply to any medical decision, such as the treatments you want, and if you want to donate organs  What are the types of advance directives? There are many types of advance directives, and each state has rules about how to use them  You may choose a combination of any of the following:  · Living will: This is a written record of the treatment you want  You can also choose which treatments you do not want, which to limit, and which to stop at a certain time  This includes surgery, medicine, IV fluid, and tube feedings  · Durable power of  for healthcare [facility] [place]): This is a written record that states who you want to make healthcare choices for you when you are unable to make them for yourself  This person, called a proxy, is usually a family member or a friend  You may choose more than 1 proxy  · Do not resuscitate (DNR) order:  A DNR order is used in case your heart stops beating or you stop breathing  It is a request not to have certain forms of treatment, such as CPR  A DNR order may be included in other types of advance directives  · Medical directive: This covers the care that you want if you are in a coma, near death, or unable to make decisions for yourself  You can list the treatments you want for each condition  Treatment may include pain medicine, surgery, blood transfusions, dialysis, IV or tube feedings, and a ventilator (breathing machine)  · Values history: This document has questions about your views, beliefs, and how you feel and think about life  This information can help others choose the care that you would choose  Why are advance directives important? An advance directive helps you control your care  Although spoken wishes may be used, it is better to have your wishes written down  Spoken wishes can be misunderstood, or not followed  Treatments may be given even if you do not want them  An advance directive may make it easier for your family to make difficult choices about your care     Weight Management   Why it is important to manage your weight:  Being overweight increases your risk of health conditions such as heart disease, high blood pressure, type 2 diabetes, and certain types of cancer  It can also increase your risk for osteoarthritis, sleep apnea, and other respiratory problems  Aim for a slow, steady weight loss  Even a small amount of weight loss can lower your risk of health problems  How to lose weight safely:  A safe and healthy way to lose weight is to eat fewer calories and get regular exercise  You can lose up about 1 pound a week by decreasing the number of calories you eat by 500 calories each day  Healthy meal plan for weight management:  A healthy meal plan includes a variety of foods, contains fewer calories, and helps you stay healthy  A healthy meal plan includes the following:  · Eat whole-grain foods more often  A healthy meal plan should contain fiber  Fiber is the part of grains, fruits, and vegetables that is not broken down by your body  Whole-grain foods are healthy and provide extra fiber in your diet  Some examples of whole-grain foods are whole-wheat breads and pastas, oatmeal, brown rice, and bulgur  · Eat a variety of vegetables every day  Include dark, leafy greens such as spinach, kale, jose j greens, and mustard greens  Eat yellow and orange vegetables such as carrots, sweet potatoes, and winter squash  · Eat a variety of fruits every day  Choose fresh or canned fruit (canned in its own juice or light syrup) instead of juice  Fruit juice has very little or no fiber  · Eat low-fat dairy foods  Drink fat-free (skim) milk or 1% milk  Eat fat-free yogurt and low-fat cottage cheese  Try low-fat cheeses such as mozzarella and other reduced-fat cheeses  · Choose meat and other protein foods that are low in fat  Choose beans or other legumes such as split peas or lentils  Choose fish, skinless poultry (chicken or turkey), or lean cuts of red meat (beef or pork)   Before you cook meat or poultry, cut off any visible fat  · Use less fat and oil  Try baking foods instead of frying them  Add less fat, such as margarine, sour cream, regular salad dressing and mayonnaise to foods  Eat fewer high-fat foods  Some examples of high-fat foods include french fries, doughnuts, ice cream, and cakes  · Eat fewer sweets  Limit foods and drinks that are high in sugar  This includes candy, cookies, regular soda, and sweetened drinks  Exercise:  Exercise at least 30 minutes per day on most days of the week  Some examples of exercise include walking, biking, dancing, and swimming  You can also fit in more physical activity by taking the stairs instead of the elevator or parking farther away from stores  Ask your healthcare provider about the best exercise plan for you  © Copyright [employer] 2018 Information is for End User's use only and may not be sold, redistributed or otherwise used for commercial purposes   All illustrations and images included in CareNotes® are the copyrighted property of [employer]  or [address] [place] [address]

## 2022-12-06 NOTE — PROGRESS NOTES
Assessment and Plan:     Problem List Items Addressed This Visit        Musculoskeletal and Integument    Basal cell carcinoma of nose     followup with dermatology            Other    Mixed hyperlipidemia     reviewed the ASCVD score with patient Patient is going to bariatrics  Patient did not tolerate the statin medication due to arthralgia and myalgia          Relevant Orders    Comprehensive metabolic panel    Lipid panel    Class 1 obesity due to excess calories with serious comorbidity and body mass index (BMI) of 32 0 to 32 9 in adult     Weight is down 3 pounds continue with bariatric follow up         Relevant Orders    Hemoglobin A1c (w/out EAG)    Restless leg     Continue gabapentin         Obesity, Class I, BMI 30-34 9     Weight is stable She is seeing bariatrics and will continue to do so         Medicare annual wellness visit, subsequent - Primary     Mammogram is scheduled Patient to get copy of living will for the chart Flu shot given        Other Visit Diagnoses     Elevated fasting glucose        Relevant Orders    Comprehensive metabolic panel    Hemoglobin A1c (w/out EAG)    Need for vaccination        Relevant Orders    influenza vaccine, high-dose, PF 0 7 mL (FLUZONE HIGH-DOSE) (Completed)           Preventive health issues were discussed with patient, and age appropriate screening tests were ordered as noted in patient's After Visit Summary  Personalized health advice and appropriate referrals for health education or preventive services given if needed, as noted in patient's After Visit Summary       History of Present Illness:     Patient presents for a Medicare Wellness Visit    Patient is here for medicare wellness and followup of basal cell carcinoma obesity hyperlipidemia and restless leg Patient has mammogram scheduled 1/10/2022 Patient is seeing dermatology every 6 months Patient did not tolerate statins due to pain issues in joints and muscles She is seeing bariatrics and lost 3 pounds in last 6 months Patient needs flu shot also Patient has frustration about her weight She had the wellbutrin increased Patient leg pain is much improved with gabapentin and she is sleeping well     Patient Care Team:  Pauline Ward DO as PCP - General     Review of Systems:     Review of Systems   Constitutional: Negative for fatigue, fever and unexpected weight change  HENT: Negative for congestion, sinus pain and trouble swallowing  Eyes: Negative for discharge and visual disturbance  Respiratory: Negative for cough, chest tightness, shortness of breath and wheezing  Cardiovascular: Negative for chest pain, palpitations and leg swelling  Gastrointestinal: Negative for abdominal pain, blood in stool, constipation, diarrhea, nausea and vomiting  Genitourinary: Negative for difficulty urinating, dysuria, frequency and hematuria  Musculoskeletal: Positive for arthralgias and back pain  Negative for gait problem and joint swelling  Stable arthritis pain in back and hips   Skin: Negative for rash and wound  Allergic/Immunologic: Negative for environmental allergies and food allergies  Neurological: Negative for dizziness, syncope, weakness, numbness and headaches  Hematological: Negative for adenopathy  Does not bruise/bleed easily  Psychiatric/Behavioral: Negative for confusion, decreased concentration and sleep disturbance  The patient is not nervous/anxious           Problem List:     Patient Active Problem List   Diagnosis   • Basal cell carcinoma of nose   • Disc degeneration, lumbar   • Mixed hyperlipidemia   • Class 1 obesity due to excess calories with serious comorbidity and body mass index (BMI) of 32 0 to 32 9 in adult   • Achilles tendon disorder, left   • Restless leg   • Asymptomatic postmenopausal state   • Family history of abdominal aortic aneurysm (AAA)   • Obesity, Class I, BMI 30-34 9   • Medicare annual wellness visit, subsequent      Past Medical and Surgical History:     Past Medical History:   Diagnosis Date   • Achilles tendinitis of right lower extremity     last assessed - 01Jun2016   • Generalized anxiety disorder 6/6/2019   • Hyperlipidemia     last assessed - 29Aug2016   • Sciatica, left side     last assessed - 22Apr2014     Past Surgical History:   Procedure Laterality Date   • COLONOSCOPY      Complete colonoscopy - nml; resolved - [id number]      Family History:     Family History   Problem Relation Age of Onset   • Diabetes Mother    • Hypertension Mother    • Diabetes type II Mother         Type 2 diabetes mellitus   • Hypertension Father    • Heart disease Father         Myocardial ischemia   • Abdominal aortic aneurysm Father    • Heart defect Sister    • Stroke Sister    • Heart Valve Disease Sister         patent foramen ovale found    • Clotting disorder Sister    • Mental illness Sister    • Hyperlipidemia Sister    • Cancer Brother         lung   • Diabetes type II Brother         Type 2 diabetes mellitus   • Hypertension Brother    • Hyperlipidemia Brother    • Heart disease Brother    • Diabetes Brother    • Thyroid disease Neg Hx       Social History:     Social History     Socioeconomic History   • Marital status: Single     Spouse name: None   • Number of children: None   • Years of education: None   • Highest education level: None   Occupational History   • None   Tobacco Use   • Smoking status: Never   • Smokeless tobacco: Never   Vaping Use   • Vaping Use: Never used   Substance and Sexual Activity   • Alcohol use: No   • Drug use: No   • Sexual activity: Not Currently     Partners: Male   Other Topics Concern   • None   Social History Narrative   • None     Social Determinants of Health     Financial Resource Strain: Low Risk    • Difficulty of Paying Living Expenses: Not hard at all   Food Insecurity: Not on file   Transportation Needs: No Transportation Needs   • Lack of Transportation (Medical):  No   • Lack of Transportation (Non-Medical): No   Physical Activity: Not on file   Stress: Not on file   Social Connections: Not on file   Intimate Partner Violence: Not on file   Housing Stability: Not on file      Medications and Allergies:     Current Outpatient Medications   Medication Sig Dispense Refill   • acetaminophen (TYLENOL) 650 mg CR tablet Take 1,300 mg by mouth every 8 (eight) hours as needed for mild pain     • buPROPion (Wellbutrin XL) 300 mg 24 hr tablet Take 1 tablet (300 mg total) by mouth daily 30 tablet 2   • calcium carbonate (OS-STELLA) 600 MG tablet Take 600 mg by mouth 2 (two) times a day with meals PRN     • gabapentin (NEURONTIN) 100 mg capsule TAKE 1 CAPSULE BY MOUTH EVERYDAY AT BEDTIME 30 capsule 0   • naproxen (NAPROSYN) 500 mg tablet TAKE 1 TABLET BY MOUTH TWICE DAILY WITH FOOD FOR 10 DAYS THEN AS NEEDED 60 tablet 2     No current facility-administered medications for this visit  Allergies   Allergen Reactions   • Statins Myalgia      Immunizations:     Immunization History   Administered Date(s) Administered   • COVID-19 MODERNA VACC 0 5 ML IM 04/12/2021, 05/12/2021   • INFLUENZA 11/12/2019, 10/22/2020   • Influenza Quadrivalent, 6-35 Months IM 12/14/2016, 10/03/2017   • Influenza, high dose seasonal 0 7 mL 10/29/2021, 12/06/2022   • Influenza, recombinant, quadrivalent,injectable, preservative free 10/30/2018, 11/12/2019, 10/22/2020   • Pneumococcal Conjugate 13-Valent 12/14/2016   • Pneumococcal Polysaccharide PPV23 10/29/2021   • Tdap 11/27/2017   • Zoster 11/27/2017      Health Maintenance:         Topic Date Due   • Breast Cancer Screening: Mammogram  12/07/2022   • Colorectal Cancer Screening  11/29/2028   • Hepatitis C Screening  Completed         Topic Date Due   • Hepatitis B Vaccine (1 of 3 - 3-dose series) Never done   • COVID-19 Vaccine (3 - Booster for Charna Paget series) 10/12/2021      Medicare Screening Tests and Risk Assessments:     [employer]er is here for her Subsequent Wellness visit       Health Risk Assessment:   Patient rates overall health as very good  Patient feels that their physical health rating is slightly worse  Patient is very satisfied with their life  Eyesight was rated as same  Hearing was rated as same  Patient feels that their emotional and mental health rating is same  Patients states they are never, rarely angry  Patient states they are sometimes unusually tired/fatigued  Pain experienced in the last 7 days has been none  Patient states that she has experienced no weight loss or gain in last 6 months  Patient has some arthritis issues  She I upset with not losing weight she is seeing bariatric    Depression Screening:   PHQ-2 Score: 0      Fall Risk Screening: In the past year, patient has experienced: no history of falling in past year      Urinary Incontinence Screening:   Patient has not leaked urine accidently in the last six months  Home Safety:  Patient does not have trouble with stairs inside or outside of their home  Patient has working smoke alarms and has working carbon monoxide detector  Home safety hazards include: none  Nutrition:   Current diet is Regular  Medications:   Patient is currently taking over-the-counter supplements  OTC medications include: see medication list  Patient is able to manage medications  Activities of Daily Living (ADLs)/Instrumental Activities of Daily Living (IADLs):   Walk and transfer into and out of bed and chair?: Yes  Dress and groom yourself?: Yes    Bathe or shower yourself?: Yes    Feed yourself? Yes  Do your laundry/housekeeping?: Yes  Manage your money, pay your bills and track your expenses?: Yes  Make your own meals?: Yes    Do your own shopping?: Yes    Previous Hospitalizations:   Any hospitalizations or ED visits within the last 12 months?: No      Advance Care Planning:   Living will: Yes    Durable POA for healthcare:  Yes    Advanced directive: Yes    Provider agrees with end of life decisions: Yes      Comments: Patient is full code and does not want to be kept alive on life support Patient will get copy of living will Patient daughter is POA at this time    Cognitive Screening:   Provider or family/friend/caregiver concerned regarding cognition?: No    PREVENTIVE SCREENINGS      Cardiovascular Screening:    General: Screening Not Indicated and History Lipid Disorder      Diabetes Screening:     General: Screening Current      Colorectal Cancer Screening:     General: Screening Current      Breast Cancer Screening:     General: Screening Current      Cervical Cancer Screening:    General: Screening Not Indicated      Abdominal Aortic Aneurysm (AAA) Screening:    Risk factors include: family history of AAA        Lung Cancer Screening:     General: Screening Not Indicated      Hepatitis C Screening:    General: Screening Current    Screening, Brief Intervention, and Referral to Treatment (SBIRT)    Screening  Typical number of drinks in a day: 0  Typical number of drinks in a week: 0  Interpretation: Low risk drinking behavior  Single Item Drug Screening:  How often have you used an illegal drug (including marijuana) or a prescription medication for non-medical reasons in the past year? never    Single Item Drug Screen Score: 0  Interpretation: Negative screen for possible drug use disorder    No results found  Physical Exam:     /80   Temp 97 9 °F (36 6 °C)   Ht 5' 3 5" (1 613 m)   Wt 83 9 kg (185 lb)   BMI 32 26 kg/m²     Physical Exam  Vitals and nursing note reviewed  Constitutional:       Appearance: She is well-developed and well-nourished  She is obese  HENT:      Head: Normocephalic and atraumatic  Right Ear: External ear normal       Left Ear: External ear normal       Nose: Nose normal       Mouth/Throat:      Pharynx: No oropharyngeal exudate     Eyes:      Extraocular Movements: Extraocular movements intact and EOM normal       Conjunctiva/sclera: Conjunctivae normal       Pupils: Pupils are equal, round, and reactive to light  Neck:      Thyroid: No thyromegaly  Trachea: No tracheal deviation  Cardiovascular:      Rate and Rhythm: Normal rate and regular rhythm  Pulses: Intact distal pulses  Heart sounds: Normal heart sounds  Pulmonary:      Effort: Pulmonary effort is normal  No respiratory distress  Breath sounds: Normal breath sounds  No wheezing or rales  Abdominal:      General: Bowel sounds are normal       Palpations: Abdomen is soft  Musculoskeletal:         General: Normal range of motion  Cervical back: Normal range of motion and neck supple  Lymphadenopathy:      Cervical: No cervical adenopathy  Skin:     General: Skin is warm  Findings: No rash  Neurological:      General: No focal deficit present  Mental Status: She is alert and oriented to person, place, and time  Cranial Nerves: No cranial nerve deficit  Motor: No abnormal muscle tone  Psychiatric:         Mood and Affect: Mood and affect and mood normal          Behavior: Behavior normal          Thought Content:  Thought content normal          Judgment: Judgment normal           Theotis Cure, DO

## 2022-12-07 DIAGNOSIS — J01.90 ACUTE NON-RECURRENT SINUSITIS, UNSPECIFIED LOCATION: Primary | ICD-10-CM

## 2022-12-07 DIAGNOSIS — J01.90 ACUTE NON-RECURRENT SINUSITIS, UNSPECIFIED LOCATION: ICD-10-CM

## 2022-12-07 RX ORDER — AMOXICILLIN 875 MG/1
875 TABLET, COATED ORAL 2 TIMES DAILY
Qty: 20 TABLET | Refills: 0 | Status: SHIPPED | OUTPATIENT
Start: 2022-12-07 | End: 2022-12-07 | Stop reason: SDUPTHER

## 2022-12-07 RX ORDER — AMOXICILLIN 875 MG/1
875 TABLET, COATED ORAL 2 TIMES DAILY
Qty: 20 TABLET | Refills: 0 | Status: SHIPPED | OUTPATIENT
Start: 2022-12-07 | End: 2022-12-17

## 2022-12-19 ENCOUNTER — TELEMEDICINE (OUTPATIENT)
Dept: FAMILY MEDICINE CLINIC | Facility: CLINIC | Age: 67
End: 2022-12-19

## 2022-12-19 DIAGNOSIS — E66.09 CLASS 1 OBESITY DUE TO EXCESS CALORIES WITH SERIOUS COMORBIDITY AND BODY MASS INDEX (BMI) OF 32.0 TO 32.9 IN ADULT: ICD-10-CM

## 2022-12-19 DIAGNOSIS — U07.1 COVID-19: Primary | ICD-10-CM

## 2022-12-19 RX ORDER — NIRMATRELVIR AND RITONAVIR 300-100 MG
3 KIT ORAL 2 TIMES DAILY
Qty: 30 TABLET | Refills: 0 | Status: SHIPPED | OUTPATIENT
Start: 2022-12-19 | End: 2022-12-24

## 2022-12-19 NOTE — PROGRESS NOTES
COVID-19 Outpatient Progress Note    Assessment/Plan:    Problem List Items Addressed This Visit    None     Disposition:     Discussed symptom directed medication options with patient  Discussed vitamin D, vitamin C, and/or zinc supplementation with patient  Patient to quarantine from now through Friday then must mask for the next 5 days Patient will conitnue fluid rest and tylenol also    Patient meets criteria for PAXLOVID and they have been counseled appropriately according to EUA documentation released by the FDA  After discussion, patient agrees to treatment  Annette Landerosings is an investigational medicine used to treat mild-to-moderate COVID-19 in adults and children (15years of age and older weighing at least 80 pounds (40 kg)) with positive results of direct SARS-CoV-2 viral testing, and who are at high risk for progression to severe COVID-19, including hospitalization or death  PAXLOVID is investigational because it is still being studied  There is limited information about the safety and effectiveness of using PAXLOVID to treat people with mild-to-moderate COVID-19  The FDA has authorized the emergency use of PAXLOVID for the treatment of mild-tomoderate COVID-19 in adults and children (15years of age and older weighing at least 80 pounds (40 kg)) with a positive test for the virus that causes COVID-19, and who are at high risk for progression to severe COVID-19, including hospitalization or death, under an EUA  What should I tell my healthcare provider before I take PAXLOVID? Tell your healthcare provider if you:  - Have any allergies  - Have liver or kidney disease  - Are pregnant or plan to become pregnant  - Are breastfeeding a child  - Have any serious illnesses    Tell your healthcare provider about all the medicines you take, including prescription and over-the-counter medicines, vitamins, and herbal supplements   Some medicines may interact with PAXLOVID and may cause serious side effects  Keep a list of your medicines to show your healthcare provider and pharmacist when you get a new medicine  You can ask your healthcare provider or pharmacist for a list of medicines that interact with PAXLOVID  Do not start taking a new medicine without telling your healthcare provider  Your healthcare provider can tell you if it is safe to take PAXLOVID with other medicines  Tell your healthcare provider if you are taking combined hormonal contraceptive  PAXLOVID may affect how your birth control pills work  Females who are able to become pregnant should use another effective alternative form of contraception or an additional barrier method of contraception  Talk to your healthcare provider if you have any questions about contraceptive methods that might be right for you  How do I take PAXLOVID? PAXLOVID consists of 2 medicines: nirmatrelvir and ritonavir  - Take 2 pink tablets of nirmatrelvir with 1 white tablet of ritonavir by mouth 2 times each day (in the morning and in the evening) for 5 days  For each dose, take all 3 tablets at the same time  - If you have kidney disease, talk to your healthcare provider  You may need a different dose  - Swallow the tablets whole  Do not chew, break, or crush the tablets  - Take PAXLOVID with or without food  - Do not stop taking PAXLOVID without talking to your healthcare provider, even if you feel better  - If you miss a dose of PAXLOVID within 8 hours of the time it is usually taken, take it as soon as you remember  If you miss a dose by more than 8 hours, skip the missed dose and take the next dose at your regular time  Do not take 2 doses of PAXLOVID at the same time  - If you take too much PAXLOVID, call your healthcare provider or go to the nearest hospital emergency room right away    - If you are taking a ritonavir- or cobicistat-containing medicine to treat hepatitis C or Human Immunodeficiency Virus (HIV), you should continue to take your medicine as prescribed by your healthcare provider   - Talk to your healthcare provider if you do not feel better or if you feel worse after 5 days  Who should generally not take PAXLOVID? Do not take PAXLOVID if:  You are allergic to nirmatrelvir, ritonavir, or any of the ingredients in PAXLOVID  You are taking any of the following medicines:  - Alfuzosin  - Pethidine, piroxicam, propoxyphene  - Ranolazine  - Amiodarone, dronedarone, flecainide, propafenone, quinidine  - Colchicine  - Lurasidone, pimozide, clozapine  - Dihydroergotamine, ergotamine, methylergonovine  - Lovastatin, simvastatin  - Sildenafil (Revatio®) for pulmonary arterial hypertension (PAH)  - Triazolam, oral midazolam  - Apalutamide  - Carbamazepine, phenobarbital, phenytoin  - Rifampin  - St  Oswaldo’s Wort (hypericum perforatum)    What are the important possible side effects of PAXLOVID? Possible side effects of PAXLOVID are:  - Liver Problems  Tell your healthcare provider right away if you have any of these signs and symptoms of liver problems: loss of appetite, yellowing of your skin and the whites of eyes (jaundice), dark-colored urine, pale colored stools and itchy skin, stomach area (abdominal) pain  - Resistance to HIV Medicines  If you have untreated HIV infection, PAXLOVID may lead to some HIV medicines not working as well in the future  - Other possible side effects include: altered sense of taste, diarrhea, high blood pressure, or muscle aches    These are not all the possible side effects of PAXLOVID  Not many people have taken PAXLOVID  Serious and unexpected side effects may happen  [address] is still being studied, so it is possible that all of the risks are not known at this time  What other treatment choices are there? Like Aretta Loge may allow for the emergency use of other medicines to treat people with COVID-19   Go to https://Navmii/ for information on the emergency use of other medicines that are authorized by FDA to treat people with COVID-19  Your healthcare provider may talk with you about clinical trials for which you may be eligible  It is your choice to be treated or not to be treated with PAXLOVID  Should you decide not to receive it or for your child not to receive it, it will not change your standard medical care  What if I am pregnant or breastfeeding? There is no experience treating pregnant women or breastfeeding mothers with PAXLOVID  For a mother and unborn baby, the benefit of taking PAXLOVID may be greater than the risk from the treatment  If you are pregnant, discuss your options and specific situation with your healthcare provider  It is recommended that you use effective barrier contraception or do not have sexual activity while taking PAXLOVID  If you are breastfeeding, discuss your options and specific situation with your healthcare provider  How do I report side effects with PAXLOVID? Contact your healthcare provider if you have any side effects that bother you or do not go away  Report side effects to FDA MedWatch at www fda gov/medwatch or call 0-907-SET5571 or you can report side effects to [employer]  at the contact information provided below  Website Fax number Telephone number   [employer] [phone]     How should I store Cleotha Alea? Store PAXLOVID tablets at room temperature between 68°F to 77°F (20°C to 25°C)  Full fact sheet for patients, parents, and caregivers can be found at: [address]    I have spent 7 minutes directly with the patient   Greater than 50% of this time was spent in counseling/coordination of care regarding: diagnostic results, prognosis, risks and benefits of treatment options, instructions for management, patient and family education, risk factor reductions and impressions  Encounter provider: Rudy Grant DO     Provider located at: [address] [facility]  [address] [place] [address] [address]  [place] [address] [address] [id number] [phone]     Recent Visits  No visits were found meeting these conditions  Showing recent visits within past 7 days and meeting all other requirements  Future Appointments  No visits were found meeting these conditions  Showing future appointments within next 150 days and meeting all other requirements     This virtual check-in was done via [employer] and patient was informed that this is a secure, HIPAA-compliant platform  She agrees to proceed  Patient agrees to participate in a virtual check in via telephone or video visit instead of presenting to the office to address urgent/immediate medical needs  Patient is aware this is a billable service  She acknowledged consent and understanding of privacy and security of the video platform  The patient has agreed to participate and understands they can discontinue the visit at any time  After connecting through [facility], the patient was identified by name and date of birth  Valerio Andrews was informed that this was a telemedicine visit and that the exam was being conducted confidentially over secure lines  My office door was closed  No one else was in the room  Valerio Andrews acknowledged consent and understanding of privacy and security of the telemedicine visit  I informed the patient that I have reviewed her record in Epic and presented the opportunity for her to ask any questions regarding the visit today  The patient agreed to participate  Verification of patient location:  Patient is located in the following state in which I hold an active license: [place]    Subjective:   Valerio Andrews is a 78 y o  female who is concerned about COVID-19   Patient's symptoms include fever, chills, nasal congestion, rhinorrhea, sore throat, cough and headache  Patient denies fatigue, malaise, anosmia, loss of taste, shortness of breath, chest tightness, abdominal pain, nausea, vomiting, diarrhea and myalgias  - Date of symptom onset: 12/18/2022      COVID-19 vaccination status: Fully vaccinated (primary series)    Exposure:   Contact with a person who is under investigation (PUI) for or who is positive for COVID-19 within the last 14 days?: No    Hospitalized recently for fever and/or lower respiratory symptoms?: No      Currently a healthcare worker that is involved in direct patient care?: No      Works in a special setting where the risk of COVID-19 transmission may be high? (this may include long-term care, correctional and intermediate facilities; homeless shelters; assisted-living facilities and group homes ): No      Resident in a special setting where the risk of COVID-19 transmission may be high? (this may include long-term care, correctional and intermediate facilities; homeless shelters; assisted-living facilities and group homes ): No      No results found for: [address] [facility] [address], [address] [facility], [address] [facility], [facility], [address] [facility], [facility]    Review of Systems   Constitutional: Positive for chills and fever  Negative for fatigue  HENT: Positive for congestion, rhinorrhea and sore throat  Respiratory: Positive for cough  Negative for chest tightness and shortness of breath  Gastrointestinal: Negative for abdominal pain, diarrhea, nausea and vomiting  Musculoskeletal: Negative for myalgias  Neurological: Positive for headaches       Current Outpatient Medications on File Prior to Visit   Medication Sig   • acetaminophen (TYLENOL) 650 mg CR tablet Take 1,300 mg by mouth every 8 (eight) hours as needed for mild pain   • buPROPion (Wellbutrin XL) 300 mg 24 hr tablet Take 1 tablet (300 mg total) by mouth daily   • calcium carbonate (OS-STELLA) 600 MG tablet Take 600 mg by mouth 2 (two) times a day with meals PRN   • gabapentin (NEURONTIN) 100 mg capsule TAKE 1 CAPSULE BY MOUTH EVERYDAY AT BEDTIME   • naproxen (NAPROSYN) 500 mg tablet TAKE 1 TABLET BY MOUTH TWICE DAILY WITH FOOD FOR 10 DAYS THEN AS NEEDED       Objective: There were no vitals taken for this visit       Physical Exam  Isadora Bond DO

## 2022-12-22 DIAGNOSIS — G25.81 RESTLESS LEG: ICD-10-CM

## 2022-12-22 RX ORDER — GABAPENTIN 100 MG/1
CAPSULE ORAL
Qty: 30 CAPSULE | Refills: 0 | Status: SHIPPED | OUTPATIENT
Start: 2022-12-22

## 2023-01-11 ENCOUNTER — OFFICE VISIT (OUTPATIENT)
Dept: BARIATRICS | Facility: CLINIC | Age: 68
End: 2023-01-11

## 2023-01-11 VITALS
HEIGHT: 64 IN | DIASTOLIC BLOOD PRESSURE: 78 MMHG | RESPIRATION RATE: 14 BRPM | OXYGEN SATURATION: 99 % | WEIGHT: 185.4 LBS | HEART RATE: 89 BPM | BODY MASS INDEX: 31.65 KG/M2 | SYSTOLIC BLOOD PRESSURE: 110 MMHG

## 2023-01-11 DIAGNOSIS — E66.9 OBESITY, CLASS I, BMI 30-34.9: Primary | ICD-10-CM

## 2023-01-11 DIAGNOSIS — E78.2 MIXED HYPERLIPIDEMIA: ICD-10-CM

## 2023-01-11 NOTE — PROGRESS NOTES
Assessment/Plan:     Obesity, Class I, BMI 30-34 9  - Patient is pursuing Conservative Program  - Initial weight loss goal of 5-10% weight loss for improved health  - She denies history of seizures or glaucoma  - Wellbutrin started 9/15/2022 at weight of 189 lbs  - Continue Wellbutrin  mg daily - tolerating well and helping with appetite  - Labs reviewed: CMP and lipid panel 12/5/2022  Fasting glucose elevated, HDL low and LDL elevated, which will all likely improve with weight loss  Remainder of labs within acceptable range  Initial: 193 5 lbs  Current: 185 4 lbs BMI 32 33  Change: -8 1 lbs  Goal: 140-150 lbs    Goals:  Continue food logging  9986-1630 calories per day  Keep up the great work with gym workouts   Keep up the great work with water intake at least 64 oz daily   Continue Wellbutrin     Mixed hyperlipidemia  - Will likely improve with weight loss and lifestyle modification  Ronal Rausch was seen today for follow-up  Diagnoses and all orders for this visit:    Obesity, Class I, BMI 30-34 9    Mixed hyperlipidemia          Follow up in approximately 2 months with Non-Surgical Physician/Advanced Practitioner  Subjective:   Chief Complaint   Patient presents with   • Follow-up     MWM 2 mth fu, waist 39       Patient ID: Patsie Brunner  is a 78 y o  female with excess weight/obesity here to pursue weight management  Patient is pursuing Conservative Program    Most recent notes and records were reviewed  HPI    Wt Readings from Last 10 Encounters:   01/11/23 84 1 kg (185 lb 6 4 oz)   12/06/22 83 9 kg (185 lb)   11/09/22 86 kg (189 lb 8 oz)   09/15/22 85 7 kg (189 lb)   07/14/22 87 8 kg (193 lb 8 oz)   04/07/22 85 3 kg (188 lb)   03/21/22 87 3 kg (192 lb 6 oz)   11/30/21 87 2 kg (192 lb 3 2 oz)   10/29/21 85 kg (187 lb 8 oz)   08/11/21 86 4 kg (190 lb 6 4 oz)     Has been food logging intermittently  Getting around 5953-1780 calories per day around the holidays   Was visiting son in [place] over the holidays  Taking Wellbutrin  mg daily  No negative side effects  Has noticed more of a difference with appetite since increasing dose  Appetite under good control, as well as cravings  Feeling less stressed  B- protein shake with skim milk and fruit   S- none or apple or strawberries  L- salad with grilled chicken or yogurt or tuna fish  S- none  D- fish and veggies and rarely starch   S- none      Hydration: at least 64 oz water, 2-3 cups hot tea per week occ with splenda and skim milk, 1 cup daily diet iced tea, 3 cans diet V8 juice weekly, unsweetened almond or skim milk milk with protein shakes  Alcohol: 1 drink per month if that  Smoking: denies  Exercise: [employer] gym 4 days per week for 50 minutes does cardio and weight training  Right shoulder injury      Occupation: has [employer] business  Sleep: 5-6 hours     Colonoscopy: UTD, due 11/2028  Mammogram: UTD, had 1/10/2023           The following portions of the patient's history were reviewed and updated as appropriate: allergies, current medications, past family history, past medical history, past social history, past surgical history, and problem list     Family History   Problem Relation Age of Onset   • Diabetes Mother    • Hypertension Mother    • Diabetes type II Mother         Type 2 diabetes mellitus   • Hypertension Father    • Heart disease Father         Myocardial ischemia   • Abdominal aortic aneurysm Father    • Heart defect Sister    • Stroke Sister    • Heart Valve Disease Sister         patent foramen ovale found    • Clotting disorder Sister    • Mental illness Sister    • Hyperlipidemia Sister    • Cancer Brother         lung   • Diabetes type II Brother         Type 2 diabetes mellitus   • Hypertension Brother    • Hyperlipidemia Brother    • Heart disease Brother    • Diabetes Brother    • Thyroid disease Neg Hx         Review of Systems   HENT: Negative for sore throat      Respiratory: Negative for cough and shortness of breath  Cardiovascular: Negative for chest pain and palpitations  Gastrointestinal: Negative for abdominal pain, constipation, diarrhea, nausea and vomiting  Denies GERD   Musculoskeletal: Positive for arthralgias (arthritis) and back pain (chronic)  Skin: Negative for rash  Psychiatric/Behavioral: Negative for suicidal ideas (or HI)  Denies depression and anxiety       Objective:  /78   Pulse 89   Resp 14   Ht 5' 3 5" (1 613 m)   Wt 84 1 kg (185 lb 6 4 oz)   SpO2 99%   BMI 32 33 kg/m²     Physical Exam  Vitals and nursing note reviewed  Constitutional   General appearance: Abnormal   well developed and obese  Eyes No conjunctival injection  Ears, Nose, Mouth, and Throat Oral mucosa moist    Pulmonary   Respiratory effort: No increased work of breathing or signs of respiratory distress  Cardiovascular     Examination of extremities for edema and/or varicosities: Normal   no edema  Abdomen   Abdomen: Abnormal   The abdomen was obese      Musculoskeletal   Normal range of motion  Neurological   Gait and station: Normal     Psychiatric   Orientation to person, place and time: Normal     Affect: appropriate

## 2023-01-12 NOTE — ASSESSMENT & PLAN NOTE
- Patient is pursuing Conservative Program  - Initial weight loss goal of 5-10% weight loss for improved health  - She denies history of seizures or glaucoma  - Wellbutrin started 9/15/2022 at weight of 189 lbs  - Continue Wellbutrin  mg daily - tolerating well and helping with appetite  - Labs reviewed: CMP and lipid panel 12/5/2022  Fasting glucose elevated, HDL low and LDL elevated, which will all likely improve with weight loss  Remainder of labs within acceptable range       Initial: 193 5 lbs  Current: 185 4 lbs BMI 32 33  Change: -8 1 lbs  Goal: 140-150 lbs    Goals:  Continue food logging  4563-6286 calories per day  Keep up the great work with gym workouts   Keep up the great work with water intake at least 64 oz daily   Continue Wellbutrin

## 2023-01-23 DIAGNOSIS — G25.81 RESTLESS LEG: ICD-10-CM

## 2023-01-23 RX ORDER — GABAPENTIN 100 MG/1
CAPSULE ORAL
Qty: 30 CAPSULE | Refills: 2 | Status: SHIPPED | OUTPATIENT
Start: 2023-01-23

## 2023-01-25 ENCOUNTER — EVALUATION (OUTPATIENT)
Dept: PHYSICAL THERAPY | Facility: MEDICAL CENTER | Age: 68
End: 2023-01-25

## 2023-01-25 DIAGNOSIS — M75.51 SUBACROMIAL BURSITIS OF RIGHT SHOULDER JOINT: ICD-10-CM

## 2023-01-25 DIAGNOSIS — M75.101 TEAR OF RIGHT SUPRASPINATUS TENDON: ICD-10-CM

## 2023-01-25 DIAGNOSIS — M25.511 ACUTE PAIN OF RIGHT SHOULDER: Primary | ICD-10-CM

## 2023-01-25 NOTE — PROGRESS NOTES
PT Evaluation     Today's date: 2023  Patient name: Opal Ward  : 1955  MRN: [id number]  Referring provider: Araseli Caballero DO  Dx:   Encounter Diagnoses   Name Primary? • Acute pain of right shoulder Yes   • Subacromial bursitis of right shoulder joint    • Tear of right supraspinatus tendon                   Assessment  Assessment details: Opal Ward is a 79 y/o female who presents with complaints of acute right shoulder pain  The patient’s greatest concern is not being able to lift and reach without pain  Primary movement impairment diagnosis of right glenohumeral joint hypomobility and scapular neuromuscular control deficits, resulting in pathoanatomical symptoms of acute pain of right shoulder secondary to subacromial bursitis and distal supraspinatus tear, which limits her ability to perform functional activities without pain  Pt  will benefit from skilled PT services that includes manual therapy techniques to enhance tissue extensibility, neuromuscular re-education to facilitate motor control, therapeutic exercise to increase functional mobility, and modalities prn to reduce pain and inflammation    Impairments: abnormal muscle firing, abnormal or restricted ROM, abnormal movement, activity intolerance, impaired physical strength, lacks appropriate home exercise program, pain with function, scapular dyskinesis and poor posture   Understanding of Dx/Px/POC: good   Prognosis: good    Goals  Impairment Goals  - Pt I with initial HEP in 1-2 visits  - Improve ROM equal to contralateral side in 4-6 weeks  - Increase strength to 5/5 in all affected areas in 4-6 weeks    Functional Goals  - Increase Functional Status Measure to: 72  - Patient will be independent with comprehensive HEP in 6-8 weeks  - Patient will be able to reach for object from shelf at shoulder height with min reports of difficulty in 2-4 weeks  - Patient will be able to reach for object overhead with min to difficulty in 6-8 weeks  - Patient will be able to lift/carry objects without provocation of symptoms in 6-8 weeks        Plan  Patient would benefit from: PT eval  Planned modality interventions: thermotherapy: hydrocollator packs  Planned therapy interventions: joint mobilization, manual therapy, neuromuscular re-education, patient education, postural training, strengthening, stretching, therapeutic activities, therapeutic exercise, home exercise program, graded activity, flexibility, body mechanics training and abdominal trunk stabilization  Frequency: 2x week  Duration in weeks: 8  Treatment plan discussed with: patient      Subjective Evaluation    History of Present Illness  Mechanism of injury: Patient states that she has been going to the gym regularly  She recalls aggravating her right shoulder in December 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  Patient saw Dr Kristal Irby on 23 and had a CSI, which provided relief  MRI revealed a distal supraspinatus tear  Patient reports occasional pain at night when sleeping on the right side  She denies neck pain and R UE paresthesias  Patient would like to be able to continue to workout at the gym and perform all daily activities without pain  Pain  Current pain rating: 3  At best pain rating: 3  At worst pain ratin  Location: In the right shoulder  Quality: dull ache, tight and sharp    Treatments  Previous treatment: injection treatment  Current treatment: physical therapy  Patient Goals  Patient goals for therapy: decreased pain, increased motion, increased strength and independence with ADLs/IADLs  Patient goal:        Objective     Postural Observations  Seated posture: fair        Palpation     Right   No palpable tenderness to the biceps  Hypertonic in the upper trapezius  Tenderness of the supraspinatus and upper trapezius  Tenderness     Right Shoulder  Tenderness in the subacromial bursa and supraspinatus tendon  No tenderness in the biceps tendon (proximal)  Cervical/Thoracic Screen   Cervical range of motion within normal limits    Neurological Testing     Sensation     Shoulder   Left Shoulder   Intact: light touch    Right Shoulder   Intact: light touch    Additional Neurological Details  Reflexes NT  Active Range of Motion   Left Shoulder   Flexion: 180 degrees   Abduction: 180 degrees   External rotation BTH: T3   Internal rotation BTB: T10     Right Shoulder   Flexion: 120 degrees   Abduction: 120 degrees   External rotation BTH: C7   Internal rotation BTB: L5     Passive Range of Motion   Left Shoulder   Normal passive range of motion    Right Shoulder   Flexion: 170 degrees   Abduction: 160 degrees   External rotation 90°: 90 degrees   Internal rotation 90°: 45 degrees     Scapular Mobility   Left Shoulder   Scapular mobility: WFL    Right Shoulder   Scapular mobility: fair  Scapular Mobility with Shoulder to 90° FF   Scapular elevation: severe    Joint Play   Left Shoulder  Joints within functional limits are the anterior capsule, posterior capsule and inferior capsule  Right Shoulder  Joints within functional limits are the anterior capsule  Hypomobile in the posterior capsule and inferior capsule  Strength/Myotome Testing     Left Shoulder     Planes of Motion   Flexion: 5   Abduction: 5   External rotation at 0°: 5   Internal rotation at 0°: 5     Isolated Muscles   Biceps: 5   Subscapularis: 5   Supraspinatus: 5   Triceps: 5   Upper trapezius: 5     Right Shoulder     Planes of Motion   Flexion: 5   Abduction: 4-   External rotation at 0°: 5   Internal rotation at 0°: 5     Isolated Muscles   Biceps: 5   Subscapularis: 5   Supraspinatus: 4-   Triceps: 5   Upper trapezius: 5     Tests     Right Shoulder   Positive empty can, Hawkin's, Neer's and painful arc  Negative belly press, drop arm and Speed's          Precautions:  None    Manuals 1/25            G-H mobs AZ            Scap mobs AZ                                      Neuro Re-Ed Scap 4 2x10 5s                                                   Ther Ex             Table slides FE AAROM 30x                                                                                                       Ther Activity                                       Gait Training                                       Modalities              10Maldonado Borja, PT  1/25/2023,12:39 PM

## 2023-01-25 NOTE — LETTER
2023    Radhika Mccarthy DO  [address] [facility]    Patient: Cong Sunshine   YOB: 1955   Date of Visit: 2023     Encounter Diagnosis     ICD-10-CM    1  Acute pain of right shoulder  M25 511       2  Subacromial bursitis of right shoulder joint  M75 51       3  Tear of right supraspinatus tendon  M75 101           Dear Dr Cornelio Mendoza:    Thank you for your recent referral of Cong Sunshine  Please review the attached evaluation summary from Kaya's recent visit  Please verify that you agree with the plan of care by signing the attached order  If you have any questions or concerns, please do not hesitate to call  I sincerely appreciate the opportunity to share in the care of one of your patients and hope to have another opportunity to work with you in the near future  Sincerely,    Leena Cullen, PT      Referring Provider:      I certify that I have read the below Plan of Care and certify the need for these services furnished under this plan of treatment while under my care  Radhika Mccarthy DO  [id number] [facility] [address]  Via Fax: [phone]          PT Evaluation     Today's date: 2023  Patient name: Cong Sunshine  : 1955  MRN: [id number]  Referring provider: Bakari Silverio DO  Dx:   Encounter Diagnoses   Name Primary? • Acute pain of right shoulder Yes   • Subacromial bursitis of right shoulder joint    • Tear of right supraspinatus tendon                   Assessment  Assessment details: Cong Sunshine is a 77 y/o female who presents with complaints of acute right shoulder pain  The patient’s greatest concern is not being able to lift and reach without pain    Primary movement impairment diagnosis of right glenohumeral joint hypomobility and scapular neuromuscular control deficits, resulting in pathoanatomical symptoms of acute pain of right shoulder secondary to subacromial bursitis and distal supraspinatus tear, which limits her ability to perform functional activities without pain  Pt  will benefit from skilled PT services that includes manual therapy techniques to enhance tissue extensibility, neuromuscular re-education to facilitate motor control, therapeutic exercise to increase functional mobility, and modalities prn to reduce pain and inflammation  Impairments: abnormal muscle firing, abnormal or restricted ROM, abnormal movement, activity intolerance, impaired physical strength, lacks appropriate home exercise program, pain with function, scapular dyskinesis and poor posture   Understanding of Dx/Px/POC: good   Prognosis: good    Goals  Impairment Goals  - Pt I with initial HEP in 1-2 visits  - Improve ROM equal to contralateral side in 4-6 weeks  - Increase strength to 5/5 in all affected areas in 4-6 weeks    Functional Goals  - Increase Functional Status Measure to: 72  - Patient will be independent with comprehensive HEP in 6-8 weeks  - Patient will be able to reach for object from shelf at shoulder height with min reports of difficulty in 2-4 weeks  - Patient will be able to reach for object overhead with min to difficulty in 6-8 weeks  - Patient will be able to lift/carry objects without provocation of symptoms in 6-8 weeks        Plan  Patient would benefit from: PT eval  Planned modality interventions: thermotherapy: hydrocollator packs  Planned therapy interventions: joint mobilization, manual therapy, neuromuscular re-education, patient education, postural training, strengthening, stretching, therapeutic activities, therapeutic exercise, home exercise program, graded activity, flexibility, body mechanics training and abdominal trunk stabilization  Frequency: 2x week  Duration in weeks: 8  Treatment plan discussed with: patient      Subjective Evaluation    History of Present Illness  Mechanism of injury: Patient states that she has been going to the gym regularly    She recalls aggravating her right shoulder in December 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  Patient saw Dr Idania Sims on 23 and had a CSI, which provided relief  MRI revealed a distal supraspinatus tear  Patient reports occasional pain at night when sleeping on the right side  She denies neck pain and R UE paresthesias  Patient would like to be able to continue to workout at the gym and perform all daily activities without pain  Pain  Current pain rating: 3  At best pain rating: 3  At worst pain ratin  Location: In the right shoulder  Quality: dull ache, tight and sharp    Treatments  Previous treatment: injection treatment  Current treatment: physical therapy  Patient Goals  Patient goals for therapy: decreased pain, increased motion, increased strength and independence with ADLs/IADLs  Patient goal:        Objective     Postural Observations  Seated posture: fair        Palpation     Right   No palpable tenderness to the biceps  Hypertonic in the upper trapezius  Tenderness of the supraspinatus and upper trapezius  Tenderness     Right Shoulder  Tenderness in the subacromial bursa and supraspinatus tendon  No tenderness in the biceps tendon (proximal)  Cervical/Thoracic Screen   Cervical range of motion within normal limits    Neurological Testing     Sensation     Shoulder   Left Shoulder   Intact: light touch    Right Shoulder   Intact: light touch    Additional Neurological Details  Reflexes NT      Active Range of Motion   Left Shoulder   Flexion: 180 degrees   Abduction: 180 degrees   External rotation BTH: T3   Internal rotation BTB: T10     Right Shoulder   Flexion: 120 degrees   Abduction: 120 degrees   External rotation BTH: C7   Internal rotation BTB: L5     Passive Range of Motion   Left Shoulder   Normal passive range of motion    Right Shoulder   Flexion: 170 degrees   Abduction: 160 degrees   External rotation 90°: 90 degrees   Internal rotation 90°: 45 degrees     Scapular Mobility   Left Shoulder   Scapular mobility: WFL    Right Shoulder   Scapular mobility: fair  Scapular Mobility with Shoulder to 90° FF   Scapular elevation: severe    Joint Play   Left Shoulder  Joints within functional limits are the anterior capsule, posterior capsule and inferior capsule  Right Shoulder  Joints within functional limits are the anterior capsule  Hypomobile in the posterior capsule and inferior capsule  Strength/Myotome Testing     Left Shoulder     Planes of Motion   Flexion: 5   Abduction: 5   External rotation at 0°: 5   Internal rotation at 0°: 5     Isolated Muscles   Biceps: 5   Subscapularis: 5   Supraspinatus: 5   Triceps: 5   Upper trapezius: 5     Right Shoulder     Planes of Motion   Flexion: 5   Abduction: 4-   External rotation at 0°: 5   Internal rotation at 0°: 5     Isolated Muscles   Biceps: 5   Subscapularis: 5   Supraspinatus: 4-   Triceps: 5   Upper trapezius: 5     Tests     Right Shoulder   Positive empty can, Hawkin's, Neer's and painful arc  Negative belly press, drop arm and Speed's         Precautions:  None    Manuals 1/25            G-H mobs AZ            Scap mobs AZ                                      Neuro Re-Ed             Scap 4 2x10 5s                                                   Ther Ex             Table slides FE AAROM 30x                                                                                                       Ther Activity                                       Gait Training                                       Modalities              Charo Pearson, PT  1/25/2023,12:39 PM

## 2023-02-01 ENCOUNTER — OFFICE VISIT (OUTPATIENT)
Dept: PHYSICAL THERAPY | Facility: MEDICAL CENTER | Age: 68
End: 2023-02-01

## 2023-02-01 DIAGNOSIS — M75.101 TEAR OF RIGHT SUPRASPINATUS TENDON: ICD-10-CM

## 2023-02-01 DIAGNOSIS — M75.51 SUBACROMIAL BURSITIS OF RIGHT SHOULDER JOINT: ICD-10-CM

## 2023-02-01 DIAGNOSIS — M25.511 ACUTE PAIN OF RIGHT SHOULDER: Primary | ICD-10-CM

## 2023-02-01 NOTE — PROGRESS NOTES
Daily Note     Today's date: 2023  Patient name: Ciarra Urbina  : 1955  MRN: [id number]  Referring provider: Radha Sidhu DO  Dx:   Encounter Diagnosis     ICD-10-CM    1  Acute pain of right shoulder  M25 511       2  Subacromial bursitis of right shoulder joint  M75 51       3  Tear of right supraspinatus tendon  M75 101                      Subjective:  Patient states that she is doing okay  Objective: See treatment diary below  Assessment:  Patient demonstrates good tolerance to progressions c/ increased ROM and decreased pain  Tolerated treatment well  Patient would benefit from continued PT  Plan: Continue per plan of care        Precautions:  None    Manuals            G-H mobs AZ AZ           Scap mobs AZ AZ                                     Neuro Re-Ed             Scap 4 2x10 5s 2x10 5s yellow                                                  Ther Ex             Table slides FE AAROM 30x 30x           Pulleys  5'           SL ER  3x10           SL FE  3x10           alph  3x           Wall slides  3x10                                     Ther Activity                                       Gait Training                                       Modalities              10' 10'

## 2023-02-03 ENCOUNTER — OFFICE VISIT (OUTPATIENT)
Dept: PHYSICAL THERAPY | Facility: MEDICAL CENTER | Age: 68
End: 2023-02-03

## 2023-02-03 DIAGNOSIS — M75.51 SUBACROMIAL BURSITIS OF RIGHT SHOULDER JOINT: ICD-10-CM

## 2023-02-03 DIAGNOSIS — M75.101 TEAR OF RIGHT SUPRASPINATUS TENDON: ICD-10-CM

## 2023-02-03 DIAGNOSIS — M25.511 ACUTE PAIN OF RIGHT SHOULDER: Primary | ICD-10-CM

## 2023-02-03 NOTE — PROGRESS NOTES
Daily Note     Today's date: 2/3/2023  Patient name: Jose Monterroso  : 1955  MRN: [id number]  Referring provider: Joe Hernandez DO  Dx:   Encounter Diagnosis     ICD-10-CM    1  Acute pain of right shoulder  M25 511       2  Subacromial bursitis of right shoulder joint  M75 51       3  Tear of right supraspinatus tendon  M75 101                      Subjective:  Patient states that her shoulder feels good  Objective: See treatment diary below  Assessment:  Patient demonstrates good tolerance to progressions c/ decreased shoulder pain  Tolerated treatment well  Patient would benefit from continued PT  Plan: Continue per plan of care        Precautions:  None    Manuals 1/25 2/1 2/3          G-H mobs AZ AZ AZ          Scap mobs AZ AZ AZ                                    Neuro Re-Ed             Scap 4 2x10 5s 2x10 5s yellow 3x10 5s yellow                                                 Ther Ex             Table slides FE AAROM 30x 30x 30x          Pulleys  5' 5'          SL ER  3x10 3x12          SL FE  3x10 3x12          alph  3x 3x          Wall slides  3x10 3x10                                    Ther Activity                                       Gait Training                                       Modalities              10' 10' 10'

## 2023-02-04 PROBLEM — Z00.00 MEDICARE ANNUAL WELLNESS VISIT, SUBSEQUENT: Status: RESOLVED | Noted: 2022-12-06 | Resolved: 2023-02-04

## 2023-02-08 ENCOUNTER — OFFICE VISIT (OUTPATIENT)
Dept: PHYSICAL THERAPY | Facility: MEDICAL CENTER | Age: 68
End: 2023-02-08

## 2023-02-08 DIAGNOSIS — M25.511 ACUTE PAIN OF RIGHT SHOULDER: Primary | ICD-10-CM

## 2023-02-08 DIAGNOSIS — M75.101 TEAR OF RIGHT SUPRASPINATUS TENDON: ICD-10-CM

## 2023-02-08 DIAGNOSIS — M75.51 SUBACROMIAL BURSITIS OF RIGHT SHOULDER JOINT: ICD-10-CM

## 2023-02-08 NOTE — PROGRESS NOTES
Daily Note     Today's date: 2023  Patient name: Glenroy Phan  : 1955  MRN: [id number]  Referring provider: Scottie Ram DO  Dx:   Encounter Diagnosis     ICD-10-CM    1  Acute pain of right shoulder  M25 511       2  Subacromial bursitis of right shoulder joint  M75 51       3  Tear of right supraspinatus tendon  M75 101                      Subjective:  Patient states that she is doing well  Objective: See treatment diary below  Assessment:  Patient demonstrates excellent progress c/ tx  Tolerated treatment well  Patient would benefit from continued PT  Plan: Continue per plan of care        Precautions:  None    Manuals 1/25 2/1 2/3 2/8         G-H mobs AZ AZ AZ AZ         Scap mobs AZ AZ AZ AZ                                   Neuro Re-Ed             Scap 4 2x10 5s 2x10 5s yellow 3x10 5s yellow 2x10 5s  red                                                Ther Ex             UBE    2' F/ 2' B         Table slides FE AAROM 30x 30x 30x 30x         Pulleys  5' 5' 5'         SL ER  3x10 3x12 3x15         SL FE  3x10 3x12 3x15         alph  3x 3x 3x 1#         Wall slides  3x10 3x10 3x15                                   Ther Activity                                       Gait Training                                       Modalities              10' 10' 10' 10'

## 2023-02-10 ENCOUNTER — OFFICE VISIT (OUTPATIENT)
Dept: PHYSICAL THERAPY | Facility: MEDICAL CENTER | Age: 68
End: 2023-02-10

## 2023-02-10 DIAGNOSIS — M75.51 SUBACROMIAL BURSITIS OF RIGHT SHOULDER JOINT: ICD-10-CM

## 2023-02-10 DIAGNOSIS — M75.101 TEAR OF RIGHT SUPRASPINATUS TENDON: ICD-10-CM

## 2023-02-10 DIAGNOSIS — M25.511 ACUTE PAIN OF RIGHT SHOULDER: Primary | ICD-10-CM

## 2023-02-10 NOTE — PROGRESS NOTES
Daily Note     Today's date: 2/10/2023  Patient name: Mike Weinstein  : 1955  MRN: [id number]  Referring provider: Ericka Small DO  Dx:   Encounter Diagnosis     ICD-10-CM    1  Acute pain of right shoulder  M25 511       2  Subacromial bursitis of right shoulder joint  M75 51       3  Tear of right supraspinatus tendon  M75 101                      Subjective:  Patient states that she is doing well  Objective: See treatment diary below  Assessment:  Patient demonstrates good tolerance to exercise progressions  She is without pain  Tolerated treatment well  Patient would benefit from continued PT  Plan: Continue per plan of care        Precautions:  None    Manuals 1/25 2/1 2/3 2/8 2/10        G-H mobs AZ AZ AZ AZ AZ        Scap mobs AZ AZ AZ AZ AZ                                  Neuro Re-Ed             Scap 4 2x10 5s 2x10 5s yellow 3x10 5s yellow 2x10 5s  red 3x10 5s  red                                               Ther Ex             UBE    2' F/ 2' B 3' F/ 3' B        Table slides FE AAROM 30x 30x 30x 30x 30x        Pulleys  5' 5' 5' 5'        SL ER  3x10 3x12 3x15 3x10 1#        SL FE  3x10 3x12 3x15 3x10 1#        alph  3x 3x 3x 1# 3x 1#        Wall slides  3x10 3x10 3x15 3x10 stand                                  Ther Activity                                       Gait Training                                       Modalities              10' 10' 10' 10' 10'

## 2023-02-22 ENCOUNTER — OFFICE VISIT (OUTPATIENT)
Dept: PHYSICAL THERAPY | Facility: MEDICAL CENTER | Age: 68
End: 2023-02-22

## 2023-02-22 DIAGNOSIS — M75.51 SUBACROMIAL BURSITIS OF RIGHT SHOULDER JOINT: ICD-10-CM

## 2023-02-22 DIAGNOSIS — M25.511 ACUTE PAIN OF RIGHT SHOULDER: Primary | ICD-10-CM

## 2023-02-22 DIAGNOSIS — M75.101 TEAR OF RIGHT SUPRASPINATUS TENDON: ICD-10-CM

## 2023-02-22 NOTE — PROGRESS NOTES
Daily Note     Today's date: 2023  Patient name: Shon Zurita  : 1955  MRN: [id number]  Referring provider: Kolby Avitia DO  Dx:   Encounter Diagnosis     ICD-10-CM    1  Acute pain of right shoulder  M25 511       2  Subacromial bursitis of right shoulder joint  M75 51       3  Tear of right supraspinatus tendon  M75 101                      Subjective:  Patient states that she feels good  Objective: See treatment diary below  Assessment:  Patient demonstrates good tolerance to progressions without shoulder pain  Tolerated treatment well  Patient would benefit from continued PT  Plan: Continue per plan of care        Precautions:  None    Manuals 1/25 2/1 2/3 2/8 2/10 2/22       G-H mobs AZ AZ AZ AZ AZ AZ       Scap mobs AZ AZ AZ AZ AZ AZ                                 Neuro Re-Ed             Scap 4 2x10 5s 2x10 5s yellow 3x10 5s yellow 2x10 5s  red 3x10 5s  red 3x12 5s  red                                              Ther Ex             UBE    2' F/ 2' B 3' F/ 3' B 3' F/ 3' B       Table slides FE AAROM 30x 30x 30x 30x 30x 30x       Pulleys  5' 5' 5' 5'        SL ER  3x10 3x12 3x15 3x10 1# 3x12 1#       SL FE  3x10 3x12 3x15 3x10 1# 3x12 1#       alph  3x 3x 3x 1# 3x 1# 3x 1#       Wall slides  3x10 3x10 3x15 3x10 stand 3x12 stand                                 Ther Activity                                       Gait Training                                       Modalities              10' 10' 10' 10' 10' 10'

## 2023-02-24 ENCOUNTER — OFFICE VISIT (OUTPATIENT)
Dept: PHYSICAL THERAPY | Facility: MEDICAL CENTER | Age: 68
End: 2023-02-24

## 2023-02-24 DIAGNOSIS — M75.101 TEAR OF RIGHT SUPRASPINATUS TENDON: ICD-10-CM

## 2023-02-24 DIAGNOSIS — M75.51 SUBACROMIAL BURSITIS OF RIGHT SHOULDER JOINT: ICD-10-CM

## 2023-02-24 DIAGNOSIS — M25.511 ACUTE PAIN OF RIGHT SHOULDER: Primary | ICD-10-CM

## 2023-02-24 NOTE — PROGRESS NOTES
Daily Note     Today's date: 2023  Patient name: Naldo Branham  : 1955  MRN: [id number]  Referring provider: Sharron Fragoso DO  Dx:   Encounter Diagnosis     ICD-10-CM    1  Acute pain of right shoulder  M25 511       2  Subacromial bursitis of right shoulder joint  M75 51       3  Tear of right supraspinatus tendon  M75 101                      Subjective:  Patient states that she feels good  Objective: See treatment diary below  Assessment: Tolerated treatment well  Patient would benefit from continued PT  Plan: Continue per plan of care        Precautions:  None    Manuals 1/25 2/1 2/3 2/8 2/10 2/22 2/24      G-H mobs AZ AZ AZ AZ AZ AZ AZ      Scap mobs AZ AZ AZ AZ AZ AZ AZ                                Neuro Re-Ed             Scap 4 2x10 5s 2x10 5s yellow 3x10 5s yellow 2x10 5s  red 3x10 5s  red 3x12 5s  red 3x15 5s  red                                             Ther Ex             UBE    2' F/ 2' B 3' F/ 3' B 3' F/ 3' B 3' F/ 3' B      Table slides FE AAROM 30x 30x 30x 30x 30x 30x 30x      Pulleys  5' 5' 5' 5'  5'      SL ER  3x10 3x12 3x15 3x10 1# 3x12 1# 3x15 1#      SL FE  3x10 3x12 3x15 3x10 1# 3x12 1# 3x15 1#      alph  3x 3x 3x 1# 3x 1# 3x 1# 3x 1#      Wall slides  3x10 3x10 3x15 3x10 stand 3x12 stand 3x15 stand3                                Ther Activity                                       Gait Training                                       Modalities              10' 10' 10' 10' 10' 10' 10'

## 2023-03-05 DIAGNOSIS — M51.36 DISC DEGENERATION, LUMBAR: ICD-10-CM

## 2023-03-05 DIAGNOSIS — M54.41 ACUTE RIGHT-SIDED LOW BACK PAIN WITH RIGHT-SIDED SCIATICA: ICD-10-CM

## 2023-03-06 RX ORDER — NAPROXEN 500 MG/1
TABLET ORAL
Qty: 60 TABLET | Refills: 2 | Status: SHIPPED | OUTPATIENT
Start: 2023-03-06

## 2023-03-14 ENCOUNTER — TELEPHONE (OUTPATIENT)
Dept: BARIATRICS | Facility: CLINIC | Age: 68
End: 2023-03-14

## 2023-03-14 NOTE — TELEPHONE ENCOUNTER
Called patient and left a message to give the office a call back to reschedule her 3/15/23 appointment with Trey Millan that she had cancelled through My Chart

## 2023-04-03 ENCOUNTER — OFFICE VISIT (OUTPATIENT)
Dept: FAMILY MEDICINE CLINIC | Facility: CLINIC | Age: 68
End: 2023-04-03

## 2023-04-03 VITALS
WEIGHT: 190.6 LBS | HEIGHT: 64 IN | OXYGEN SATURATION: 97 % | RESPIRATION RATE: 16 BRPM | HEART RATE: 96 BPM | BODY MASS INDEX: 32.54 KG/M2 | SYSTOLIC BLOOD PRESSURE: 135 MMHG | TEMPERATURE: 97.6 F | DIASTOLIC BLOOD PRESSURE: 63 MMHG

## 2023-04-03 DIAGNOSIS — B34.9 VIRAL INFECTION: Primary | ICD-10-CM

## 2023-04-03 RX ORDER — BROMPHENIRAMINE MALEATE, PSEUDOEPHEDRINE HYDROCHLORIDE, AND DEXTROMETHORPHAN HYDROBROMIDE 2; 30; 10 MG/5ML; MG/5ML; MG/5ML
5 SYRUP ORAL 4 TIMES DAILY PRN
Qty: 120 ML | Refills: 0 | Status: SHIPPED | OUTPATIENT
Start: 2023-04-03

## 2023-04-03 RX ORDER — METHYLPREDNISOLONE 4 MG/1
TABLET ORAL
Qty: 21 EACH | Refills: 0 | Status: SHIPPED | OUTPATIENT
Start: 2023-04-03

## 2023-04-03 NOTE — ASSESSMENT & PLAN NOTE
Check covid and flu swab today Patient will take bromphed DM and the prednisone Discussed most sinus infections are viral and last 7-10 days

## 2023-04-03 NOTE — PROGRESS NOTES
Name: Raine Howard      : 1955      MRN: [id number]  Encounter Provider: Katlin Merrill DO  Encounter Date: 4/3/2023   Encounter department: [facility] PRIMARY CARE    Assessment & Plan     1  Viral infection  Assessment & Plan:  Check covid and flu swab today Patient will take bromphed DM and the prednisone Discussed most sinus infections are viral and last 7-10 days     Orders:  -     Covid/Flu- Office Collect  -     brompheniramine-pseudoephedrine-DM 30-2-10 MG/5ML syrup; Take 5 mL by mouth 4 (four) times a day as needed for congestion or cough  -     methylPREDNISolone 4 MG tablet therapy pack; Use as directed on package       Chief Complaint   Patient presents with   • Cough     Subjective      Patient just returned last night from a trip to [place] Patient states that on Thursday she started with congestion sinus pain and cough Patient was taking allergy meds and abby seltzer cold Patient has not taken a covid test Patient has no fever or chills Patient has some discolored mucous Patient has no fatigue Patient has no GI symptoms     URI   This is a new problem  Episode onset: 4 days ago  The problem has been unchanged  There has been no fever  Associated symptoms include congestion, coughing, rhinorrhea and sinus pain  Pertinent negatives include no abdominal pain, chest pain, diarrhea, dysuria, ear pain, headaches, joint pain, joint swelling, nausea, neck pain, plugged ear sensation, rash, sneezing, sore throat, swollen glands, vomiting or wheezing  She has tried antihistamine and decongestant for the symptoms  The treatment provided no relief  Review of Systems   Constitutional: Negative for fatigue and fever  HENT: Positive for congestion, rhinorrhea, sinus pressure and sinus pain  Negative for ear pain, sneezing and sore throat  Respiratory: Positive for cough  Negative for shortness of breath and wheezing  Cardiovascular: Negative for chest pain     Gastrointestinal: "Negative for abdominal pain, diarrhea, nausea and vomiting  Genitourinary: Negative for dysuria  Musculoskeletal: Negative for joint pain and neck pain  Skin: Negative for rash  Neurological: Negative for headaches  Current Outpatient Medications on File Prior to Visit   Medication Sig   • buPROPion (WELLBUTRIN XL) 300 mg 24 hr tablet TAKE 1 TABLET BY MOUTH EVERY DAY   • calcium carbonate (OS-STELLA) 600 MG tablet Take 600 mg by mouth 2 (two) times a day with meals PRN   • gabapentin (NEURONTIN) 100 mg capsule TAKE 1 CAPSULE BY MOUTH EVERYDAY AT BEDTIME   • naproxen (NAPROSYN) 500 mg tablet TAKE 1 TABLET BY MOUTH TWICE DAILY WITH FOOD FOR 10 DAYS THEN AS NEEDED   • acetaminophen (TYLENOL) 650 mg CR tablet Take 1,300 mg by mouth every 8 (eight) hours as needed for mild pain       Objective     /63 (BP Location: Right arm, Patient Position: Sitting, Cuff Size: Large)   Pulse 96   Temp 97 6 °F (36 4 °C) (Temporal)   Resp 16   Ht 5' 3 5\" (1 613 m)   Wt 86 5 kg (190 lb 9 6 oz)   SpO2 97%   BMI 33 23 kg/m²     Physical Exam  Vitals and nursing note reviewed  Constitutional:       Appearance: She is obese  HENT:      Head: Normocephalic  Right Ear: Tympanic membrane and external ear normal       Left Ear: Tympanic membrane and external ear normal       Nose: Congestion present  Mouth/Throat:      Mouth: Mucous membranes are moist    Eyes:      Extraocular Movements: Extraocular movements intact  Conjunctiva/sclera: Conjunctivae normal       Pupils: Pupils are equal, round, and reactive to light  Cardiovascular:      Rate and Rhythm: Normal rate and regular rhythm  Heart sounds: Normal heart sounds  Pulmonary:      Effort: Pulmonary effort is normal       Breath sounds: Normal breath sounds  Neurological:      General: No focal deficit present  Mental Status: She is alert and oriented to person, place, and time     Psychiatric:         Mood and Affect: Mood normal    " Behavior: Behavior normal        Shari Goldstein, DO

## 2023-04-03 NOTE — PATIENT INSTRUCTIONS
Sinusitis   WHAT YOU NEED TO KNOW:   What is sinusitis? Sinusitis is inflammation or infection of your sinuses  Sinusitis is most often caused by a virus  Acute sinusitis may last up to 12 weeks  Chronic sinusitis lasts longer than 12 weeks  Recurrent sinusitis means you have 4 or more infections in 1 year  What increases my risk for sinusitis? Medical conditions, such as an upper respiratory infection, allergies, asthma, or cystic fibrosis    Dental infections or procedures, such as gum infections, tooth decay, or a root canal    Smoking or exposure to secondhand smoke    Abnormal sinus structure, such as nasal growths, swollen tonsils, or a deviated septum    A weak immune system, from diseases such as diabetes or HIV    What are the signs and symptoms of sinusitis? Fever    Pain, pressure, redness, or swelling around the forehead, cheeks, or eyes    Thick yellow or green discharge from your nose    Tenderness when you touch your face over your sinuses    Dry cough that happens mostly at night or when you lie down    Headache and face pain that is worse when you lean forward    Tooth pain, or pain when you chew    How is sinusitis diagnosed? Your healthcare provider will examine you and ask about your symptoms  He or she may check inside your nose using a nasal speculum  You may need any of the following tests:  A sample of mucus from your nose  may show what germ is causing your infection  A CT or MRI of your head  may show the mucous lining of your sinuses  You may be given contrast liquid to help your sinuses show up better in the pictures  Tell your healthcare provider if you have ever had an allergic reaction to contrast liquid  Do not enter the MRI room with anything metal  Metal can cause serious injury  Tell your healthcare provider if you have any metal in or on your body  How is sinusitis treated? Your symptoms may go away on their own   Your healthcare provider may recommend watchful waiting for up to 10 days before starting antibiotics  You may need any of the following:  Acetaminophen  decreases pain and fever  It is available without a doctor's order  Ask how much to take and how often to take it  Follow directions  Read the labels of all other medicines you are using to see if they also contain acetaminophen, or ask your doctor or pharmacist  Acetaminophen can cause liver damage if not taken correctly  NSAIDs , such as ibuprofen, help decrease swelling, pain, and fever  This medicine is available with or without a doctor's order  NSAIDs can cause stomach bleeding or kidney problems in certain people  If you take blood thinner medicine, always ask your healthcare provider if NSAIDs are safe for you  Always read the medicine label and follow directions  Nasal steroid sprays  may help decrease inflammation in your nose and sinuses  Decongestants  help reduce swelling and drain mucus in the nose and sinuses  They may help you breathe easier  Antihistamines  help dry mucus in the nose and relieve sneezing  Antibiotics  help treat or prevent a bacterial infection  How can I manage my symptoms? Rinse your sinuses as directed  Use a sinus rinse device to rinse your nasal passages with a saline (salt water) solution or distilled water  Do not use tap water  This will help thin the mucus in your nose and rinse away pollen and dirt  It will also help reduce swelling so you can breathe normally  Use a humidifier  to increase air moisture in your home  This may make it easier for you to breathe and help decrease your cough  Sleep with your head elevated  Place an extra pillow under your head before you go to sleep to help your sinuses drain  Drink liquids as directed  Ask your healthcare provider how much liquid to drink each day and which liquids are best for you  Liquids will thin the mucus in your nose and help it drain   Avoid drinks that contain alcohol or caffeine  Do not smoke, and avoid secondhand smoke  Nicotine and other chemicals in cigarettes and cigars can make your symptoms worse  Ask your healthcare provider for information if you currently smoke and need help to quit  E-cigarettes or smokeless tobacco still contain nicotine  Talk to your healthcare provider before you use these products  How can I help prevent the spread of germs? Wash your hands often with soap and water  Wash your hands after you use the bathroom, change a child's diaper, or sneeze  Wash your hands before you prepare or eat food  Stay away from people who are sick  Some germs spread easily and quickly through contact  When should I seek immediate care? You have trouble breathing or wheezing that is getting worse  You have a stiff neck, a fever, or a bad headache  You cannot open your eye  Your eyeball bulges out or you cannot move your eye  You are more sleepy than normal, or you notice changes in your ability to think, move, or talk  You have swelling of your forehead or scalp  When should I call my doctor? You have vision changes, such as double vision  Your eye and eyelid are red, swollen, and painful  Your symptoms do not improve or go away after 10 days  You have nausea and are vomiting  Your nose is bleeding  You have questions or concerns about your condition or care  CARE AGREEMENT:   You have the right to help plan your care  Learn about your health condition and how it may be treated  Discuss treatment options with your healthcare providers to decide what care you want to receive  You always have the right to refuse treatment  The above information is an  only  It is not intended as medical advice for individual conditions or treatments  Talk to your doctor, nurse or pharmacist before following any medical regimen to see if it is safe and effective for you    © Copyright [employer] 2022 Information is for End User's use only and may not be sold, redistributed or otherwise used for commercial purposes

## 2023-04-04 LAB
FLUAV RNA RESP QL NAA+PROBE: NEGATIVE
FLUBV RNA RESP QL NAA+PROBE: NEGATIVE
SARS-COV-2 RNA RESP QL NAA+PROBE: NEGATIVE

## 2023-04-30 DIAGNOSIS — G25.81 RESTLESS LEG: ICD-10-CM

## 2023-05-01 RX ORDER — GABAPENTIN 100 MG/1
CAPSULE ORAL
Qty: 30 CAPSULE | Refills: 2 | Status: SHIPPED | OUTPATIENT
Start: 2023-05-01

## 2023-06-02 ENCOUNTER — APPOINTMENT (OUTPATIENT)
Dept: LAB | Facility: MEDICAL CENTER | Age: 68
End: 2023-06-02
Payer: MEDICARE

## 2023-06-02 ENCOUNTER — OFFICE VISIT (OUTPATIENT)
Dept: BARIATRICS | Facility: CLINIC | Age: 68
End: 2023-06-02

## 2023-06-02 VITALS
HEIGHT: 64 IN | SYSTOLIC BLOOD PRESSURE: 122 MMHG | BODY MASS INDEX: 31.92 KG/M2 | DIASTOLIC BLOOD PRESSURE: 70 MMHG | RESPIRATION RATE: 16 BRPM | HEART RATE: 76 BPM | WEIGHT: 187 LBS

## 2023-06-02 DIAGNOSIS — E78.2 MIXED HYPERLIPIDEMIA: ICD-10-CM

## 2023-06-02 DIAGNOSIS — E66.09 CLASS 1 OBESITY DUE TO EXCESS CALORIES WITH SERIOUS COMORBIDITY AND BODY MASS INDEX (BMI) OF 32.0 TO 32.9 IN ADULT: ICD-10-CM

## 2023-06-02 DIAGNOSIS — R73.01 ELEVATED FASTING GLUCOSE: ICD-10-CM

## 2023-06-02 DIAGNOSIS — E66.9 OBESITY, CLASS I, BMI 30-34.9: Primary | ICD-10-CM

## 2023-06-02 LAB
ALBUMIN SERPL BCP-MCNC: 3.8 G/DL (ref 3.5–5)
ALP SERPL-CCNC: 65 U/L (ref 46–116)
ALT SERPL W P-5'-P-CCNC: 24 U/L (ref 12–78)
ANION GAP SERPL CALCULATED.3IONS-SCNC: 2 MMOL/L (ref 4–13)
AST SERPL W P-5'-P-CCNC: 13 U/L (ref 5–45)
BILIRUB SERPL-MCNC: 0.56 MG/DL (ref 0.2–1)
BUN SERPL-MCNC: 15 MG/DL (ref 5–25)
CALCIUM SERPL-MCNC: 8.7 MG/DL (ref 8.3–10.1)
CHLORIDE SERPL-SCNC: 110 MMOL/L (ref 96–108)
CHOLEST SERPL-MCNC: 215 MG/DL
CO2 SERPL-SCNC: 26 MMOL/L (ref 21–32)
CREAT SERPL-MCNC: 0.98 MG/DL (ref 0.6–1.3)
EST. AVERAGE GLUCOSE BLD GHB EST-MCNC: 105 MG/DL
GFR SERPL CREATININE-BSD FRML MDRD: 59 ML/MIN/1.73SQ M
GLUCOSE P FAST SERPL-MCNC: 93 MG/DL (ref 65–99)
HBA1C MFR BLD: 5.3 %
HDLC SERPL-MCNC: 55 MG/DL
LDLC SERPL CALC-MCNC: 141 MG/DL (ref 0–100)
NONHDLC SERPL-MCNC: 160 MG/DL
POTASSIUM SERPL-SCNC: 3.7 MMOL/L (ref 3.5–5.3)
PROT SERPL-MCNC: 6.9 G/DL (ref 6.4–8.4)
SODIUM SERPL-SCNC: 138 MMOL/L (ref 135–147)
TRIGL SERPL-MCNC: 97 MG/DL

## 2023-06-02 PROCEDURE — 83036 HEMOGLOBIN GLYCOSYLATED A1C: CPT

## 2023-06-02 PROCEDURE — 80053 COMPREHEN METABOLIC PANEL: CPT

## 2023-06-02 PROCEDURE — 36415 COLL VENOUS BLD VENIPUNCTURE: CPT

## 2023-06-02 PROCEDURE — 80061 LIPID PANEL: CPT

## 2023-06-02 RX ORDER — BUPROPION HYDROCHLORIDE 300 MG/1
300 TABLET ORAL DAILY
Qty: 30 TABLET | Refills: 3 | Status: SHIPPED | OUTPATIENT
Start: 2023-06-02

## 2023-06-02 NOTE — PROGRESS NOTES
Assessment/Plan:     Obesity, Class I, BMI 30-34 9  - Patient is pursuing Conservative Program  - Initial weight loss goal of 5-10% weight loss for improved health  - Postmenopausal  - Does not have coverage for FDA approved weight loss medications  - She denies history of seizures or glaucoma  - Wellbutrin started 9/15/2022 at weight of 189 lbs  - Continue Wellbutrin  mg daily - tolerating well and helping with appetite  Feeling less stress since starting Wellbutrin    - Has been very mindful of diet and exercise, but struggling to lose weight  I feel that she would benefit from metabolic testing and body comp testing to get a better sense of calorie needs  She was agreeable to this  - Depending upon the results of the body comp and metabolic testing, can consider adding Naltrexone to Wellbutrin to mimic Contrave  - Labs reviewed: CMP, lipid panel, and A1C 6/2/2023  Chol and LDL elevated, which will likely improve with weight loss  Remainder of the blood work within acceptable range  Initial: 193 5 lbs  Last visit: 185 4 lbs BMI 32 33  Current: 187 lbs BMI 32 61  Change: -6 5 lbs (-1 6 lbs since the last office visit)  Goal: 140-150 lbs    Goals:  Restart food logging  6107-4934 calories per day  Keep up the great work with gym workouts   Keep up the great work with water intake at least 64 oz daily   Continue Wellbutrin     Mixed hyperlipidemia  - May improve with weight loss  Christiana Sommers was seen today for follow-up  Diagnoses and all orders for this visit:    Obesity, Class I, BMI 30-34 9  -     buPROPion (WELLBUTRIN XL) 300 mg 24 hr tablet; Take 1 tablet (300 mg total) by mouth daily    Mixed hyperlipidemia          Follow up in approximately 3 months with Non-Surgical Physician/Advanced Practitioner      Subjective:   Chief Complaint   Patient presents with   • Follow-up     MWM 2mth f/u; waist 38in       Patient ID: Dayan Molina  is a 80 y o  female with excess weight/obesity here to pursue weight management  Patient is pursuing Conservative Program    Most recent notes and records were reviewed  HPI    Wt Readings from Last 10 Encounters:   06/02/23 84 8 kg (187 lb)   04/03/23 86 5 kg (190 lb 9 6 oz)   01/11/23 84 1 kg (185 lb 6 4 oz)   12/06/22 83 9 kg (185 lb)   11/09/22 86 kg (189 lb 8 oz)   09/15/22 85 7 kg (189 lb)   07/14/22 87 8 kg (193 lb 8 oz)   04/07/22 85 3 kg (188 lb)   03/21/22 87 3 kg (192 lb 6 oz)   11/30/21 87 2 kg (192 lb 3 2 oz)     Not food logging, but eating similarly  Was food logging in the past  Was on vacation, but was mindful of diet  Frustrated that she is not losing more weight  Taking Wellbutrin  mg daily  No negative side effects  Appetite under good control, as well as cravings  Feeling less stressed  B- protein shake with skim milk and fruit or overnight oats with skim milk or yogurt  S- none or fruit  L- salad with grilled chicken or yogurt or tuna fish  S- none  D- fish or chicken and veggies and rarely starch   S- none      Hydration: at least 64 oz water, V8 low sodium juice, V8 energy drink light or diet daily, unsweetened iced tea  Alcohol: 1 drink per month if that  Smoking: denies  Exercise: [employer] 4 days per week for 50 minutes does cardio and weight training   Right shoulder injury- certain upper body exercises limited due to this      Occupation: has own business  Sleep: 6 hours     Colonoscopy: UTD, due 11/2028  Mammogram: UTD, due Jan 2023           The following portions of the patient's history were reviewed and updated as appropriate: allergies, current medications, past family history, past medical history, past social history, past surgical history, and problem list     Family History   Problem Relation Age of Onset   • Diabetes Mother    • Hypertension Mother    • Diabetes type II Mother         Type 2 diabetes mellitus   • Hypertension Father    • Heart disease Father         Myocardial ischemia   • Abdominal "aortic aneurysm Father    • Heart defect Sister    • Stroke Sister    • Heart Valve Disease Sister         patent foramen ovale found    • Clotting disorder Sister    • Mental illness Sister    • Hyperlipidemia Sister    • Cancer Brother         lung   • Diabetes type II Brother         Type 2 diabetes mellitus   • Hypertension Brother    • Hyperlipidemia Brother    • Heart disease Brother    • Diabetes Brother    • Thyroid disease Neg Hx         Review of Systems   HENT: Negative for sore throat  Respiratory: Negative for cough and shortness of breath  Cardiovascular: Negative for chest pain and palpitations  Gastrointestinal: Positive for constipation  Negative for abdominal pain, diarrhea, nausea and vomiting  Denies GERD   Musculoskeletal: Positive for arthralgias (arthritis) and back pain (chronic)  Skin: Negative for rash  Psychiatric/Behavioral: Negative for suicidal ideas (or HI)  Denies depression and anxiety       Objective:  /70   Pulse 76   Resp 16   Ht 5' 3 5\" (1 613 m)   Wt 84 8 kg (187 lb)   BMI 32 61 kg/m²     Physical Exam  Vitals and nursing note reviewed  Constitutional   General appearance: Abnormal   well developed and obese  Eyes No conjunctival injection  Ears, Nose, Mouth, and Throat Oral mucosa moist    Pulmonary   Respiratory effort: No increased work of breathing or signs of respiratory distress  Cardiovascular     Examination of extremities for edema and/or varicosities: Normal   no edema  Abdomen   Abdomen: Abnormal   The abdomen was obese      Musculoskeletal   Normal range of motion  Neurological   Gait and station: Normal     Psychiatric   Orientation to person, place and time: Normal     Affect: appropriate        "

## 2023-06-03 NOTE — ASSESSMENT & PLAN NOTE
- Patient is pursuing Conservative Program  - Initial weight loss goal of 5-10% weight loss for improved health  - Postmenopausal  - Does not have coverage for FDA approved weight loss medications  - She denies history of seizures or glaucoma  - Wellbutrin started 9/15/2022 at weight of 189 lbs  - Continue Wellbutrin  mg daily - tolerating well and helping with appetite  Feeling less stress since starting Wellbutrin    - Has been very mindful of diet and exercise, but struggling to lose weight  I feel that she would benefit from metabolic testing and body comp testing to get a better sense of calorie needs  She was agreeable to this  - Depending upon the results of the body comp and metabolic testing, can consider adding Naltrexone to Wellbutrin to mimic Contrave  - Labs reviewed: CMP, lipid panel, and A1C 6/2/2023  Chol and LDL elevated, which will likely improve with weight loss  Remainder of the blood work within acceptable range         Initial: 193 5 lbs  Last visit: 185 4 lbs BMI 32 33  Current: 187 lbs BMI 32 61  Change: -6 5 lbs (-1 6 lbs since the last office visit)  Goal: 140-150 lbs    Goals:  Restart food logging  2085-3206 calories per day  Keep up the great work with gym workouts   Keep up the great work with water intake at least 64 oz daily   Continue Wellbutrin

## 2023-06-05 ENCOUNTER — RA CDI HCC (OUTPATIENT)
Dept: OTHER | Facility: HOSPITAL | Age: 68
End: 2023-06-05

## 2023-06-05 NOTE — PROGRESS NOTES
Weight Management Medical Nutrition Assessment  Ольга presented for a body stats package  Today's weight is 186 2#  Candi Fu is not food logging but is mindful of food choices  She was recently on vacation and reports eating a balanced diet  Her goal is to take the time to food log and improve her energy as she feels tired  Per dietary recall patient has long stretches between meals and consumes inadequate carbohydrates  She reports eliminating carbohydrates due to fear of weight gain  We reviewed the importance of carbohydrates and goals for meals and snacks  Discussed calorie goals as patient appear to be consuming inadequate calories  Reviewed a consistent meal and snack schedule  RD provided food journals  Hydration adequate  We developed and reviewed a low calorie meal plan  Completed a body composition using SECA scale and reviewed results with patient  Reevue indirect calorimter revealed REE is normal (+8%) compared to the predictive normal for someone her same age, height, and gender     Reevue Indirect Calorimeter REE: 1,570              Weight loss without exercise: 1,256-1,570           Weight loss with exercise: 0,710-2,855                       Maintenance: 6,590-0,137           Patient seen by Medical Provider in past 6 months:  yes  Requested to schedule appointment with Medical Provider: No      Anthropometric Measurements  Start Weight (#): 186 2#  Current Weight (#): 186 2#  TBW % Change from start weight: n/a  Ideal Body Weight (#): 117 5#  Goal Weight (#): 140-150#  Highest:  Lowest:    Weight Loss History  Previous weight loss attempts: Exercise  Self Created Diets (Portion Control, Healthy Food Choices, etc )    Food and Nutrition Related History  Wake up: 7:30AM  Bed Time: n/a    Food Recall  Breakfast: 9AM Atkins protein shake (160 calories, 15g protein)   Snack: skip  Lunch: 12-1PM salad, hard boiled egg, banana or apple  Snack: sometimes fruit or pretzels OR skip  Dinner: 5:30-7PM protein, vegetable, salad OR grilled sirloin or fish with vegetable or corn (usually avoids starches)  Snack: skip    Beverages: 6-8 bottles water, 1-2 glasses unsweetened iced tea, 1 can V8 energy   Alcohol: rarely  Volume of beverage intake: > 100oz    Weekends: Same  Cravings: no cravings reported  Trouble area of day: morning/mid-morning    Frequency of Eating out: occasionally   Food restrictions: none reported   Cooking: self   Food Shopping: self    Physical Activity Intake  Activity: [facility] [employer] (50 minutes cardio and weight training)  Frequency: 4 days per week  Physical limitations/barriers to exercise: right shoulder injury    Estimated Needs  Energy  SECA: BMR: Geovalerio Basilia Energy Needs: BMR : 1360   1-2# loss weekly sedentary: 632-1132             1-2# loss weekly lightly active: 870-1370  Maintenance calories for sedentary activity level: 1632  Protein: 64-80g     (1 2-1 5g/kg IBW)  Fluid: 65oz    (35mL/kg IBW)    Nutrition Diagnosis  Yes; Overweight/obesity  related to Excess energy intake as evidenced by  BMI more than normative standard for age and sex (obesity-grade I 26-30  9)       Nutrition Intervention    Nutrition Prescription  Calories: 1,200 calories  Protein: 64-80g  Fluid: 65oz    Meal Plan ([employer]/Pro/Carb)  Breakfast: 200/15/20  Snack: 100-150/5-10/15-20  Lunch: 300/20/30-45  Snack: 100-150/5-10/15-20  Dinner: 400/20/30-45  Snack:    Nutrition Education:    Calorie controlled menu  Lean protein food choices  Healthy snack options  Food journaling tips  Carbohydrate sources  Fiber sources    Nutrition Counseling:  Strategies: meal planning, portion sizes, healthy snack choices, hydration, fiber intake, protein intake, exercise, food journal, carbohydrates      Monitoring and Evaluation:  Evaluation criteria:  Energy Intake  Meet protein needs  Maintain adequate hydration  Monitor weekly weight  Meal planning/preparation  Food journal   Decreased portions at mealtimes and snacks  Physical activity     Barriers to learning:none  Readiness to change: Preparation:  (Getting ready to change)   Comprehension: good  Expected Compliance: good

## 2023-06-06 DIAGNOSIS — M54.41 ACUTE RIGHT-SIDED LOW BACK PAIN WITH RIGHT-SIDED SCIATICA: ICD-10-CM

## 2023-06-06 DIAGNOSIS — M51.36 DISC DEGENERATION, LUMBAR: ICD-10-CM

## 2023-06-06 RX ORDER — NAPROXEN 500 MG/1
TABLET ORAL
Qty: 60 TABLET | Refills: 2 | Status: SHIPPED | OUTPATIENT
Start: 2023-06-06

## 2023-06-08 ENCOUNTER — OFFICE VISIT (OUTPATIENT)
Dept: BARIATRICS | Facility: CLINIC | Age: 68
End: 2023-06-08

## 2023-06-08 VITALS — WEIGHT: 186.18 LBS | HEIGHT: 64 IN | BODY MASS INDEX: 31.79 KG/M2

## 2023-06-08 DIAGNOSIS — R63.5 ABNORMAL WEIGHT GAIN: ICD-10-CM

## 2023-06-08 PROCEDURE — BODSTAT PR BODY STAT

## 2023-06-08 PROCEDURE — RECHECK

## 2023-06-12 ENCOUNTER — OFFICE VISIT (OUTPATIENT)
Dept: FAMILY MEDICINE CLINIC | Facility: CLINIC | Age: 68
End: 2023-06-12
Payer: MEDICARE

## 2023-06-12 VITALS
BODY MASS INDEX: 31.92 KG/M2 | DIASTOLIC BLOOD PRESSURE: 78 MMHG | HEIGHT: 64 IN | WEIGHT: 187 LBS | SYSTOLIC BLOOD PRESSURE: 118 MMHG

## 2023-06-12 DIAGNOSIS — E66.09 CLASS 1 OBESITY DUE TO EXCESS CALORIES WITH SERIOUS COMORBIDITY AND BODY MASS INDEX (BMI) OF 32.0 TO 32.9 IN ADULT: ICD-10-CM

## 2023-06-12 DIAGNOSIS — C44.311 BASAL CELL CARCINOMA OF NOSE: ICD-10-CM

## 2023-06-12 DIAGNOSIS — E78.2 MIXED HYPERLIPIDEMIA: Primary | ICD-10-CM

## 2023-06-12 PROBLEM — E66.811 OBESITY, CLASS I, BMI 30-34.9: Status: RESOLVED | Noted: 2022-07-14 | Resolved: 2023-06-12

## 2023-06-12 PROBLEM — N18.31 STAGE 3A CHRONIC KIDNEY DISEASE (HCC): Status: RESOLVED | Noted: 2023-06-12 | Resolved: 2023-06-12

## 2023-06-12 PROBLEM — N18.31 STAGE 3A CHRONIC KIDNEY DISEASE (HCC): Status: ACTIVE | Noted: 2023-06-12

## 2023-06-12 PROBLEM — E66.9 OBESITY, CLASS I, BMI 30-34.9: Status: RESOLVED | Noted: 2022-07-14 | Resolved: 2023-06-12

## 2023-06-12 PROBLEM — U07.1 COVID-19: Status: RESOLVED | Noted: 2022-12-19 | Resolved: 2023-06-12

## 2023-06-12 PROBLEM — B34.9 VIRAL INFECTION: Status: RESOLVED | Noted: 2023-04-03 | Resolved: 2023-06-12

## 2023-06-12 PROCEDURE — 99214 OFFICE O/P EST MOD 30 MIN: CPT | Performed by: FAMILY MEDICINE

## 2023-06-12 NOTE — ASSESSMENT & PLAN NOTE
Weight is still an issue Patient to continue with exercise Patient also needs to continue wellbutrin and see the bariatric team for follow up

## 2023-06-12 NOTE — PROGRESS NOTES
Chief Complaint   Patient presents with   • Hyperlipidemia     No refills needed      Name: Damion Coburn      : 1955      MRN: [id number]  Encounter Provider: Luna Lopez DO  Encounter Date: 2023   Encounter department: [facility]    Assessment & Plan     1  Mixed hyperlipidemia  Assessment & Plan:  LDL is a bit high Patient diet was discussed She has increased the fiber in her diet also    Orders:  -     Comprehensive metabolic panel; Future; Expected date: 2023  -     Lipid panel; Future; Expected date: 2023    2  Class 1 obesity due to excess calories with serious comorbidity and body mass index (BMI) of 32 0 to 32 9 in adult  Assessment & Plan:  Weight is still an issue Patient to continue with exercise Patient also needs to continue wellbutrin and see the bariatric team for follow up      3  Basal cell carcinoma of nose  Assessment & Plan:  Continue with dermatology follow up Patient to continue to use sunscreen           Subjective      Patient is here for follow up of obesity basal cell carcinoma of nose and obesity She is taking the well butrin to assist with weight loss Patient is also going to the gym She has started logging her food and eating small amounts more frequently Her LDL cholesterol is 141 and HDL is 55 Patient is allergic to statin due to myalgia Patieint has seen dermatology and sees them every 6 months Patient is using sunscreen She is up to date with screenings     Review of Systems   Constitutional: Negative for fatigue, fever and unexpected weight change  HENT: Negative for congestion, sinus pain and trouble swallowing  Eyes: Negative for discharge and visual disturbance  Respiratory: Negative for cough, chest tightness, shortness of breath and wheezing  Cardiovascular: Negative for chest pain, palpitations and leg swelling     Gastrointestinal: Negative for abdominal pain, blood in stool, constipation, diarrhea, nausea and "vomiting  Genitourinary: Negative for difficulty urinating, dysuria, frequency and hematuria  Musculoskeletal: Positive for arthralgias and back pain  Negative for gait problem and joint swelling  Back and hip pain is stable   Skin: Negative for rash and wound  Allergic/Immunologic: Negative for environmental allergies and food allergies  Neurological: Negative for dizziness, syncope, weakness, numbness and headaches  Hematological: Negative for adenopathy  Does not bruise/bleed easily  Psychiatric/Behavioral: Negative for confusion, decreased concentration and sleep disturbance  The patient is not nervous/anxious  Current Outpatient Medications on File Prior to Visit   Medication Sig   • acetaminophen (TYLENOL) 650 mg CR tablet Take 1,300 mg by mouth every 8 (eight) hours as needed for mild pain   • buPROPion (WELLBUTRIN XL) 300 mg 24 hr tablet Take 1 tablet (300 mg total) by mouth daily   • calcium carbonate (OS-STELLA) 600 MG tablet Take 600 mg by mouth 2 (two) times a day with meals PRN   • gabapentin (NEURONTIN) 100 mg capsule TAKE 1 CAPSULE BY MOUTH EVERYDAY AT BEDTIME   • naproxen (NAPROSYN) 500 mg tablet TAKE 1 TABLET BY MOUTH TWICE DAILY WITH FOOD FOR 10 DAYS THEN AS NEEDED       Objective     /78   Ht 5' 3 5\" (1 613 m)   Wt 84 8 kg (187 lb)   BMI 32 61 kg/m²     Physical Exam  Vitals and nursing note reviewed  Constitutional:       Appearance: She is well-developed  She is obese  HENT:      Head: Normocephalic and atraumatic  Right Ear: Hearing, tympanic membrane and external ear normal       Left Ear: Hearing, tympanic membrane and external ear normal    Eyes:      Extraocular Movements: Extraocular movements intact  Conjunctiva/sclera: Conjunctivae normal       Pupils: Pupils are equal, round, and reactive to light  Neck:      Thyroid: No thyromegaly  Cardiovascular:      Rate and Rhythm: Normal rate and regular rhythm        Heart sounds: Normal heart " sounds  Pulmonary:      Effort: Pulmonary effort is normal       Breath sounds: Normal breath sounds  No wheezing or rales  Abdominal:      General: Bowel sounds are normal  There is no distension  Palpations: Abdomen is soft  Tenderness: There is no abdominal tenderness  Musculoskeletal:         General: No tenderness  Cervical back: Neck supple  Lymphadenopathy:      Cervical: No cervical adenopathy  Skin:     General: Skin is warm and dry  Findings: No rash  Neurological:      General: No focal deficit present  Mental Status: She is alert and oriented to person, place, and time  Cranial Nerves: No cranial nerve deficit  Coordination: Coordination normal    Psychiatric:         Mood and Affect: Mood normal          Behavior: Behavior normal          Thought Content:  Thought content normal          Judgment: Judgment normal        Jewell Maldonado DO

## 2023-07-13 DIAGNOSIS — G25.81 RESTLESS LEG: ICD-10-CM

## 2023-07-13 RX ORDER — GABAPENTIN 100 MG/1
100 CAPSULE ORAL
Qty: 30 CAPSULE | Refills: 0 | Status: SHIPPED | OUTPATIENT
Start: 2023-07-13

## 2023-08-30 DIAGNOSIS — G25.81 RESTLESS LEG: ICD-10-CM

## 2023-08-30 RX ORDER — GABAPENTIN 100 MG/1
100 CAPSULE ORAL
Qty: 30 CAPSULE | Refills: 0 | Status: SHIPPED | OUTPATIENT
Start: 2023-08-30

## 2023-09-05 DIAGNOSIS — M51.36 DISC DEGENERATION, LUMBAR: ICD-10-CM

## 2023-09-05 DIAGNOSIS — M54.41 ACUTE RIGHT-SIDED LOW BACK PAIN WITH RIGHT-SIDED SCIATICA: ICD-10-CM

## 2023-09-05 RX ORDER — NAPROXEN 500 MG/1
TABLET ORAL
Qty: 30 TABLET | Refills: 0 | Status: SHIPPED | OUTPATIENT
Start: 2023-09-05

## 2023-09-22 ENCOUNTER — OFFICE VISIT (OUTPATIENT)
Dept: BARIATRICS | Facility: CLINIC | Age: 68
End: 2023-09-22
Payer: MEDICARE

## 2023-09-22 VITALS
HEIGHT: 63 IN | HEART RATE: 71 BPM | DIASTOLIC BLOOD PRESSURE: 75 MMHG | RESPIRATION RATE: 17 BRPM | WEIGHT: 182.4 LBS | SYSTOLIC BLOOD PRESSURE: 118 MMHG | BODY MASS INDEX: 32.32 KG/M2

## 2023-09-22 DIAGNOSIS — E78.2 MIXED HYPERLIPIDEMIA: ICD-10-CM

## 2023-09-22 DIAGNOSIS — E66.9 OBESITY, CLASS I, BMI 30-34.9: Primary | ICD-10-CM

## 2023-09-22 PROCEDURE — 99214 OFFICE O/P EST MOD 30 MIN: CPT | Performed by: NURSE PRACTITIONER

## 2023-09-22 RX ORDER — BUPROPION HYDROCHLORIDE 300 MG/1
300 TABLET ORAL DAILY
Qty: 30 TABLET | Refills: 4 | Status: SHIPPED | OUTPATIENT
Start: 2023-09-22

## 2023-09-22 RX ORDER — NALTREXONE HYDROCHLORIDE 50 MG/1
TABLET, FILM COATED ORAL
Qty: 30 TABLET | Refills: 4 | Status: SHIPPED | OUTPATIENT
Start: 2023-09-22

## 2023-09-22 NOTE — PATIENT INSTRUCTIONS
Continue Wellbutrin  mg daily. Start Naltrexone 50 mg tablet, take 1/2 tablet by mouth once daily in the morning for 2 weeks, then increase to 1/2 tablet twice a day.

## 2023-09-22 NOTE — PROGRESS NOTES
Assessment/Plan:     Obesity, Class I, BMI 30-34.9  - Patient is pursuing Conservative Program  - Initial weight loss goal of 5-10% weight loss for improved health  - Postmenopausal  - Does not have coverage for FDA approved weight loss medications. - She denies history of seizures or glaucoma. - Wellbutrin started 9/15/2022 at weight of 189 lbs. - Continue Wellbutrin  mg daily - tolerating well and helping with appetite. Feeling less stress since starting Wellbutrin. - Feels that weight loss has hit a bit of a plateau. Discussed adding Naltrexone to Wellbutrin to mimic Contrave and she was agreeable. - Start Naltrexone 50 mg, take 1/2 tab by mouth daily for 2 weeks, then increase to 1/2 tab twice a day. - Side effects of naltrexone discussed: nausea, vomiting, diarrhea, constipation, headache, anxiety, and confusion. Advised not to consume alcohol with naltrexone. Must be discontinued prior to surgery. - Labs reviewed: CMP, lipid panel, and A1C 6/2/2023. Chol and LDL elevated, which will likely improve with weight loss. Remainder of the blood work within acceptable range. Initial: 193.5 lbs  Last visit: 187 lbs BMI 32.61  Current: 182.4 lbs BMI 32.31  Change: -12 lbs (-5 lbs since the last office visit)  Goal: 140-150 lbs    Goals:  Restart food logging  1200 calories per day  Keep up the great work with exercise  Keep up the great work with water intake at least 64 oz daily   Continue Wellbutrin   Start Naltrexone    Mixed hyperlipidemia  - May improve with weight loss. Shun Eisenberg was seen today for follow-up. Diagnoses and all orders for this visit:    Obesity, Class I, BMI 30-34.9  -     buPROPion (WELLBUTRIN XL) 300 mg 24 hr tablet; Take 1 tablet (300 mg total) by mouth daily  -     naltrexone (REVIA) 50 mg tablet; Take 1/2 tablet daily in the morning for 2 weeks then increase to 1/2 tablet twice a day.     Mixed hyperlipidemia          Follow up in approximately 2 month nurse visit and 4 months with Non-Surgical Physician/Advanced Practitioner. Subjective:   Chief Complaint   Patient presents with   • Follow-up     MWM 4m f/u; waist-39in       Patient ID: Reyna Falk  is a 80 y.o. female with excess weight/obesity here to pursue weight management. Patient is pursuing Conservative Program.   Most recent notes and records were reviewed. HPI    Wt Readings from Last 10 Encounters:   09/22/23 82.7 kg (182 lb 6.4 oz)   06/12/23 84.8 kg (187 lb)   06/08/23 84.5 kg (186 lb 2.9 oz)   06/02/23 84.8 kg (187 lb)   04/03/23 86.5 kg (190 lb 9.6 oz)   01/11/23 84.1 kg (185 lb 6.4 oz)   12/06/22 83.9 kg (185 lb)   11/09/22 86 kg (189 lb 8 oz)   09/15/22 85.7 kg (189 lb)   07/14/22 87.8 kg (193 lb 8 oz)     Was food logging and getting around [id number]. Eating more organic foods and tumeric to help with inflammation. Taking Wellbutrin  mg daily. No negative side effects. Appetite controlled.      Getting protein with each meal and nuts and dried fruit as snacks.      Hydration: at least 64 oz water, Poppi low sugar sparkling drink, diet green tea  Alcohol: 1 drink per month if that  Smoking: denies  Exercise: walking daily for 20-25 minutes and 3 days per week weight training      Occupation: has own business  Sleep: 6 hours     Colonoscopy: UTD, due 11/2028  Mammogram: UTD, due Jan 2023           The following portions of the patient's history were reviewed and updated as appropriate: allergies, current medications, past family history, past medical history, past social history, past surgical history, and problem list.    Family History   Problem Relation Age of Onset   • Diabetes Mother    • Hypertension Mother    • Diabetes type II Mother         Type 2 diabetes mellitus   • Hypertension Father    • Heart disease Father         Myocardial ischemia   • Abdominal aortic aneurysm Father    • Heart defect Sister    • Stroke Sister    • Heart Valve Disease Sister         patent foramen ovale found    • Clotting disorder Sister    • Mental illness Sister    • Hyperlipidemia Sister    • Cancer Brother         lung   • Diabetes type II Brother         Type 2 diabetes mellitus   • Hypertension Brother    • Hyperlipidemia Brother    • Heart disease Brother    • Diabetes Brother    • Thyroid disease Neg Hx         Review of Systems   HENT: Negative for sore throat. Respiratory: Negative for cough and shortness of breath. Cardiovascular: Negative for chest pain and palpitations. Gastrointestinal: Negative for abdominal pain, constipation, diarrhea, nausea and vomiting. Denies GERD   Musculoskeletal: Positive for arthralgias (arthritis) and back pain (chronic). Skin: Negative for rash. Psychiatric/Behavioral: Negative for suicidal ideas (or HI). Denies depression and anxiety       Objective:  /75   Pulse 71   Resp 17   Ht 5' 3" (1.6 m)   Wt 82.7 kg (182 lb 6.4 oz)   BMI 32.31 kg/m²     Physical Exam  Vitals and nursing note reviewed. Constitutional   General appearance: Abnormal.  well developed and obese. Eyes No conjunctival injection. Ears, Nose, Mouth, and Throat Oral mucosa moist.   Pulmonary   Respiratory effort: No increased work of breathing or signs of respiratory distress. Cardiovascular     Examination of extremities for edema and/or varicosities: Normal.  no edema. Abdomen   Abdomen: Abnormal.  The abdomen was obese.     Musculoskeletal   Normal range of motion  Neurological   Gait and station: Normal.    Psychiatric   Orientation to person, place and time: Normal.    Affect: appropriate

## 2023-09-24 NOTE — ASSESSMENT & PLAN NOTE
- Patient is pursuing Conservative Program  - Initial weight loss goal of 5-10% weight loss for improved health  - Postmenopausal  - Does not have coverage for FDA approved weight loss medications. - She denies history of seizures or glaucoma. - Wellbutrin started 9/15/2022 at weight of 189 lbs. - Continue Wellbutrin  mg daily - tolerating well and helping with appetite. Feeling less stress since starting Wellbutrin. - Feels that weight loss has hit a bit of a plateau. Discussed adding Naltrexone to Wellbutrin to mimic Contrave and she was agreeable. - Start Naltrexone 50 mg, take 1/2 tab by mouth daily for 2 weeks, then increase to 1/2 tab twice a day. - Side effects of naltrexone discussed: nausea, vomiting, diarrhea, constipation, headache, anxiety, and confusion. Advised not to consume alcohol with naltrexone. Must be discontinued prior to surgery. - Labs reviewed: CMP, lipid panel, and A1C 6/2/2023. Chol and LDL elevated, which will likely improve with weight loss. Remainder of the blood work within acceptable range.        Initial: 193.5 lbs  Last visit: 187 lbs BMI 32.61  Current: 182.4 lbs BMI 32.31  Change: -12 lbs (-5 lbs since the last office visit)  Goal: 140-150 lbs    Goals:  Restart food logging  1200 calories per day  Keep up the great work with exercise  Keep up the great work with water intake at least 64 oz daily   Continue Wellbutrin   Start Naltrexone

## 2023-10-24 DIAGNOSIS — G25.81 RESTLESS LEG: ICD-10-CM

## 2023-10-24 RX ORDER — GABAPENTIN 100 MG/1
100 CAPSULE ORAL
Qty: 30 CAPSULE | Refills: 0 | Status: SHIPPED | OUTPATIENT
Start: 2023-10-24

## 2023-11-22 ENCOUNTER — CLINICAL SUPPORT (OUTPATIENT)
Dept: BARIATRICS | Facility: CLINIC | Age: 68
End: 2023-11-22

## 2023-11-22 VITALS
RESPIRATION RATE: 14 BRPM | DIASTOLIC BLOOD PRESSURE: 68 MMHG | WEIGHT: 182.2 LBS | SYSTOLIC BLOOD PRESSURE: 110 MMHG | HEART RATE: 74 BPM | BODY MASS INDEX: 33.53 KG/M2 | HEIGHT: 62 IN

## 2023-11-22 DIAGNOSIS — R63.5 ABNORMAL WEIGHT GAIN: Primary | ICD-10-CM

## 2023-11-22 PROCEDURE — RECHECK

## 2023-11-22 NOTE — PROGRESS NOTES
Patient last visit weight:  Patient current visit weight:    If you are taking phentermine or other oral weight loss medications, are you experiencing any of the following symptoms:  Headache: NO  Blurred Vision: NO  Chest Pain: NO  Palpitations:NO  Insomnia: NO  SPECIFY ORAL MEDICATION AND DOSAGE: Wellbutrin and Naltrexone    If you are taking an injectable medication,  are you experiencing any of the following symptoms:  Bloating:   Nausea:  Vomiting:   Constipation:   Diarrhea:  SPECIFY INJECTABLE MEDICATION AND CURRENT DOSAGE:      Vitals:    Is BP less than 100/60? NO  Is BP greater than 140/90? NO  Is HR greater than 100? NO  **If yes to any of the above, have patient relax and repeat in 5-10 minutes**    Repeat values:    Is BP less than 100/60? Is BP greater than 140/90? Is HR greater than 100?   **If values remain outside of ranges above, please consult provider for next steps**

## 2023-12-12 ENCOUNTER — OFFICE VISIT (OUTPATIENT)
Dept: FAMILY MEDICINE CLINIC | Facility: CLINIC | Age: 68
End: 2023-12-12
Payer: MEDICARE

## 2023-12-12 ENCOUNTER — APPOINTMENT (OUTPATIENT)
Dept: LAB | Facility: MEDICAL CENTER | Age: 68
End: 2023-12-12
Payer: MEDICARE

## 2023-12-12 VITALS
HEART RATE: 98 BPM | OXYGEN SATURATION: 97 % | HEIGHT: 62 IN | DIASTOLIC BLOOD PRESSURE: 70 MMHG | SYSTOLIC BLOOD PRESSURE: 118 MMHG | WEIGHT: 183.2 LBS | BODY MASS INDEX: 33.71 KG/M2

## 2023-12-12 DIAGNOSIS — E66.09 CLASS 1 OBESITY DUE TO EXCESS CALORIES WITH SERIOUS COMORBIDITY AND BODY MASS INDEX (BMI) OF 33.0 TO 33.9 IN ADULT: ICD-10-CM

## 2023-12-12 DIAGNOSIS — Z23 ENCOUNTER FOR IMMUNIZATION: ICD-10-CM

## 2023-12-12 DIAGNOSIS — E78.2 MIXED HYPERLIPIDEMIA: ICD-10-CM

## 2023-12-12 DIAGNOSIS — M51.36 DISC DEGENERATION, LUMBAR: ICD-10-CM

## 2023-12-12 DIAGNOSIS — Z12.31 ENCOUNTER FOR SCREENING MAMMOGRAM FOR BREAST CANCER: ICD-10-CM

## 2023-12-12 DIAGNOSIS — Z88.8 ALLERGY TO STATIN MEDICATION: ICD-10-CM

## 2023-12-12 DIAGNOSIS — Z00.00 MEDICARE ANNUAL WELLNESS VISIT, SUBSEQUENT: Primary | ICD-10-CM

## 2023-12-12 DIAGNOSIS — E78.2 MIXED HYPERLIPIDEMIA: Primary | ICD-10-CM

## 2023-12-12 DIAGNOSIS — G25.81 RESTLESS LEG: ICD-10-CM

## 2023-12-12 DIAGNOSIS — R51.9 NONINTRACTABLE HEADACHE, UNSPECIFIED CHRONICITY PATTERN, UNSPECIFIED HEADACHE TYPE: ICD-10-CM

## 2023-12-12 PROBLEM — E66.811 OBESITY, CLASS I, BMI 30-34.9: Status: RESOLVED | Noted: 2022-07-14 | Resolved: 2023-12-12

## 2023-12-12 PROBLEM — E66.9 OBESITY, CLASS I, BMI 30-34.9: Status: RESOLVED | Noted: 2022-07-14 | Resolved: 2023-12-12

## 2023-12-12 LAB
ALBUMIN SERPL BCP-MCNC: 4.2 G/DL (ref 3.5–5)
ALP SERPL-CCNC: 67 U/L (ref 34–104)
ALT SERPL W P-5'-P-CCNC: 13 U/L (ref 7–52)
ANION GAP SERPL CALCULATED.3IONS-SCNC: 7 MMOL/L
AST SERPL W P-5'-P-CCNC: 18 U/L (ref 13–39)
BILIRUB SERPL-MCNC: 0.45 MG/DL (ref 0.2–1)
BUN SERPL-MCNC: 16 MG/DL (ref 5–25)
CALCIUM SERPL-MCNC: 9 MG/DL (ref 8.4–10.2)
CHLORIDE SERPL-SCNC: 107 MMOL/L (ref 96–108)
CHOLEST SERPL-MCNC: 230 MG/DL
CO2 SERPL-SCNC: 28 MMOL/L (ref 21–32)
CREAT SERPL-MCNC: 0.9 MG/DL (ref 0.6–1.3)
GFR SERPL CREATININE-BSD FRML MDRD: 66 ML/MIN/1.73SQ M
GLUCOSE P FAST SERPL-MCNC: 87 MG/DL (ref 65–99)
HDLC SERPL-MCNC: 57 MG/DL
LDLC SERPL CALC-MCNC: 152 MG/DL (ref 0–100)
NONHDLC SERPL-MCNC: 173 MG/DL
POTASSIUM SERPL-SCNC: 4 MMOL/L (ref 3.5–5.3)
PROT SERPL-MCNC: 6.8 G/DL (ref 6.4–8.4)
SODIUM SERPL-SCNC: 142 MMOL/L (ref 135–147)
TRIGL SERPL-MCNC: 107 MG/DL

## 2023-12-12 PROCEDURE — 90662 IIV NO PRSV INCREASED AG IM: CPT

## 2023-12-12 PROCEDURE — 80053 COMPREHEN METABOLIC PANEL: CPT

## 2023-12-12 PROCEDURE — G0439 PPPS, SUBSEQ VISIT: HCPCS | Performed by: FAMILY MEDICINE

## 2023-12-12 PROCEDURE — G0008 ADMIN INFLUENZA VIRUS VAC: HCPCS

## 2023-12-12 PROCEDURE — 99214 OFFICE O/P EST MOD 30 MIN: CPT | Performed by: FAMILY MEDICINE

## 2023-12-12 PROCEDURE — 80061 LIPID PANEL: CPT

## 2023-12-12 PROCEDURE — 36415 COLL VENOUS BLD VENIPUNCTURE: CPT

## 2023-12-12 RX ORDER — GABAPENTIN 100 MG/1
100 CAPSULE ORAL
Qty: 90 CAPSULE | Refills: 1 | Status: SHIPPED | OUTPATIENT
Start: 2023-12-12

## 2023-12-12 NOTE — ASSESSMENT & PLAN NOTE
Disussed diet She is following [id number] kcal diet Patient is getting 45 minutes of aerobic exercise 2 days per week I have suggested increase of that to 5 days per week

## 2023-12-12 NOTE — ASSESSMENT & PLAN NOTE
Needs to get me copy of living will Patient to  have mammogram in   1/2024 Patient to also keep dermatology followup for her cancer history Flu shot given

## 2023-12-12 NOTE — ASSESSMENT & PLAN NOTE
Lipids were high Will await repeat ASCVD score reviewed If not at goal I will refer to cardiology for lipid management

## 2023-12-12 NOTE — PROGRESS NOTES
Assessment and Plan:     Problem List Items Addressed This Visit          Musculoskeletal and Integument    Disc degeneration, lumbar     Continue gabapentin and follow up in 6 months            Other    Mixed hyperlipidemia     Lipids were high Will await repeat ASCVD score reviewed If not at goal I will refer to cardiology for lipid management          Class 1 obesity due to excess calories with serious comorbidity and body mass index (BMI) of 33.0 to 33.9 in adult     Disussed diet She is following [id number] kcal diet Patient is getting 45 minutes of aerobic exercise 2 days per week I have suggested increase of that to 5 days per week         Restless leg     Stable on meds follow up in 6 months          Relevant Medications    gabapentin (NEURONTIN) 100 mg capsule    Medicare annual wellness visit, subsequent - Primary     Needs to get me copy of living will Patient to  have mammogram in   1/2024 Patient to also keep dermatology followup for her cancer history Flu shot given         Headache     Patient to keep headache diary and headache diet Patient to consider stop of revia as that is a big side effect of that  She will contact me if this continues          Other Visit Diagnoses       Encounter for immunization        Relevant Orders    influenza vaccine, high-dose, PF 0.7 mL (FLUZONE HIGH-DOSE) (Completed)    Encounter for screening mammogram for breast cancer        Relevant Orders    Mammo screening bilateral w 3d & cad             Preventive health issues were discussed with patient, and age appropriate screening tests were ordered as noted in patient's After Visit Summary. Personalized health advice and appropriate referrals for health education or preventive services given if needed, as noted in patient's After Visit Summary.      History of Present Illness:     Patient presents for a Medicare Wellness Visit    Patient is here for medicare wellness and follow up on lumbar disc disease obesity hyperlipidemia restless leg syndrome and also to discuss headaches patient in September late was starte on revia Patient notes that she has not been drinking any alcohol She is following 1200-1600cal diet She lost a total of 10 pounds Patient weight has plateaued Patient had 5 severe headaches that she would call migraines since October Patient ntoes they were associated with light sensitivity and nausea Patient was not aware that revia has side effect of headaches She took excedrin for the headaches and things did improve her blood pressure is fine patient is doing the same with her back pain Her neurontin is helping a lot with back and with her restless legs Patient did not tolerate lipitor of zocor for her cholesterol She had terrible muscle pains I reviewed ASCVD score with her Patient labs today are pending Patient is willing to speak to specialist if her numbers do not improve She needs flu shot and mammogram is due in January        Patient Care Team:  Allison Gage DO as PCP - General     Review of Systems:     Review of Systems   Constitutional:  Negative for fatigue, fever and unexpected weight change. HENT:  Negative for congestion, sinus pain and trouble swallowing. Eyes:  Negative for discharge and visual disturbance. Respiratory:  Negative for cough, chest tightness, shortness of breath and wheezing. Cardiovascular:  Negative for chest pain, palpitations and leg swelling. Gastrointestinal:  Negative for abdominal pain, blood in stool, constipation, diarrhea, nausea and vomiting. Genitourinary:  Negative for difficulty urinating, dysuria, frequency and hematuria. Musculoskeletal:  Negative for arthralgias, gait problem and joint swelling. Skin:  Negative for rash and wound. Allergic/Immunologic: Negative for environmental allergies and food allergies. Neurological:  Positive for headaches. Negative for dizziness, syncope, weakness and numbness.    Hematological:  Negative for adenopathy. Does not bruise/bleed easily. Psychiatric/Behavioral:  Negative for confusion, decreased concentration and sleep disturbance. The patient is not nervous/anxious.          Problem List:     Patient Active Problem List   Diagnosis    Basal cell carcinoma of nose    Disc degeneration, lumbar    Mixed hyperlipidemia    Class 1 obesity due to excess calories with serious comorbidity and body mass index (BMI) of 33.0 to 33.9 in adult    Achilles tendon disorder, left    Restless leg    Asymptomatic postmenopausal state    Family history of abdominal aortic aneurysm (AAA)    Medicare annual wellness visit, subsequent    Headache      Past Medical and Surgical History:     Past Medical History:   Diagnosis Date    Achilles tendinitis of right lower extremity     last assessed - [id number]    Allergic Seasonal    Arthritis Back and hips    Generalized anxiety disorder 06/06/2019    Hyperlipidemia     last assessed - [id number]    Sciatica, left side     last assessed - [id number]     Past Surgical History:   Procedure Laterality Date    COLONOSCOPY      Complete colonoscopy - nml; resolved - [id number]      Family History:     Family History   Problem Relation Age of Onset    Diabetes Mother         After age 62    Hypertension Mother     Diabetes type II Mother         Type 2 diabetes mellitus    Hypertension Father     Heart disease Father         Myocardial ischemia    Abdominal aortic aneurysm Father     Heart defect Sister     Stroke Sister     Heart Valve Disease Sister         patent foramen ovale found     Clotting disorder Sister     Mental illness Sister     Hyperlipidemia Sister     Cancer Brother         Lung CA Smoker    Diabetes type II Brother         Type 2 diabetes mellitus    Hypertension Brother     Hyperlipidemia Brother     Heart disease Brother     Diabetes Brother     Thyroid disease Neg Hx       Social History:     Social History     Socioeconomic History    Marital status: Single     Spouse name: None    Number of children: None    Years of education: None    Highest education level: None   Occupational History    None   Tobacco Use    Smoking status: Never    Smokeless tobacco: Never   Vaping Use    Vaping Use: Never used   Substance and Sexual Activity    Alcohol use: No    Drug use: No    Sexual activity: Not Currently     Partners: Male   Other Topics Concern    None   Social History Narrative    None     Social Determinants of Health     Financial Resource Strain: Low Risk  (12/12/2023)    Overall Financial Resource Strain (CARDIA)     Difficulty of Paying Living Expenses: Not hard at all   Food Insecurity: Not on file   Transportation Needs: No Transportation Needs (12/12/2023)    PRAPARE - Transportation     Lack of Transportation (Medical): No     Lack of Transportation (Non-Medical): No   Physical Activity: Not on file   Stress: Not on file   Social Connections: Not on file   Intimate Partner Violence: Not on file   Housing Stability: Not on file      Medications and Allergies:     Current Outpatient Medications   Medication Sig Dispense Refill    acetaminophen (TYLENOL) 650 mg CR tablet Take 1,300 mg by mouth as needed for mild pain      buPROPion (WELLBUTRIN XL) 300 mg 24 hr tablet Take 1 tablet (300 mg total) by mouth daily 30 tablet 4    calcium carbonate (OS-STELLA) 600 MG tablet Take 600 mg by mouth 2 (two) times a day with meals PRN      gabapentin (NEURONTIN) 100 mg capsule Take 1 capsule (100 mg total) by mouth daily at bedtime 90 capsule 1    naltrexone (REVIA) 50 mg tablet Take 1/2 tablet daily in the morning for 2 weeks then increase to 1/2 tablet twice a day. 30 tablet 4    naproxen (NAPROSYN) 500 mg tablet TAKE 1 TABLET BY MOUTH TWICE DAILY WITH FOOD FOR 10 DAYS THEN AS NEEDED (Patient taking differently: as needed TAKE 1 TABLET BY MOUTH TWICE DAILY WITH FOOD FOR 10 DAYS THEN AS NEEDED) 30 tablet 0     No current facility-administered medications for this visit.      Allergies Allergen Reactions    Statins Myalgia      Immunizations:     Immunization History   Administered Date(s) Administered    COVID-19 MODERNA VACC 0.5 ML IM 04/12/2021, 05/12/2021, 12/08/2021    INFLUENZA 11/12/2019, 10/22/2020, 10/29/2021, 12/06/2022    Influenza Quadrivalent, 6-35 Months IM 12/14/2016, 10/03/2017    Influenza, high dose seasonal 0.7 mL 10/29/2021, 12/06/2022, 12/12/2023    Influenza, recombinant, quadrivalent,injectable, preservative free 10/30/2018, 11/12/2019, 10/22/2020    Pneumococcal Conjugate 13-Valent 12/14/2016    Pneumococcal Polysaccharide PPV23 10/29/2021    Tdap 11/27/2017    Zoster 11/27/2017      Health Maintenance:         Topic Date Due    Breast Cancer Screening: Mammogram  01/10/2024    Colorectal Cancer Screening  11/29/2028    Hepatitis C Screening  Completed         Topic Date Due    COVID-19 Vaccine (4 - Hershall Pako series) 02/02/2022      Medicare Screening Tests and Risk Assessments:     Enzo Baron is here for her Subsequent Wellness visit. Health Risk Assessment:   Patient rates overall health as good. Patient feels that their physical health rating is slightly worse. Patient is very satisfied with their life. Eyesight was rated as slightly worse. Hearing was rated as same. Patient feels that their emotional and mental health rating is same. Patients states they are never, rarely angry. Patient states they are sometimes unusually tired/fatigued. Pain experienced in the last 7 days has been some. Patient's pain rating has been 4/10. Patient states that she has experienced no weight loss or gain in last 6 months. Started getting migraine like headaches since Late October, approximately 5 last one 12/8. Fall Risk Screening: In the past year, patient has experienced: no history of falling in past year      Urinary Incontinence Screening:   Patient has not leaked urine accidently in the last six months.      Home Safety:  Patient does not have trouble with stairs inside or outside of their home. Patient has working smoke alarms and has no working carbon monoxide detector. Home safety hazards include: none. Needs a carbon monoxide detector    Nutrition:   Current diet is Low Cholesterol, Low Saturated Fat and Low Carb. Medications:   Patient is currently taking over-the-counter supplements. OTC medications include: see medication list. Patient is able to manage medications. Activities of Daily Living (ADLs)/Instrumental Activities of Daily Living (IADLs):   Walk and transfer into and out of bed and chair?: Yes  Dress and groom yourself?: Yes    Bathe or shower yourself?: Yes    Feed yourself? Yes  Do your laundry/housekeeping?: Yes  Manage your money, pay your bills and track your expenses?: Yes  Make your own meals?: Yes    Do your own shopping?: Yes    Previous Hospitalizations:   Any hospitalizations or ED visits within the last 12 months?: No      Advance Care Planning:   Living will: Yes    Durable POA for healthcare:  Yes    Advanced directive: Yes    Provider agrees with end of life decisions: Yes      Cognitive Screening:   Provider or family/friend/caregiver concerned regarding cognition?: No    PREVENTIVE SCREENINGS      Cardiovascular Screening:    General: Screening Not Indicated and History Lipid Disorder      Diabetes Screening:     General: Screening Current      Colorectal Cancer Screening:     General: Screening Current      Breast Cancer Screening:     General: Screening Current      Cervical Cancer Screening:    General: Screening Not Indicated      Abdominal Aortic Aneurysm (AAA) Screening:    Risk factors include: family history of AAA        Lung Cancer Screening:     General: Screening Not Indicated      Hepatitis C Screening:    General: Screening Current    Screening, Brief Intervention, and Referral to Treatment (SBIRT)    Screening  Typical number of drinks in a day: 0  Typical number of drinks in a week: 0  Interpretation: Low risk drinking behavior. AUDIT-C Screenin) How often did you have a drink containing alcohol in the past year? monthly or less  2) How many drinks did you have on a typical day when you were drinking in the past year? 1 to 2  3) How often did you have 6 or more drinks on one occasion in the past year? never    AUDIT-C Score: 1  Interpretation: Score 0-2 (female): Negative screen for alcohol misuse    Single Item Drug Screening:  How often have you used an illegal drug (including marijuana) or a prescription medication for non-medical reasons in the past year? never    Single Item Drug Screen Score: 0  Interpretation: Negative screen for possible drug use disorder    No results found. Physical Exam:     /70 (BP Location: Left arm, Patient Position: Sitting, Cuff Size: Adult)   Pulse 98   Ht 5' 2" (1.575 m)   Wt 83.1 kg (183 lb 3.2 oz)   SpO2 97%   BMI 33.51 kg/m²     Physical Exam  Vitals and nursing note reviewed. Constitutional:       Appearance: She is well-developed. She is obese. HENT:      Head: Normocephalic and atraumatic. Right Ear: Tympanic membrane and external ear normal.      Left Ear: Tympanic membrane and external ear normal.      Nose: Nose normal.      Mouth/Throat:      Pharynx: No oropharyngeal exudate. Eyes:      Extraocular Movements: Extraocular movements intact. Conjunctiva/sclera: Conjunctivae normal.      Pupils: Pupils are equal, round, and reactive to light. Neck:      Thyroid: No thyromegaly. Trachea: No tracheal deviation. Cardiovascular:      Rate and Rhythm: Normal rate and regular rhythm. Heart sounds: Normal heart sounds. Pulmonary:      Effort: Pulmonary effort is normal. No respiratory distress. Breath sounds: Normal breath sounds. No wheezing or rales. Abdominal:      General: Bowel sounds are normal.      Palpations: Abdomen is soft. Musculoskeletal:         General: Normal range of motion.       Cervical back: Normal range of motion and neck supple. Lymphadenopathy:      Cervical: No cervical adenopathy. Skin:     General: Skin is warm. Findings: No rash. Neurological:      General: No focal deficit present. Mental Status: She is alert and oriented to person, place, and time. Cranial Nerves: No cranial nerve deficit. Motor: No abnormal muscle tone. Psychiatric:         Mood and Affect: Mood normal.         Behavior: Behavior normal.         Thought Content:  Thought content normal.         Judgment: Judgment normal.          Crystal Spotted, DO

## 2023-12-12 NOTE — ASSESSMENT & PLAN NOTE
Patient to keep headache diary and headache diet Patient to consider stop of revia as that is a big side effect of that  She will contact me if this continues

## 2023-12-12 NOTE — PROGRESS NOTES
Name: Annamaria Jasso      : 1955      MRN: [id number]  Encounter Provider: Jewel Carrasco DO  Encounter Date: 2023   Encounter department: [facility] PRIMARY CARE    Assessment & Plan     1. Encounter for immunization    2. Encounter for screening mammogram for breast cancer      Chief Complaint   Patient presents with    Medicare Wellness Visit           Subjective      HPI  Review of Systems    Current Outpatient Medications on File Prior to Visit   Medication Sig    acetaminophen (TYLENOL) 650 mg CR tablet Take 1,300 mg by mouth as needed for mild pain    buPROPion (WELLBUTRIN XL) 300 mg 24 hr tablet Take 1 tablet (300 mg total) by mouth daily    calcium carbonate (OS-STELLA) 600 MG tablet Take 600 mg by mouth 2 (two) times a day with meals PRN    gabapentin (NEURONTIN) 100 mg capsule Take 1 capsule (100 mg total) by mouth daily at bedtime    naltrexone (REVIA) 50 mg tablet Take 1/2 tablet daily in the morning for 2 weeks then increase to 1/2 tablet twice a day.     naproxen (NAPROSYN) 500 mg tablet TAKE 1 TABLET BY MOUTH TWICE DAILY WITH FOOD FOR 10 DAYS THEN AS NEEDED (Patient taking differently: as needed TAKE 1 TABLET BY MOUTH TWICE DAILY WITH FOOD FOR 10 DAYS THEN AS NEEDED)       Objective     /70 (BP Location: Left arm, Patient Position: Sitting, Cuff Size: Adult)   Pulse 98   Ht 5' 2" (1.575 m)   Wt 83.1 kg (183 lb 3.2 oz)   SpO2 97%   BMI 33.51 kg/m²     Physical Exam  Jewel Carrasco DO    Answers submitted by the patient for this visit:  Medicare Annual Wellness Visit (Submitted on 2023)  How would you rate your overall health?: good  Compared to last year, how is your physical health?: slightly worse  In general, how satisfied are you with your life?: very satisfied  Compared to last year, how is your eyesight?: slightly worse  Compared to last year, how is your hearing?: same  Compared to last year, how is your emotional/mental health?: same  How often is anger a problem for you?: never, rarely  How often do you feel unusually tired/fatigued?: sometimes  In the past 7 days, how much pain have you experienced?: some  If you answered "some" or "a lot", please rate the severity of your pain on a scale of 1 to 10 (1 being the least severe pain and 10 being the most intense pain). : 4/10  In the past 6 months, have you lost or gained 10 pounds without trying?: No  Additional Comments: Started getting migraine like headaches since Late October, approximately 5 last one 12/8. One or more falls in the last year: No  In the past 6 months, have you accidentally leaked urine?: No  Do you have trouble with the stairs inside or outside your home?: No  Does your home have working smoke alarms?: Yes  Does your home have a carbon monoxide monitor?: No  Which safety hazards (if any) have you experienced in your home? Please select all that apply.: none  How would you describe your current diet?  Please select all that apply.: Low Cholesterol, Low Saturated Fat, Low Carb  In addition to prescription medications, are you taking any over-the-counter supplements?: Yes  If yes, what supplements are you taking?: Excedrin Zyrtec tylenol sinus  Can you manage your medications?: Yes  Are you currently taking any opioid medications?: No  Can you walk and transfer into and out of your bed and chair?: Yes  Can you dress and groom yourself?: Yes  Can you bathe or shower yourself?: Yes  Can you feed yourself?: Yes  Can you do your laundry/ housekeeping?: Yes  Can you manage your money, pay your bills, and track your expenses?: Yes  Can you make your own meals?: Yes  Can you do your own shopping?: Yes  Within the last 12 months, have you had any hospitalizations or Emergency Department visits?: No  Do you have a living will?: Yes  Do you have a Durable POA (Power of ) for healthcare decisions?: Yes  Do you have an Advanced Directive for end of life decisions?: Yes  How often have you used an illegal drug (including marijuana) or a prescription medication for non-medical reasons in the past year?: never  What is the typical number of drinks you consume in a day?: 0  What is the typical number of drinks you consume in a week?: 0  How often did you have a drink containing alcohol in the past year?: monthly or less  How many drinks did you have on a typical day  when you were drinking in the past year?: 1 to 2  How often did you have 6 or more drinks on one occasion in the past year?: never

## 2024-01-29 DIAGNOSIS — Z12.31 ENCOUNTER FOR SCREENING MAMMOGRAM FOR BREAST CANCER: ICD-10-CM

## 2024-02-10 PROBLEM — Z00.00 MEDICARE ANNUAL WELLNESS VISIT, SUBSEQUENT: Status: RESOLVED | Noted: 2022-12-06 | Resolved: 2024-02-10

## 2024-04-19 ENCOUNTER — TELEPHONE (OUTPATIENT)
Age: 69
End: 2024-04-19

## 2024-04-19 DIAGNOSIS — E78.2 MIXED HYPERLIPIDEMIA: Primary | ICD-10-CM

## 2024-04-25 ENCOUNTER — APPOINTMENT (OUTPATIENT)
Dept: LAB | Facility: MEDICAL CENTER | Age: 69
End: 2024-04-25

## 2024-04-25 DIAGNOSIS — E78.2 MIXED HYPERLIPIDEMIA: ICD-10-CM

## 2024-05-13 ENCOUNTER — EVALUATION (OUTPATIENT)
Dept: PHYSICAL THERAPY | Facility: MEDICAL CENTER | Age: 69
End: 2024-05-13
Payer: MEDICARE

## 2024-05-13 DIAGNOSIS — M25.551 RIGHT HIP PAIN: Primary | ICD-10-CM

## 2024-05-13 PROCEDURE — 97140 MANUAL THERAPY 1/> REGIONS: CPT | Performed by: PHYSICAL THERAPIST

## 2024-05-13 PROCEDURE — 97161 PT EVAL LOW COMPLEX 20 MIN: CPT | Performed by: PHYSICAL THERAPIST

## 2024-05-13 PROCEDURE — 97110 THERAPEUTIC EXERCISES: CPT | Performed by: PHYSICAL THERAPIST

## 2024-05-13 NOTE — LETTER
May 15, 2024    Camila Sepulveda DO  [facility] [address]    Patient: Kaya Connor   YOB: 1955   Date of Visit: 2024     Encounter Diagnosis     ICD-10-CM    1. Right hip pain  M25.551           Dear Dr. Sepulveda:    Thank you for your recent referral of Kaya Connor. Please review the attached evaluation summary from Kaya's recent visit.     Please verify that you agree with the plan of care by signing the attached order.     If you have any questions or concerns, please do not hesitate to call.     I sincerely appreciate the opportunity to share in the care of one of your patients and hope to have another opportunity to work with you in the near future.       Sincerely,    Liz Noland, PT      Referring Provider:      I certify that I have read the below Plan of Care and certify the need for these services furnished under this plan of treatment while under my care.                    Camila Sepulveda DO  [address] [facility] [address]  Via Fax: [phone]          PT Evaluation     Today's date: 2024  Patient name: Kaya Connor  : 1955  MRN: [id number]  Referring provider: Camila Sepulveda DO  Dx:   Encounter Diagnosis   Name Primary?   • Right hip pain Yes                  Assessment  Assessment details: Kaya Connor is a 68 y/o female who presents with complaints of acute on chronic R hip pain.  The patient's greatest concern is her ability to remain active.  Primary movement impairment diagnosis of R hip hypomobility, resulting in pathoanatomical symptoms of R hip pain, which limits her ability to perform functional activities without pain.  Pt. will benefit from skilled PT services that includes manual therapy techniques to enhance tissue extensibility, neuromuscular re-education to facilitate motor control, therapeutic exercise to increase functional mobility, and modalities prn to reduce pain and inflammation.   Impairments: abnormal gait, abnormal  "muscle firing, abnormal or restricted ROM, activity intolerance, impaired physical strength, lacks appropriate home exercise program, pain with function and weight-bearing intolerance  Understanding of Dx/Px/POC: good   Prognosis: good    Goals  Impairment Goals  - Pt I with initial HEP in 1-2 visits  - Improve ROM to WNL in 4-6 weeks  - Increase strength to 5/5 in all affected areas in 4-6 weeks    Functional Goals  - Increase Functional Status Measure to: goal status in 6-8 weeks  - Patient will be independent with comprehensive HEP in 6-8 weeks  - Patient will report decreased pain at time of discharge       Plan  Plan details: 6-8 weeks  Patient would benefit from: PT eval  Planned modality interventions: thermotherapy: hydrocollator packs  Planned therapy interventions: joint mobilization, manual therapy, neuromuscular re-education, patient education, strengthening, stretching, therapeutic activities, therapeutic exercise, home exercise program, graded exercise, graded activity, flexibility, abdominal trunk stabilization and balance/weight bearing training  Frequency: 1-2x/week.  Treatment plan discussed with: patient    Subjective Evaluation    History of Present Illness  Mechanism of injury: Patient reports a hx of back and hip pain.  She has been going to [employer] 2-3x/week for personal training.  She has been seeing a chiropractor.  Decompression on her spine has been helping.  Her right hip started bothering her about 6 weeks ago after \"overdoing it\" at the gym.  She notes that sitting is bothersome. She recently traveled to [place] and sitting in the car increased symptoms.  Patient went to see Dr. Sepulveda who referred her to an orthopedic surgeon at [facility].  X-rays revealed R hip OA.  She is taking Naproxen, Gabapentin, and Tylenol arthritis.  She has an appointment scheduled with an orthopedic surgeon at the end of August to discuss future KENDRICK.  She would like to be able to manage the " right hip pain without sx for as long as possible.  Patient's goals are to have decreased pain, increase strength, and continue to be active.  Patient Goals  Patient goals for therapy: decreased pain and increased strength    Pain  Current pain ratin  At best pain ratin  At worst pain ratin  Quality: nagging/grabbing/annoying.  Relieving factors: medications  Aggravating factors: sitting      Diagnostic Tests  Abnormal x-ray: R hip OA.  Treatments  Current treatment: physical therapy      Objective     Lumbar Screen  Lumbar range of motion within normal limits with the following exceptions:Mildly limited lumbar ROM throughout.    Neurological Testing     Sensation     Hip   Left Hip   Intact: light touch    Right Hip   Intact: light touch    Reflexes   Left   Patellar (L4): normal (2+)  Achilles (S1): normal (2+)    Right   Patellar (L4): normal (2+)  Achilles (S1): normal (2+)    Passive Range of Motion   Left Hip   Normal passive range of motion    Right Hip   Flexion: 110 degrees   Abduction: 30 degrees     Additional Passive Range of Motion Details  Limited R hip ER and IR c/ PROM    Joint Play   Left Hip   Joints within functional limits are the posterior hip capsule, anterior hip capsule, lateral hip capsule and long axis distraction.     Right Hip     Hypomobile in the posterior hip capsule, anterior hip capsule, lateral hip capsule and long axis distraction    Strength/Myotome Testing     Left Hip   Planes of Motion   Flexion: 5  Extension: 4-  Abduction: 4  Adduction: 4-    Isolated Muscles   Gluteus rao: 4-  Gluteus medius: 4-  Iliopsoas: 4    Right Hip   Planes of Motion   Flexion: 3-  Extension: 3-  Abduction: 3+  Adduction: 3-    Isolated Muscles   Gluteus maximums: 3-  Gluteus medius: 3+  Iliopsoas: 3+    Tests     Right Hip   Positive SONIA and long sit.   Negative FADIR.   90/90 SLR: Positive.     Additional Tests Details  (+) Functional leg length discrepancy c/ R LE shorter           Precautions:  None      Manuals 5/13            R hip long axis distraction AZ Grade V            R hip mobs c/ belt (lateral distraction, ER, IR) AZ                                      Neuro Re-Ed             TA isometrics 2x10 5s                                                   Ther Ex                          Hip add 2x10 5s            Hip abd 2x10 5s                                                                             Ther Activity                                       Gait Training                                       Modalities                                             Liz Noland, PT  5/13/2024,12:28 PM

## 2024-05-13 NOTE — PROGRESS NOTES
PT Evaluation     Today's date: 2024  Patient name: Kaya Connor  : 1955  MRN: [id number]  Referring provider: Camila Sepulveda DO  Dx:   Encounter Diagnosis   Name Primary?    Right hip pain Yes                  Assessment  Assessment details: Kaya Connor is a 68 y/o female who presents with complaints of acute on chronic R hip pain.  The patient's greatest concern is her ability to remain active.  Primary movement impairment diagnosis of R hip hypomobility, resulting in pathoanatomical symptoms of R hip pain, which limits her ability to perform functional activities without pain.  Pt. will benefit from skilled PT services that includes manual therapy techniques to enhance tissue extensibility, neuromuscular re-education to facilitate motor control, therapeutic exercise to increase functional mobility, and modalities prn to reduce pain and inflammation.   Impairments: abnormal gait, abnormal muscle firing, abnormal or restricted ROM, activity intolerance, impaired physical strength, lacks appropriate home exercise program, pain with function and weight-bearing intolerance  Understanding of Dx/Px/POC: good   Prognosis: good    Goals  Impairment Goals  - Pt I with initial HEP in 1-2 visits  - Improve ROM to WNL in 4-6 weeks  - Increase strength to 5/5 in all affected areas in 4-6 weeks    Functional Goals  - Increase Functional Status Measure to: goal status in 6-8 weeks  - Patient will be independent with comprehensive HEP in 6-8 weeks  - Patient will report decreased pain at time of discharge       Plan  Plan details: 6-8 weeks  Patient would benefit from: PT eval  Planned modality interventions: thermotherapy: hydrocollator packs  Planned therapy interventions: joint mobilization, manual therapy, neuromuscular re-education, patient education, strengthening, stretching, therapeutic activities, therapeutic exercise, home exercise program, graded exercise, graded activity, flexibility, abdominal trunk  "stabilization and balance/weight bearing training  Frequency: 1-2x/week.  Treatment plan discussed with: patient    Subjective Evaluation    History of Present Illness  Mechanism of injury: Patient reports a hx of back and hip pain.  She has been going to [employer] and [employer] 2-3x/week for personal training.  She has been seeing a chiropractor.  Decompression on her spine has been helping.  Her right hip started bothering her about 6 weeks ago after \"overdoing it\" at the gym.  She notes that sitting is bothersome. She recently traveled to [place] and sitting in the car increased symptoms.  Patient went to see Dr. Sepulveda who referred her to an orthopedic surgeon at [facility].  X-rays revealed R hip OA.  She is taking Naproxen, Gabapentin, and Tylenol arthritis.  She has an appointment scheduled with an orthopedic surgeon at the end of August to discuss future KENDRICK.  She would like to be able to manage the right hip pain without sx for as long as possible.  Patient's goals are to have decreased pain, increase strength, and continue to be active.  Patient Goals  Patient goals for therapy: decreased pain and increased strength    Pain  Current pain ratin  At best pain ratin  At worst pain ratin  Quality: nagging/grabbing/annoying.  Relieving factors: medications  Aggravating factors: sitting      Diagnostic Tests  Abnormal x-ray: R hip OA.  Treatments  Current treatment: physical therapy      Objective     Lumbar Screen  Lumbar range of motion within normal limits with the following exceptions:Mildly limited lumbar ROM throughout.    Neurological Testing     Sensation     Hip   Left Hip   Intact: light touch    Right Hip   Intact: light touch    Reflexes   Left   Patellar (L4): normal (2+)  Achilles (S1): normal (2+)    Right   Patellar (L4): normal (2+)  Achilles (S1): normal (2+)    Passive Range of Motion   Left Hip   Normal passive range of motion    Right Hip   Flexion: 110 degrees   Abduction: 30 " degrees     Additional Passive Range of Motion Details  Limited R hip ER and IR c/ PROM    Joint Play   Left Hip   Joints within functional limits are the posterior hip capsule, anterior hip capsule, lateral hip capsule and long axis distraction.     Right Hip     Hypomobile in the posterior hip capsule, anterior hip capsule, lateral hip capsule and long axis distraction    Strength/Myotome Testing     Left Hip   Planes of Motion   Flexion: 5  Extension: 4-  Abduction: 4  Adduction: 4-    Isolated Muscles   Gluteus rao: 4-  Gluteus medius: 4-  Iliopsoas: 4    Right Hip   Planes of Motion   Flexion: 3-  Extension: 3-  Abduction: 3+  Adduction: 3-    Isolated Muscles   Gluteus maximums: 3-  Gluteus medius: 3+  Iliopsoas: 3+    Tests     Right Hip   Positive SONIA and long sit.   Negative FADIR.   90/90 SLR: Positive.     Additional Tests Details  (+) Functional leg length discrepancy c/ R LE shorter          Precautions:  None      Manuals 5/13            R hip long axis distraction AZ Grade V            R hip mobs c/ belt (lateral distraction, ER, IR) AZ                                      Neuro Re-Ed             TA isometrics 2x10 5s                                                   Ther Ex                          Hip add 2x10 5s            Hip abd 2x10 5s                                                                             Ther Activity                                       Gait Training                                       Modalities                                             Liz Noland, PT  5/13/2024,12:28 PM

## 2024-05-15 ENCOUNTER — TELEPHONE (OUTPATIENT)
Age: 69
End: 2024-05-15

## 2024-05-15 NOTE — TELEPHONE ENCOUNTER
Patient would like the PCP to add Thyroid testing to her labs before she gets it done.  Please call when added. [phone]

## 2024-05-16 DIAGNOSIS — E78.2 MIXED HYPERLIPIDEMIA: Primary | ICD-10-CM

## 2024-05-16 DIAGNOSIS — E66.09 CLASS 1 OBESITY DUE TO EXCESS CALORIES WITH SERIOUS COMORBIDITY AND BODY MASS INDEX (BMI) OF 33.0 TO 33.9 IN ADULT: ICD-10-CM

## 2024-05-21 ENCOUNTER — OFFICE VISIT (OUTPATIENT)
Dept: PHYSICAL THERAPY | Facility: MEDICAL CENTER | Age: 69
End: 2024-05-21
Payer: MEDICARE

## 2024-05-21 ENCOUNTER — APPOINTMENT (OUTPATIENT)
Dept: LAB | Facility: MEDICAL CENTER | Age: 69
End: 2024-05-21
Payer: MEDICARE

## 2024-05-21 ENCOUNTER — TELEPHONE (OUTPATIENT)
Age: 69
End: 2024-05-21

## 2024-05-21 DIAGNOSIS — M25.551 RIGHT HIP PAIN: Primary | ICD-10-CM

## 2024-05-21 DIAGNOSIS — E78.2 MIXED HYPERLIPIDEMIA: ICD-10-CM

## 2024-05-21 DIAGNOSIS — E78.2 MIXED HYPERLIPIDEMIA: Primary | ICD-10-CM

## 2024-05-21 DIAGNOSIS — Z88.8 ALLERGY TO STATIN MEDICATION: ICD-10-CM

## 2024-05-21 DIAGNOSIS — E66.09 CLASS 1 OBESITY DUE TO EXCESS CALORIES WITH SERIOUS COMORBIDITY AND BODY MASS INDEX (BMI) OF 33.0 TO 33.9 IN ADULT: ICD-10-CM

## 2024-05-21 LAB
ALBUMIN SERPL BCP-MCNC: 4.2 G/DL (ref 3.5–5)
ALP SERPL-CCNC: 52 U/L (ref 34–104)
ALT SERPL W P-5'-P-CCNC: 17 U/L (ref 7–52)
ANION GAP SERPL CALCULATED.3IONS-SCNC: 10 MMOL/L (ref 4–13)
AST SERPL W P-5'-P-CCNC: 19 U/L (ref 13–39)
BILIRUB SERPL-MCNC: 0.59 MG/DL (ref 0.2–1)
BUN SERPL-MCNC: 22 MG/DL (ref 5–25)
CALCIUM SERPL-MCNC: 9.1 MG/DL (ref 8.4–10.2)
CHLORIDE SERPL-SCNC: 107 MMOL/L (ref 96–108)
CHOLEST SERPL-MCNC: 219 MG/DL
CO2 SERPL-SCNC: 26 MMOL/L (ref 21–32)
CREAT SERPL-MCNC: 0.94 MG/DL (ref 0.6–1.3)
GFR SERPL CREATININE-BSD FRML MDRD: 62 ML/MIN/1.73SQ M
GLUCOSE P FAST SERPL-MCNC: 94 MG/DL (ref 65–99)
HDLC SERPL-MCNC: 48 MG/DL
LDLC SERPL CALC-MCNC: 146 MG/DL (ref 0–100)
NONHDLC SERPL-MCNC: 171 MG/DL
POTASSIUM SERPL-SCNC: 3.8 MMOL/L (ref 3.5–5.3)
PROT SERPL-MCNC: 6.8 G/DL (ref 6.4–8.4)
SODIUM SERPL-SCNC: 143 MMOL/L (ref 135–147)
TRIGL SERPL-MCNC: 123 MG/DL
TSH SERPL DL<=0.05 MIU/L-ACNC: 1.64 UIU/ML (ref 0.45–4.5)

## 2024-05-21 PROCEDURE — 80061 LIPID PANEL: CPT

## 2024-05-21 PROCEDURE — 97140 MANUAL THERAPY 1/> REGIONS: CPT | Performed by: PHYSICAL THERAPIST

## 2024-05-21 PROCEDURE — 97112 NEUROMUSCULAR REEDUCATION: CPT | Performed by: PHYSICAL THERAPIST

## 2024-05-21 PROCEDURE — 36415 COLL VENOUS BLD VENIPUNCTURE: CPT

## 2024-05-21 PROCEDURE — 80053 COMPREHEN METABOLIC PANEL: CPT

## 2024-05-21 PROCEDURE — 84443 ASSAY THYROID STIM HORMONE: CPT

## 2024-05-21 PROCEDURE — 97110 THERAPEUTIC EXERCISES: CPT | Performed by: PHYSICAL THERAPIST

## 2024-05-21 NOTE — TELEPHONE ENCOUNTER
Patient called to confirm a referral for cardiology had been placed in her chart. Patient advised.

## 2024-05-21 NOTE — PROGRESS NOTES
Daily Note     Today's date: 2024  Patient name: Kaya Connor  : 1955  MRN: [id number]  Referring provider: Camila Sepulveda DO  Dx:   Encounter Diagnosis     ICD-10-CM    1. Right hip pain  M25.551                      Subjective:  Patient states that she is doing okay.      Objective: See treatment diary below.      Assessment:  Patient demonstrates good tolerance to manuals and exercise progressions.  Tolerated treatment well. Patient would benefit from continued PT.      Plan: Continue per plan of care.      Precautions:  None      Manuals            R hip long axis distraction AZ Grade V AZ Grade IV           R hip mobs c/ belt (lateral distraction, ER, IR) AZ AZ           STM/MFR  AZ                        Neuro Re-Ed             TA isometrics 2x10 5s 2x10 5s                                                  Ther Ex                          Hip add 2x10 5s 2x10 5s           Hip abd 2x10 5s 2x10 blue           Bridges  2x10           Clams  2x10 5s                                                  Ther Activity                                       Gait Training                                       Modalities               10'

## 2024-05-23 ENCOUNTER — OFFICE VISIT (OUTPATIENT)
Dept: PHYSICAL THERAPY | Facility: MEDICAL CENTER | Age: 69
End: 2024-05-23
Payer: MEDICARE

## 2024-05-23 DIAGNOSIS — M25.551 RIGHT HIP PAIN: Primary | ICD-10-CM

## 2024-05-23 PROCEDURE — 97110 THERAPEUTIC EXERCISES: CPT | Performed by: PHYSICAL THERAPIST

## 2024-05-23 PROCEDURE — 97112 NEUROMUSCULAR REEDUCATION: CPT | Performed by: PHYSICAL THERAPIST

## 2024-05-23 PROCEDURE — 97140 MANUAL THERAPY 1/> REGIONS: CPT | Performed by: PHYSICAL THERAPIST

## 2024-05-23 NOTE — PROGRESS NOTES
Daily Note     Today's date: 2024  Patient name: Kaya Connor  : 1955  MRN: [id number]  Referring provider: Camila Sepulveda DO  Dx:   Encounter Diagnosis     ICD-10-CM    1. Right hip pain  M25.551                      Subjective: Patient states that she is doing well.      Objective: See treatment diary below.      Assessment:  Patient demonstrates good tolerance to progressions without pain.  Tolerated treatment well. Patient would benefit from continued PT.      Plan: Continue per plan of care.      Precautions:  None      Manuals           R hip long axis distraction AZ Grade V AZ Grade IV AZ Grade IV          R hip mobs c/ belt (lateral distraction, ER, IR) AZ AZ AZ          STM/MFR  AZ AZ          Iliopsoas/iliacus release             Neuro Re-Ed             TA isometrics 2x10 5s 2x10 5s 3x10 5s                                                 Ther Ex                          Hip add 2x10 5s 2x10 5s 3x10 5s           Hip abd 2x10 5s 2x10 blue 3x10 5s blue          Bridges  2x10 3x10          Clams  2x10 5s 3x10 5s                                                 Ther Activity                                       Gait Training                                       Modalities               10' 10

## 2024-05-28 ENCOUNTER — APPOINTMENT (OUTPATIENT)
Dept: PHYSICAL THERAPY | Facility: MEDICAL CENTER | Age: 69
End: 2024-05-28
Payer: MEDICARE

## 2024-05-30 ENCOUNTER — APPOINTMENT (OUTPATIENT)
Dept: PHYSICAL THERAPY | Facility: MEDICAL CENTER | Age: 69
End: 2024-05-30
Payer: MEDICARE

## 2024-05-31 DIAGNOSIS — G25.81 RESTLESS LEG: ICD-10-CM

## 2024-05-31 RX ORDER — GABAPENTIN 100 MG/1
100 CAPSULE ORAL
Qty: 90 CAPSULE | Refills: 1 | Status: SHIPPED | OUTPATIENT
Start: 2024-05-31

## 2024-06-04 ENCOUNTER — OFFICE VISIT (OUTPATIENT)
Dept: PHYSICAL THERAPY | Facility: MEDICAL CENTER | Age: 69
End: 2024-06-04
Payer: MEDICARE

## 2024-06-04 DIAGNOSIS — M25.551 RIGHT HIP PAIN: Primary | ICD-10-CM

## 2024-06-04 PROCEDURE — 97110 THERAPEUTIC EXERCISES: CPT | Performed by: PHYSICAL THERAPIST

## 2024-06-04 PROCEDURE — 97112 NEUROMUSCULAR REEDUCATION: CPT | Performed by: PHYSICAL THERAPIST

## 2024-06-04 PROCEDURE — 97140 MANUAL THERAPY 1/> REGIONS: CPT | Performed by: PHYSICAL THERAPIST

## 2024-06-04 NOTE — PROGRESS NOTES
Daily Note     Today's date: 2024  Patient name: Kaya Connor  : 1955  MRN: [id number]  Referring provider: Camila Sepulveda DO  Dx:   Encounter Diagnosis     ICD-10-CM    1. Right hip pain  M25.551                      Subjective:  Patient states that she is doing well.      Objective: See treatment diary below.      Assessment: Tolerated treatment well. Patient would benefit from continued PT.      Plan: Continue per plan of care.      Precautions:  None      Manuals          R hip long axis distraction AZ Grade V AZ Grade IV AZ Grade IV AZ Grade V         R hip mobs c/ belt (lateral distraction, ER, IR) AZ AZ AZ AZ         STM/MFR  AZ AZ AZ         Iliopsoas/iliacus release             Neuro Re-Ed             TA isometrics 2x10 5s 2x10 5s 3x10 5s 3x10 5s                                                Ther Ex                          Hip add 2x10 5s 2x10 5s 3x10 5s  3x12 5s         Hip abd 2x10 5s 2x10 blue 3x10 5s blue 3x12 5s blue         Bridges  2x10 3x10 3x12         Clams  2x10 5s 3x10 5s 3x12 5s         SL hip abd    2x10 2s                                   Ther Activity                                       Gait Training                                       Modalities               10' 10' 10'

## 2024-06-06 ENCOUNTER — OFFICE VISIT (OUTPATIENT)
Dept: PHYSICAL THERAPY | Facility: MEDICAL CENTER | Age: 69
End: 2024-06-06
Payer: MEDICARE

## 2024-06-06 DIAGNOSIS — M25.551 RIGHT HIP PAIN: Primary | ICD-10-CM

## 2024-06-06 PROCEDURE — 97140 MANUAL THERAPY 1/> REGIONS: CPT | Performed by: PHYSICAL THERAPIST

## 2024-06-06 PROCEDURE — 97112 NEUROMUSCULAR REEDUCATION: CPT | Performed by: PHYSICAL THERAPIST

## 2024-06-06 PROCEDURE — 97110 THERAPEUTIC EXERCISES: CPT | Performed by: PHYSICAL THERAPIST

## 2024-06-06 NOTE — PROGRESS NOTES
Daily Note     Today's date: 2024  Patient name: Kaya Connor  : 1955  MRN: [id number]  Referring provider: Camila Sepulveda DO  Dx:   Encounter Diagnosis     ICD-10-CM    1. Right hip pain  M25.551                      Subjective: Patient states that she is doing well.      Objective: See treatment diary below.      Assessment:  Patient demonstrates good tolerance to progressions.  She is doing well c/ tx plan.  Discussed modifying workout programs to make sure it is pain free.  Patient will continue to benefit from skilled PT.      Plan: Continue per plan of care.      Precautions:  None      Manuals         R hip long axis distraction AZ Grade V AZ Grade IV AZ Grade IV AZ Grade V AZ Grade IV        R hip mobs c/ belt (lateral distraction, ER, IR) AZ AZ AZ AZ AZ        STM/MFR  AZ AZ AZ AZ        Iliopsoas/iliacus release             Neuro Re-Ed             TA isometrics 2x10 5s 2x10 5s 3x10 5s 3x10 5s 3x10 5a                                               Ther Ex                          Hip add 2x10 5s 2x10 5s 3x10 5s  3x12 5s 3x12 5s        Hip abd 2x10 5s 2x10 blue 3x10 5s blue 3x12 5s blue 3x12 5s blue        Bridges  2x10 3x10 3x12 3x12        Clams  2x10 5s 3x10 5s 3x12 5s 3x12 5s        SL hip abd    2x10 2s 3x10 2s                                  Ther Activity                                       Gait Training                                       Modalities               10' 10' 10' 10'

## 2024-06-11 ENCOUNTER — OFFICE VISIT (OUTPATIENT)
Dept: PHYSICAL THERAPY | Facility: MEDICAL CENTER | Age: 69
End: 2024-06-11
Payer: MEDICARE

## 2024-06-11 DIAGNOSIS — M25.551 RIGHT HIP PAIN: Primary | ICD-10-CM

## 2024-06-11 PROCEDURE — 97112 NEUROMUSCULAR REEDUCATION: CPT | Performed by: PHYSICAL THERAPIST

## 2024-06-11 PROCEDURE — 97110 THERAPEUTIC EXERCISES: CPT | Performed by: PHYSICAL THERAPIST

## 2024-06-11 PROCEDURE — 97140 MANUAL THERAPY 1/> REGIONS: CPT | Performed by: PHYSICAL THERAPIST

## 2024-06-11 NOTE — PROGRESS NOTES
Daily Note     Today's date: 2024  Patient name: Kaya Connor  : 1955  MRN: [id number]  Referring provider: Camila Sepulveda DO  Dx:   Encounter Diagnosis     ICD-10-CM    1. Right hip pain  M25.551                      Subjective: Patient states that she is doing well.      Objective: See treatment diary below.      Assessment:  Patient demonstrates good tolerance to progressions.  Tolerated treatment well. Patient would benefit from continued PT.      Plan: Continue per plan of care.      Precautions:  None      Manuals        R hip long axis distraction AZ Grade V AZ Grade IV AZ Grade IV AZ Grade V AZ Grade IV AZ Grade V       R hip mobs c/ belt (lateral distraction, ER, IR) AZ AZ AZ AZ AZ AZ       STM/MFR  AZ AZ AZ AZ AZ       Iliopsoas/iliacus release             Neuro Re-Ed             TA isometrics 2x10 5s 2x10 5s 3x10 5s 3x10 5s 3x10 5s 3x10 5s                                              Ther Ex                          Hip add 2x10 5s 2x10 5s 3x10 5s  3x12 5s 3x12 5s 3x15 5s       Hip abd 2x10 5s 2x10 blue 3x10 5s blue 3x12 5s blue 3x12 5s blue 3x15 5s blue       Bridges  2x10 3x10 3x12 3x12 3x15       Clams  2x10 5s 3x10 5s 3x12 5s 3x12 5s 3x15 5s       SL hip abd    2x10 2s 3x10 2s 3x10 5s                                 Ther Activity                                       Gait Training                                       Modalities               10' 10' 10' 10' 10'

## 2024-06-12 ENCOUNTER — RA CDI HCC (OUTPATIENT)
Dept: OTHER | Facility: HOSPITAL | Age: 69
End: 2024-06-12

## 2024-06-13 ENCOUNTER — TELEPHONE (OUTPATIENT)
Dept: ADMINISTRATIVE | Facility: OTHER | Age: 69
End: 2024-06-13

## 2024-06-13 ENCOUNTER — OFFICE VISIT (OUTPATIENT)
Dept: PHYSICAL THERAPY | Facility: MEDICAL CENTER | Age: 69
End: 2024-06-13
Payer: MEDICARE

## 2024-06-13 DIAGNOSIS — M25.551 RIGHT HIP PAIN: Primary | ICD-10-CM

## 2024-06-13 PROCEDURE — 97140 MANUAL THERAPY 1/> REGIONS: CPT | Performed by: PHYSICAL THERAPIST

## 2024-06-13 PROCEDURE — 97110 THERAPEUTIC EXERCISES: CPT | Performed by: PHYSICAL THERAPIST

## 2024-06-13 PROCEDURE — 97112 NEUROMUSCULAR REEDUCATION: CPT | Performed by: PHYSICAL THERAPIST

## 2024-06-13 NOTE — PROGRESS NOTES
Daily Note     Today's date: 2024  Patient name: Kaya Connor  : 1955  MRN: [id number]  Referring provider: Camila Sepulveda DO  Dx:   Encounter Diagnosis     ICD-10-CM    1. Right hip pain  M25.551                      Subjective: Patient states that she feels okay.      Objective: See treatment diary below.      Assessment:  Patient demonstrates increased hip mobility and good tolerance to progressions.  Tolerated treatment well. Patient would benefit from continued PT.      Plan: Continue per plan of care.      Precautions:  None      Manuals       R hip long axis distraction AZ Grade V AZ Grade IV AZ Grade IV AZ Grade V AZ Grade IV AZ Grade V AZ Grade V      R hip mobs c/ belt (lateral distraction, ER, IR) AZ AZ AZ AZ AZ AZ AZ      STM/MFR  AZ AZ AZ AZ AZ AZ      Iliopsoas/iliacus release             Neuro Re-Ed             TA isometrics 2x10 5s 2x10 5s 3x10 5s 3x10 5s 3x10 5s 3x10 5s 3x10 5s                                             Ther Ex                          Hip add 2x10 5s 2x10 5s 3x10 5s  3x12 5s 3x12 5s 3x15 5s 3x15 5s      Hip abd 2x10 5s 2x10 blue 3x10 5s blue 3x12 5s blue 3x12 5s blue 3x15 5s blue 3x15 5s blue      Bridges  2x10 3x10 3x12 3x12 3x15 3x10 green      Clams  2x10 5s 3x10 5s 3x12 5s 3x12 5s 3x15 5s 3x10 green      SL hip abd    2x10 2s 3x10 2s 3x10 5s 3x10 5s                                Ther Activity                                       Gait Training                                       Modalities               10' 10' 10' 10' 10' 10'

## 2024-06-13 NOTE — TELEPHONE ENCOUNTER
06/13/24 3:37 PM    Patient contacted to bring Advance Directive, POLST, or Living Will document to next scheduled pcp visit.[facility] Department left message.    Thank you.  Aicha Hancock  [facility]

## 2024-06-19 ENCOUNTER — OFFICE VISIT (OUTPATIENT)
Dept: PHYSICAL THERAPY | Facility: MEDICAL CENTER | Age: 69
End: 2024-06-19
Payer: MEDICARE

## 2024-06-19 DIAGNOSIS — M25.551 RIGHT HIP PAIN: Primary | ICD-10-CM

## 2024-06-19 PROCEDURE — 97112 NEUROMUSCULAR REEDUCATION: CPT

## 2024-06-19 PROCEDURE — 97140 MANUAL THERAPY 1/> REGIONS: CPT

## 2024-06-19 PROCEDURE — 97110 THERAPEUTIC EXERCISES: CPT

## 2024-06-19 NOTE — PROGRESS NOTES
"Daily Note     Today's date: 2024  Patient name: aKya Connor  : 1955  MRN: [id number]  Referring provider: Camila Sepulveda DO  Dx:   Encounter Diagnosis     ICD-10-CM    1. Right hip pain  M25.551                      Subjective: Pt reports she is a little more sore today, may have overdone it in the pool today.      Objective: See treatment diary below      Assessment: Tolerated treatment well. Pt performed all exercises without issue, continue to progress to tolerance. Pt would benefit from continued focus on stretching and strengthening exercises to decrease pain and increase ROM and function. Patient demonstrated fatigue post treatment, exhibited good technique with therapeutic exercises, and would benefit from continued PT      Plan: Continue per plan of care.      Precautions:  None      Manuals      R hip long axis distraction AZ Grade V AZ Grade IV AZ Grade IV AZ Grade V AZ Grade IV AZ Grade V AZ Grade V KM     R hip mobs c/ belt (lateral distraction, ER, IR) AZ AZ AZ AZ AZ AZ AZ      STM/MFR  AZ AZ AZ AZ AZ AZ KM     Iliopsoas/iliacus release             Neuro Re-Ed             TA isometrics 2x10 5s 2x10 5s 3x10 5s 3x10 5s 3x10 5s 3x10 5s 3x10 5s 3x10 5\"                                            Ther Ex                          Hip add 2x10 5s 2x10 5s 3x10 5s  3x12 5s 3x12 5s 3x15 5s 3x15 5s 3x15 5\"     Hip abd 2x10 5s 2x10 blue 3x10 5s blue 3x12 5s blue 3x12 5s blue 3x15 5s blue 3x15 5s blue 3x15 5\" Blue     Bridges  2x10 3x10 3x12 3x12 3x15 3x10 green 3x10 Green     Clams  2x10 5s 3x10 5s 3x12 5s 3x12 5s 3x15 5s 3x10 green 3x10 Green     SL hip abd    2x10 2s 3x10 2s 3x10 5s 3x10 5s 3x10 5\"                               Ther Activity                                       Gait Training                                       Modalities               10' 10' 10' 10' 10' 10' 10'                                   "

## 2024-06-25 ENCOUNTER — OFFICE VISIT (OUTPATIENT)
Dept: PHYSICAL THERAPY | Facility: MEDICAL CENTER | Age: 69
End: 2024-06-25
Payer: MEDICARE

## 2024-06-25 DIAGNOSIS — M25.551 RIGHT HIP PAIN: Primary | ICD-10-CM

## 2024-06-25 PROCEDURE — 97140 MANUAL THERAPY 1/> REGIONS: CPT | Performed by: PHYSICAL THERAPIST

## 2024-06-25 PROCEDURE — 97110 THERAPEUTIC EXERCISES: CPT | Performed by: PHYSICAL THERAPIST

## 2024-06-25 PROCEDURE — 97112 NEUROMUSCULAR REEDUCATION: CPT | Performed by: PHYSICAL THERAPIST

## 2024-06-25 NOTE — PROGRESS NOTES
"Daily Note     Today's date: 2024  Patient name: Kaya Connor  : 1955  MRN: [id number]  Referring provider: Camila Sepulveda DO  Dx:   Encounter Diagnosis     ICD-10-CM    1. Right hip pain  M25.551                      Subjective: Patient states that she is doing well.      Objective: See treatment diary below.      Assessment:  Patient presents without pelvic malalignment.   She demonstrates good tolerance to progressions.  Tolerated treatment well. Patient would benefit from continued PT.      Plan: Continue per plan of care.      Precautions:  None      Manuals     R hip long axis distraction AZ Grade V AZ Grade IV AZ Grade IV AZ Grade V AZ Grade IV AZ Grade V AZ Grade V KM np    R hip mobs c/ belt (lateral distraction, ER, IR) AZ AZ AZ AZ AZ AZ AZ  AZ    STM/MFR  AZ AZ AZ AZ AZ AZ KM AZ    Iliopsoas/iliacus release             Neuro Re-Ed             TA isometrics 2x10 5s 2x10 5s 3x10 5s 3x10 5s 3x10 5s 3x10 5s 3x10 5s 3x10 5\" 20x5s                                           Ther Ex                          Hip add 2x10 5s 2x10 5s 3x10 5s  3x12 5s 3x12 5s 3x15 5s 3x15 5s 3x15 5\" 20x c/ TA    Hip abd 2x10 5s 2x10 blue 3x10 5s blue 3x12 5s blue 3x12 5s blue 3x15 5s blue 3x15 5s blue 3x15 5\" Blue 3x15 5s blue    Bridges  2x10 3x10 3x12 3x12 3x15 3x10 green 3x10 Green 20x reg 20x ball    Clams  2x10 5s 3x10 5s 3x12 5s 3x12 5s 3x15 5s 3x10 green 3x10 Green 3x10 blue    SL hip abd    2x10 2s 3x10 2s 3x10 5s 3x10 5s 3x10 5\" 3x10 5s                              Ther Activity                                       Gait Training                                       Modalities               10' 10' 10' 10' 10' 10' 10 10'                                    "

## 2024-06-27 ENCOUNTER — OFFICE VISIT (OUTPATIENT)
Dept: PHYSICAL THERAPY | Facility: MEDICAL CENTER | Age: 69
End: 2024-06-27
Payer: MEDICARE

## 2024-06-27 DIAGNOSIS — M25.551 RIGHT HIP PAIN: Primary | ICD-10-CM

## 2024-06-27 PROCEDURE — 97140 MANUAL THERAPY 1/> REGIONS: CPT | Performed by: PHYSICAL THERAPIST

## 2024-06-27 PROCEDURE — 97112 NEUROMUSCULAR REEDUCATION: CPT | Performed by: PHYSICAL THERAPIST

## 2024-06-27 PROCEDURE — 97110 THERAPEUTIC EXERCISES: CPT | Performed by: PHYSICAL THERAPIST

## 2024-06-27 NOTE — PROGRESS NOTES
"Daily Note     Today's date: 2024  Patient name: Kaya Connor  : 1955  MRN: [id number]  Referring provider: Camila Sepulveda DO  Dx:   Encounter Diagnosis     ICD-10-CM    1. Right hip pain  M25.551                      Subjective:  Patient states that after lv she was doing a lot around the house and her back became really sore.        Objective: See treatment diary below.      Assessment:  Patient demonstrates good tolerance to tx c/ decreased soreness post session.  Tolerated treatment well. Patient would benefit from continued PT      Plan: Continue per plan of care.      Precautions:  None      Manuals    R hip long axis distraction AZ Grade V AZ Grade IV AZ Grade IV AZ Grade V AZ Grade IV AZ Grade V AZ Grade V KM np    R hip mobs c/ belt (lateral distraction, ER, IR) AZ AZ AZ AZ AZ AZ AZ  AZ AZ   STM/MFR  AZ AZ AZ AZ AZ AZ KM AZ AZ   Iliopsoas/iliacus release             Piriformis release          AZ   Neuro Re-Ed             TA isometrics 2x10 5s 2x10 5s 3x10 5s 3x10 5s 3x10 5s 3x10 5s 3x10 5s 3x10 5\" 20x5s 20x5s                                          Ther Ex                          Hip add 2x10 5s 2x10 5s 3x10 5s  3x12 5s 3x12 5s 3x15 5s 3x15 5s 3x15 5\" 20x c/ TA 20x c/ TA    Hip abd 2x10 5s 2x10 blue 3x10 5s blue 3x12 5s blue 3x12 5s blue 3x15 5s blue 3x15 5s blue 3x15 5\" Blue 3x15 5s blue 3x15 5s blue   Bridges  2x10 3x10 3x12 3x12 3x15 3x10 green 3x10 Green 20x reg 20x ball 20x ball   Clams  2x10 5s 3x10 5s 3x12 5s 3x12 5s 3x15 5s 3x10 green 3x10 Green 3x10 blue 3x10 blue   SL hip abd    2x10 2s 3x10 2s 3x10 5s 3x10 5s 3x10 5\" 3x10 5s 3x10 5s                             Ther Activity                                       Gait Training                                       Modalities               10' 10' 10' 10' 10' 10' 10' 10' 10'                                     "

## 2024-07-03 ENCOUNTER — OFFICE VISIT (OUTPATIENT)
Dept: PHYSICAL THERAPY | Facility: MEDICAL CENTER | Age: 69
End: 2024-07-03
Payer: MEDICARE

## 2024-07-03 DIAGNOSIS — M25.551 RIGHT HIP PAIN: Primary | ICD-10-CM

## 2024-07-03 PROCEDURE — 97110 THERAPEUTIC EXERCISES: CPT | Performed by: PHYSICAL THERAPIST

## 2024-07-03 PROCEDURE — 97140 MANUAL THERAPY 1/> REGIONS: CPT | Performed by: PHYSICAL THERAPIST

## 2024-07-03 PROCEDURE — 97112 NEUROMUSCULAR REEDUCATION: CPT | Performed by: PHYSICAL THERAPIST

## 2024-07-03 NOTE — PROGRESS NOTES
Daily Note     Today's date: 7/3/2024  Patient name: Kaya Connor  : 1955  MRN: [id number]  Referring provider: Camila Sepulveda DO  Dx:   Encounter Diagnosis     ICD-10-CM    1. Right hip pain  M25.551                      Subjective: Patient states that she had a rough weekend d/t caring for grandchildren.      Objective: See treatment diary below.      Assessment:  Patient c/ soreness t/o left hip and b/l lower back.  She denies LE paresthesias at the moment.  Patient was caring for grandchildren over the weekend and has soreness as a result.  She demonstrates antalgic gait pattern.  Patient went to the chiropractor this morning.  Tolerated treatment well. Patient would benefit from continued PT.      Plan: Continue per plan of care.      Precautions:  None      Manuals 7/3            R hip long axis distraction AZ Grade IV            R hip mobs c/ belt (lateral distraction, ER, IR) AZ            STM/MFR AZ            Iliopsoas/iliacus release             Piriformis release AZ            UPAs L4-S1 AZ            Neuro Re-Ed             TA isometrics 30x5s                                                   Ther Ex                          Hip add 30x5s c/ TA            Hip abd             Bridges             Clams 3x10 5s            SL hip abd                                       Ther Activity                                       Gait Training                                       Modalities              10'                                                
none

## 2024-07-09 ENCOUNTER — OFFICE VISIT (OUTPATIENT)
Dept: FAMILY MEDICINE CLINIC | Facility: CLINIC | Age: 69
End: 2024-07-09
Payer: MEDICARE

## 2024-07-09 VITALS
WEIGHT: 183.8 LBS | OXYGEN SATURATION: 97 % | DIASTOLIC BLOOD PRESSURE: 80 MMHG | SYSTOLIC BLOOD PRESSURE: 128 MMHG | TEMPERATURE: 97.5 F | HEIGHT: 62 IN | HEART RATE: 96 BPM | BODY MASS INDEX: 33.82 KG/M2

## 2024-07-09 DIAGNOSIS — G25.81 RESTLESS LEG: ICD-10-CM

## 2024-07-09 DIAGNOSIS — R53.82 CHRONIC FATIGUE: ICD-10-CM

## 2024-07-09 DIAGNOSIS — E78.2 MIXED HYPERLIPIDEMIA: Primary | ICD-10-CM

## 2024-07-09 DIAGNOSIS — E66.09 CLASS 1 OBESITY DUE TO EXCESS CALORIES WITH SERIOUS COMORBIDITY AND BODY MASS INDEX (BMI) OF 33.0 TO 33.9 IN ADULT: ICD-10-CM

## 2024-07-09 DIAGNOSIS — C44.311 BASAL CELL CARCINOMA OF NOSE: ICD-10-CM

## 2024-07-09 PROCEDURE — G2211 COMPLEX E/M VISIT ADD ON: HCPCS | Performed by: FAMILY MEDICINE

## 2024-07-09 PROCEDURE — 99214 OFFICE O/P EST MOD 30 MIN: CPT | Performed by: FAMILY MEDICINE

## 2024-07-09 RX ORDER — GABAPENTIN 100 MG/1
CAPSULE ORAL
Qty: 180 CAPSULE | Refills: 1 | Status: SHIPPED | OUTPATIENT
Start: 2024-07-09

## 2024-07-09 NOTE — ASSESSMENT & PLAN NOTE
Patient had sleep study in the past over 7 yrs ago It did not show sleep apnea or restless leg However due to the symptoms I did start gabapentin with some help  She should see sleep medicine She wants to see endocrinology to see about other testing for thyroid

## 2024-07-09 NOTE — ASSESSMENT & PLAN NOTE
Reviewed last labs from May and also reviewed the ASCVD score and the recommendation about statins We did also discuss cardiac calcium scoring Patient will see cardiology and discuss with them

## 2024-07-09 NOTE — ASSESSMENT & PLAN NOTE
Patient has stopped seeing bariatrics She did not feel the medication worked She is asking me about wegovy I have suggested that she contact bariatrics regarding this However did tell her it is out of pocket cost

## 2024-07-09 NOTE — PROGRESS NOTES
Ambulatory Visit  Name: Kaya Connor      : 1955      MRN: [id number]  Encounter Provider: Yumiko Mack DO  Encounter Date: 2024   Encounter department: [facility]    Assessment & Plan   1. Mixed hyperlipidemia  Assessment & Plan:  Reviewed last labs from May and also reviewed the ASCVD score and the recommendation about statins We did also discuss cardiac calcium scoring Patient will see cardiology and discuss with them  Orders:  -     Ambulatory Referral to Endocrinology; Future  2. Class 1 obesity due to excess calories with serious comorbidity and body mass index (BMI) of 33.0 to 33.9 in adult  Assessment & Plan:  Patient has stopped seeing bariatrics She did not feel the medication worked She is asking me about wegovy I have suggested that she contact bariatrics regarding this However did tell her it is out of pocket cost  Orders:  -     Ambulatory Referral to Endocrinology; Future  3. Basal cell carcinoma of nose  Assessment & Plan:  Continue to see dermatology  4. Restless leg  Assessment & Plan:  Will icnrease gabapentin to 200mg at bedtime Patient next step is referral to sleep medicine  Orders:  -     gabapentin (NEURONTIN) 100 mg capsule; 2 tablets daily at bedtime  5. Chronic fatigue  Assessment & Plan:  Patient had sleep study in the past over 7 yrs ago It did not show sleep apnea or restless leg However due to the symptoms I did start gabapentin with some help  She should see sleep medicine She wants to see endocrinology to see about other testing for thyroid       Depression Screening and Follow-up Plan: Patient was screened for depression during today's encounter. They screened negative with a PHQ-2 score of 0.      Chief Complaint   Patient presents with    Follow-up     6m chk up, issues with weight,        History of Present Illness     Patient is here for followup of hyperlipidemia restless leg syndrome obesity and also basal cell cancer Patient is  having a lot os issues with her hip She is going to PT She also has back issues Patient seeing ortho patient stopped seeing bariatrics as the medication did not work and medicare does not cover wegovy Patient feels the testing she has had done for her thyroid has to be wrong She works out a lot and cannot lose any weight Patient wants to see endocrinology Patient also has continued elevated cholesterol there is a family history of obesity and hyperlipidemia Patient has appt with cardiology to investigate other cholesterol lowering meds she could not tolerate simvastatin or in the past pravastatin due to pain in all the muscles Patient feels the restless leg medication is not working as well She is tired all the time and has difficulty where she is taking naps at times also         Review of Systems   Constitutional:  Negative for fatigue, fever and unexpected weight change.   HENT:  Negative for congestion, sinus pain and trouble swallowing.    Eyes:  Negative for discharge and visual disturbance.   Respiratory:  Negative for cough, chest tightness, shortness of breath and wheezing.    Cardiovascular:  Negative for chest pain, palpitations and leg swelling.   Gastrointestinal:  Negative for abdominal pain, blood in stool, constipation, diarrhea, nausea and vomiting.   Genitourinary:  Negative for difficulty urinating, dysuria, frequency and hematuria.   Musculoskeletal:  Positive for arthralgias and back pain. Negative for gait problem and joint swelling.   Skin:  Negative for rash and wound.   Allergic/Immunologic: Negative for environmental allergies and food allergies.   Neurological:  Negative for dizziness, syncope, weakness, numbness and headaches.   Hematological:  Negative for adenopathy. Does not bruise/bleed easily.   Psychiatric/Behavioral:  Positive for sleep disturbance. Negative for confusion and decreased concentration. The patient is not nervous/anxious.        Objective     /80   Pulse 96    "Temp 97.5 °F (36.4 °C) (Temporal)   Ht 5' 2\" (1.575 m)   Wt 83.4 kg (183 lb 12.8 oz)   SpO2 97%   BMI 33.62 kg/m²     Physical Exam  Vitals and nursing note reviewed.   Constitutional:       Appearance: She is well-developed. She is obese.   HENT:      Head: Normocephalic and atraumatic.      Right Ear: Hearing, tympanic membrane and external ear normal.      Left Ear: Hearing, tympanic membrane and external ear normal.   Eyes:      Extraocular Movements: Extraocular movements intact.      Conjunctiva/sclera: Conjunctivae normal.      Pupils: Pupils are equal, round, and reactive to light.   Neck:      Thyroid: No thyromegaly.   Cardiovascular:      Rate and Rhythm: Normal rate and regular rhythm.      Heart sounds: Normal heart sounds.   Pulmonary:      Effort: Pulmonary effort is normal.      Breath sounds: Normal breath sounds. No wheezing or rales.   Abdominal:      General: Bowel sounds are normal. There is no distension.      Palpations: Abdomen is soft.      Tenderness: There is no abdominal tenderness.   Musculoskeletal:         General: No tenderness.      Cervical back: Neck supple.   Lymphadenopathy:      Cervical: No cervical adenopathy.   Skin:     General: Skin is warm and dry.      Findings: No rash.   Neurological:      General: No focal deficit present.      Mental Status: She is alert and oriented to person, place, and time.      Cranial Nerves: No cranial nerve deficit.      Coordination: Coordination normal.   Psychiatric:         Mood and Affect: Mood normal.         Behavior: Behavior normal.         Thought Content: Thought content normal.         Judgment: Judgment normal.       Administrative Statements   I have spent a total time of 30 minutes in caring for this patient on the day of the visit/encounter including Diagnostic results, Risks and benefits of tx options, Importance of tx compliance, Impressions, Counseling / Coordination of care, Documenting in the medical record, Reviewing " / ordering tests, medicine, procedures  , Obtaining or reviewing history  , and discussing prior treatments patient has tried Reviewed consults from bariatrics orthopedics and physical therapy  .

## 2024-07-11 ENCOUNTER — OFFICE VISIT (OUTPATIENT)
Dept: PHYSICAL THERAPY | Facility: MEDICAL CENTER | Age: 69
End: 2024-07-11
Payer: MEDICARE

## 2024-07-11 DIAGNOSIS — M25.551 RIGHT HIP PAIN: Primary | ICD-10-CM

## 2024-07-11 PROCEDURE — 97110 THERAPEUTIC EXERCISES: CPT | Performed by: PHYSICAL THERAPIST

## 2024-07-11 PROCEDURE — 97112 NEUROMUSCULAR REEDUCATION: CPT | Performed by: PHYSICAL THERAPIST

## 2024-07-11 PROCEDURE — 97164 PT RE-EVAL EST PLAN CARE: CPT | Performed by: PHYSICAL THERAPIST

## 2024-07-11 PROCEDURE — 97140 MANUAL THERAPY 1/> REGIONS: CPT | Performed by: PHYSICAL THERAPIST

## 2024-07-11 NOTE — LETTER
2024    Camila Sepulveda DO  [address] [facility] [address]    Patient: Kaya Connor   YOB: 1955   Date of Visit: 2024     Encounter Diagnosis     ICD-10-CM    1. Right hip pain  M25.551           Dear Dr. Sepulveda:    Thank you for your recent referral of Kaya Connor. Please review the attached evaluation summary from Kaya's recent visit.     Please verify that you agree with the plan of care by signing the attached order.     If you have any questions or concerns, please do not hesitate to call.     I sincerely appreciate the opportunity to share in the care of one of your patients and hope to have another opportunity to work with you in the near future.       Sincerely,    Liz Noland, PT      Referring Provider:      I certify that I have read the below Plan of Care and certify the need for these services furnished under this plan of treatment while under my care.                    Camila Sepulveda DO  [address] [facility] [address]  Via Fax: [phone]          Re-Eval     Today's date: 2024  Patient name: Kaya Connor  : 1955  MRN: [id number]  Referring provider: Camila Sepulveda DO  Dx:   Encounter Diagnosis     ICD-10-CM    1. Right hip pain  M25.551                      Subjective:  Patient states that she is feeling a lot better and has been back to her workouts and in the pool.  She had a minor setback from caring for her grandchildren.  She would like to continue c/ physical therapy prior to going on vacation.        Objective: See treatment diary below.      Assessment  Assessment details: Kaya Connor is a 70 y/o female who has been compliant c/ attending physical therapy with complaints of chronic R hip pain.  The patient's greatest concern is her ability to remain active.  Primary movement impairment diagnosis of R hip hypomobility, resulting in pathoanatomical symptoms of R hip pain, which limits her ability to perform functional activities  "without pain.  Pt. will benefit from skilled PT services that includes manual therapy techniques to enhance tissue extensibility, neuromuscular re-education to facilitate motor control, therapeutic exercise to increase functional mobility, and modalities prn to reduce pain and inflammation.   Impairments: abnormal gait, abnormal muscle firing, abnormal or restricted ROM, activity intolerance, impaired physical strength, lacks appropriate home exercise program, pain with function and weight-bearing intolerance  Understanding of Dx/Px/POC: good   Prognosis: good     Goals  Impairment Goals  - Pt I with initial HEP in 1-2 visits- achieved  - Improve ROM to WNL in 4-6 weeks- achieved  - Increase strength to 5/5 in all affected areas in 4-6 weeks- in progress     Functional Goals  - Increase Functional Status Measure to: goal status in 6-8 weeks- in progress  - Patient will be independent with comprehensive HEP in 6-8 weeks- in progress  - Patient will report decreased pain at time of discharge- in progress        Plan  Plan details: 6-8 weeks  Patient would benefit from: PT eval  Planned modality interventions: thermotherapy: hydrocollator packs  Planned therapy interventions: joint mobilization, manual therapy, neuromuscular re-education, patient education, strengthening, stretching, therapeutic activities, therapeutic exercise, home exercise program, graded exercise, graded activity, flexibility, abdominal trunk stabilization and balance/weight bearing training  Frequency: 1-2x/week.  Treatment plan discussed with: patient     Subjective Evaluation     History of Present Illness  Mechanism of injury: Patient reports a hx of back and hip pain.  She has been going to [facility] [employer] [facility] [employer] 2-3x/week for personal training.  She has been seeing a chiropractor.  Decompression on her spine has been helping.  Her right hip started bothering her about 6 weeks ago after \"overdoing it\" at the gym.  She notes that " sitting is bothersome. She recently traveled to [place] and sitting in the car increased symptoms.  Patient went to see Dr. Sepulveda who referred her to an orthopedic surgeon at [facility].  X-rays revealed R hip OA.  She is taking Naproxen, Gabapentin, and Tylenol arthritis.  She has an appointment scheduled with an orthopedic surgeon at the end of August to discuss future KENDRICK.  She would like to be able to manage the right hip pain without sx for as long as possible.  Patient's goals are to have decreased pain, increase strength, and continue to be active.  Patient Goals  Patient goals for therapy: decreased pain and increased strength     Pain  Current pain ratin     Diagnostic Tests  Abnormal x-ray: R hip OA.  Treatments  Current treatment: physical therapy        Objective      Lumbar Screen  Lumbar range of motion within normal limits with the following exceptions:Mildly limited lumbar ROM throughout.     Neurological Testing      Sensation      Hip   Left Hip   Intact: light touch     Right Hip   Intact: light touch     Reflexes   Left   Patellar (L4): normal (2+)  Achilles (S1): normal (2+)     Right   Patellar (L4): normal (2+)  Achilles (S1): normal (2+)     Passive Range of Motion   Left Hip   Normal passive range of motion     Right Hip   Flexion: 110 degrees   Abduction: 30 degrees      Additional Passive Range of Motion Details  Limited R hip ER and IR c/ PROM     Joint Play   Left Hip   Joints within functional limits are the posterior hip capsule, anterior hip capsule, lateral hip capsule and long axis distraction.      Right Hip      Hypomobile in the posterior hip capsule, anterior hip capsule, lateral hip capsule and long axis distraction     Strength/Myotome Testing      Left Hip   Planes of Motion   Flexion: 5  Extension: 4  Abduction: 4  Adduction: 4     Isolated Muscles   Gluteus rao: 4  Gluteus medius: 4  Iliopsoas: 4     Right Hip   Planes of Motion   Flexion: 3+  Extension: 3+  Abduction:  4-  Adduction: 4-     Isolated Muscles   Gluteus maximums: 3+  Gluteus medius: 4-  Iliopsoas: 4-     Tests      (-) Functional leg length discrepancy        Precautions:  None      Manuals 7/3 7/11           R hip long axis distraction AZ Grade IV AZ Grade IV           R hip mobs c/ belt (lateral distraction, ER, IR) AZ AZ           STM/MFR AZ AZ           Iliopsoas/iliacus release  AZ           Piriformis release AZ AZ           UPAs L4-S1 AZ AZ           Neuro Re-Ed             TA isometrics 30x5s 30x5s                                                  Ther Ex                          Hip add 30x5s c/ TA 3x10 5s c/ TA           Hip abd  3x15 5s blue           Bridges  2x10 c/ ball           Clams 3x10 5s 3x10 blue           SL hip abd  3x10 5s                                     Ther Activity                                       Gait Training                                       Modalities              10' 10'

## 2024-07-11 NOTE — PROGRESS NOTES
Re-Eval     Today's date: 2024  Patient name: Kaya Connor  : 1955  MRN: [id number]  Referring provider: Camila Sepulveda DO  Dx:   Encounter Diagnosis     ICD-10-CM    1. Right hip pain  M25.551                      Subjective:  Patient states that she is feeling a lot better and has been back to her workouts and in the pool.  She had a minor setback from caring for her grandchildren.  She would like to continue c/ physical therapy prior to going on vacation.        Objective: See treatment diary below.      Assessment  Assessment details: Kaya Connor is a 68 y/o female who has been compliant c/ attending physical therapy with complaints of chronic R hip pain.  The patient's greatest concern is her ability to remain active.  Primary movement impairment diagnosis of R hip hypomobility, resulting in pathoanatomical symptoms of R hip pain, which limits her ability to perform functional activities without pain.  Pt. will benefit from skilled PT services that includes manual therapy techniques to enhance tissue extensibility, neuromuscular re-education to facilitate motor control, therapeutic exercise to increase functional mobility, and modalities prn to reduce pain and inflammation.   Impairments: abnormal gait, abnormal muscle firing, abnormal or restricted ROM, activity intolerance, impaired physical strength, lacks appropriate home exercise program, pain with function and weight-bearing intolerance  Understanding of Dx/Px/POC: good   Prognosis: good     Goals  Impairment Goals  - Pt I with initial HEP in 1-2 visits- achieved  - Improve ROM to WNL in 4-6 weeks- achieved  - Increase strength to 5/5 in all affected areas in 4-6 weeks- in progress     Functional Goals  - Increase Functional Status Measure to: goal status in 6-8 weeks- in progress  - Patient will be independent with comprehensive HEP in 6-8 weeks- in progress  - Patient will report decreased pain at time of discharge- in progress       "  Plan  Plan details: 6-8 weeks  Patient would benefit from: PT rula  Planned modality interventions: thermotherapy: hydrocollator packs  Planned therapy interventions: joint mobilization, manual therapy, neuromuscular re-education, patient education, strengthening, stretching, therapeutic activities, therapeutic exercise, home exercise program, graded exercise, graded activity, flexibility, abdominal trunk stabilization and balance/weight bearing training  Frequency: 1-2x/week.  Treatment plan discussed with: patient     Subjective Evaluation     History of Present Illness  Mechanism of injury: Patient reports a hx of back and hip pain.  She has been [facility]-3x[facility] for personal training.  She has been seeing a chiropractor.  Decompression on her spine has been helping.  Her right hip started bothering her about 6 weeks ago after \"overdoing it\" at the gym.  She notes that sitting is bothersome. She recently traveled to [place] and sitting in the car increased symptoms.  Patient went to see Dr. Sepulveda who referred her to an orthopedic surgeon at [facility].  X-rays revealed R hip OA.  She is taking Naproxen, Gabapentin, and Tylenol arthritis.  She has an appointment scheduled with an orthopedic surgeon at the end of August to discuss future KENDRICK.  She would like to be able to manage the right hip pain without sx for as long as possible.  Patient's goals are to have decreased pain, increase strength, and continue to be active.  Patient Goals  Patient goals for therapy: decreased pain and increased strength     Pain  Current pain ratin     Diagnostic Tests  Abnormal x-ray: R hip OA.  Treatments  Current treatment: physical therapy        Objective      Lumbar Screen  Lumbar range of motion within normal limits with the following exceptions:Mildly limited lumbar ROM throughout.     Neurological Testing      Sensation      Hip   Left Hip   Intact: light touch     Right Hip   Intact: light " touch     Reflexes   Left   Patellar (L4): normal (2+)  Achilles (S1): normal (2+)     Right   Patellar (L4): normal (2+)  Achilles (S1): normal (2+)     Passive Range of Motion   Left Hip   Normal passive range of motion     Right Hip   Flexion: 110 degrees   Abduction: 30 degrees      Additional Passive Range of Motion Details  Limited R hip ER and IR c/ PROM     Joint Play   Left Hip   Joints within functional limits are the posterior hip capsule, anterior hip capsule, lateral hip capsule and long axis distraction.      Right Hip      Hypomobile in the posterior hip capsule, anterior hip capsule, lateral hip capsule and long axis distraction     Strength/Myotome Testing      Left Hip   Planes of Motion   Flexion: 5  Extension: 4  Abduction: 4  Adduction: 4     Isolated Muscles   Gluteus rao: 4  Gluteus medius: 4  Iliopsoas: 4     Right Hip   Planes of Motion   Flexion: 3+  Extension: 3+  Abduction: 4-  Adduction: 4-     Isolated Muscles   Gluteus maximums: 3+  Gluteus medius: 4-  Iliopsoas: 4-     Tests      (-) Functional leg length discrepancy        Precautions:  None      Manuals 7/3 7/11           R hip long axis distraction AZ Grade IV AZ Grade IV           R hip mobs c/ belt (lateral distraction, ER, IR) AZ AZ           STM/MFR AZ AZ           Iliopsoas/iliacus release  AZ           Piriformis release AZ AZ           UPAs L4-S1 AZ AZ           Neuro Re-Ed             TA isometrics 30x5s 30x5s                                                  Ther Ex                          Hip add 30x5s c/ TA 3x10 5s c/ TA           Hip abd  3x15 5s blue           Bridges  2x10 c/ ball           Clams 3x10 5s 3x10 blue           SL hip abd  3x10 5s                                     Ther Activity                                       Gait Training                                       Modalities              10' 10'

## 2024-07-15 ENCOUNTER — OFFICE VISIT (OUTPATIENT)
Dept: PHYSICAL THERAPY | Facility: MEDICAL CENTER | Age: 69
End: 2024-07-15
Payer: MEDICARE

## 2024-07-15 DIAGNOSIS — M25.551 RIGHT HIP PAIN: Primary | ICD-10-CM

## 2024-07-15 PROCEDURE — 97110 THERAPEUTIC EXERCISES: CPT | Performed by: PHYSICAL THERAPIST

## 2024-07-15 PROCEDURE — 97112 NEUROMUSCULAR REEDUCATION: CPT | Performed by: PHYSICAL THERAPIST

## 2024-07-15 PROCEDURE — 97140 MANUAL THERAPY 1/> REGIONS: CPT | Performed by: PHYSICAL THERAPIST

## 2024-07-15 NOTE — PROGRESS NOTES
Daily Note     Today's date: 7/15/2024  Patient name: Kaya Connor  : 1955  MRN: [id number]  Referring provider: Camila Sepulveda DO  Dx:   Encounter Diagnosis     ICD-10-CM    1. Right hip pain  M25.551                      Subjective:  Patient states that she is doing well.      Objective: See treatment diary below.      Assessment:  Patient demonstrates good tolerance to tx c/ decreased pain.  Tolerated treatment well.  Patient would benefit from continued PT.      Plan: Continue per plan of care.      Precautions:  None      Manuals 7/3 7/11 7/15          R hip long axis distraction AZ Grade IV AZ Grade IV AZ Grade IV          R hip mobs c/ belt (lateral distraction, ER, IR) AZ AZ AZ          STM/MFR AZ AZ           Iliopsoas/iliacus release  AZ AZ          Piriformis release AZ AZ           UPAs L4-S1 AZ AZ AZ          Neuro Re-Ed             TA isometrics 30x5s 30x5s 30x5s                                                 Ther Ex                          Hip add 30x5s c/ TA 3x10 5s c/ TA 3x10 5s c/ TA          Hip abd  3x15 5s blue 3x15 5s blue          Bridges  2x10 c/ ball 2x10 c/ ball          Clams 3x10 5s 3x10 blue 3x10 blue          SL hip abd  3x10 5s 3x10 5s                                    Ther Activity                                       Gait Training                                       Modalities              10' 10' 10'

## 2024-07-16 ENCOUNTER — CONSULT (OUTPATIENT)
Dept: CARDIOLOGY CLINIC | Facility: CLINIC | Age: 69
End: 2024-07-16
Payer: MEDICARE

## 2024-07-16 VITALS
DIASTOLIC BLOOD PRESSURE: 76 MMHG | WEIGHT: 186.6 LBS | BODY MASS INDEX: 34.34 KG/M2 | SYSTOLIC BLOOD PRESSURE: 124 MMHG | HEART RATE: 71 BPM | HEIGHT: 62 IN

## 2024-07-16 DIAGNOSIS — Z88.8 ALLERGY TO STATIN MEDICATION: ICD-10-CM

## 2024-07-16 DIAGNOSIS — E78.2 MIXED HYPERLIPIDEMIA: ICD-10-CM

## 2024-07-16 DIAGNOSIS — E78.5 DYSLIPIDEMIA: Primary | ICD-10-CM

## 2024-07-16 PROCEDURE — 93000 ELECTROCARDIOGRAM COMPLETE: CPT | Performed by: INTERNAL MEDICINE

## 2024-07-16 PROCEDURE — 99204 OFFICE O/P NEW MOD 45 MIN: CPT | Performed by: INTERNAL MEDICINE

## 2024-07-16 RX ORDER — ROSUVASTATIN CALCIUM 5 MG/1
5 TABLET, COATED ORAL DAILY
Qty: 30 TABLET | Refills: 0 | Status: SHIPPED | OUTPATIENT
Start: 2024-07-16

## 2024-07-16 NOTE — PROGRESS NOTES
Cardiology             Kaya Connor  1955  [id number]              Assessment/Plan:    Dyslipidemia  Elevated ASCVD risk, moderate      Patient with 10-year risk of ASCVD at 8.2%.  She has significant dyslipidemia with LDL cholesterol 146 mg/dL.  I would like to stratify her further with a calcium score.  She could not tolerate simvastatin due to myalgias in the past, about 5 years ago.  She is agreeable to trying rosuvastatin 5 mg twice weekly.  She will increase this incrementally to once daily if she remains asymptomatic and has no adverse drug reactions.  Atorvastatin may be attempted as the next step if she cannot tolerate rosuvastatin.  If not, ezetimibe then Leqvio would be a good option for her for primary prevention purposes.  Continue heart healthy diet, exercise, attempts to lose weight      Follow-up in 1 month        Interval History:     This is a 69-year-old female with dyslipidemia who has tried simvastatin in the past, around 2019 with resultant myalgias.  She admits to history of fibromyalgia and has been on gabapentin for neuropathic pain.    She has been trying to lose weight, although unsuccessfully.  She continues to have dyslipidemia and presents today for evaluation of the same.  She states her diet is very well-controlled, with low amounts of processed foods and control of carbohydrate and saturated fat intake.  She exercises fairly regularly and feels well without chest pain or shortness of breath.  She denies palpitations, lightheadedness, lower extremity edema.  She states her father had a myocardial infarction in his 70s although he used to smoke and was a .  Prior TSH has been unremarkable.  Most recent lipid panel 5/21/2024 demonstrate LDL cholesterol 146 mg/dL with HDL 48 mg/dL, triglycerides 123 mg/dL.               Social History:    Non-smoker          Vitals:  Vitals:    07/16/24 1455   BP: 124/76   BP Location: Right arm   Patient Position: Sitting   Cuff  "Size: Large   Pulse: 71   Weight: 84.6 kg (186 lb 9.6 oz)   Height: 5' 2\" (1.575 m)         Past Medical History:   Diagnosis Date   • Achilles tendinitis of right lower extremity     last assessed - 01Jun2016   • Allergic Seasonal   • Arthritis Back and hips   • Generalized anxiety disorder 06/06/2019   • Hyperlipidemia     last assessed - [id number]   • Sciatica, left side     last assessed - 22Apr2014     Social History     Socioeconomic History   • Marital status: Single     Spouse name: Not on file   • Number of children: Not on file   • Years of education: Not on file   • Highest education level: Not on file   Occupational History   • Not on file   Tobacco Use   • Smoking status: Never   • Smokeless tobacco: Never   Vaping Use   • Vaping status: Never Used   Substance and Sexual Activity   • Alcohol use: No   • Drug use: No   • Sexual activity: Not Currently     Partners: Male   Other Topics Concern   • Not on file   Social History Narrative   • Not on file     Social Determinants of Health     Financial Resource Strain: Low Risk  (12/12/2023)    Overall Financial Resource Strain (CARDIA)    • Difficulty of Paying Living Expenses: Not hard at all   Food Insecurity: Not on file   Transportation Needs: No Transportation Needs (12/12/2023)    PRAPARE - Transportation    • Lack of Transportation (Medical): No    • Lack of Transportation (Non-Medical): No   Physical Activity: Not on file   Stress: Not on file   Social Connections: Not on file   Intimate Partner Violence: Not on file   Housing Stability: Not on file      Family History   Problem Relation Age of Onset   • Diabetes Mother         After age 58   • Hypertension Mother    • Diabetes type II Mother         Type 2 diabetes mellitus   • Hypertension Father    • Heart disease Father         Myocardial ischemia   • Abdominal aortic aneurysm Father    • Heart defect Sister    • Stroke Sister    • Heart Valve Disease Sister         patent foramen ovale found  "   • Clotting disorder Sister    • Mental illness Sister    • Hyperlipidemia Sister    • Cancer Brother         Lung CA Smoker   • Diabetes type II Brother         Type 2 diabetes mellitus   • Hypertension Brother    • Hyperlipidemia Brother    • Heart disease Brother    • Diabetes Brother    • Thyroid disease Neg Hx      Past Surgical History:   Procedure Laterality Date   • COLONOSCOPY      Complete colonoscopy - nml; resolved - [id number]       Current Outpatient Medications:   •  acetaminophen (TYLENOL) 650 mg CR tablet, Take 1,300 mg by mouth as needed for mild pain, Disp: , Rfl:   •  calcium carbonate (OS-STELLA) 600 MG tablet, Take 600 mg by mouth 2 (two) times a day with meals PRN, Disp: , Rfl:   •  Coenzyme Q10 (COQ10 PO), Take by mouth, Disp: , Rfl:   •  gabapentin (NEURONTIN) 100 mg capsule, 2 tablets daily at bedtime, Disp: 180 capsule, Rfl: 1  •  GARLIC PO, Take by mouth, Disp: , Rfl:   •  naproxen (NAPROSYN) 500 mg tablet, TAKE 1 TABLET BY MOUTH TWICE DAILY WITH FOOD FOR 10 DAYS THEN AS NEEDED (Patient taking differently: as needed TAKE 1 TABLET BY MOUTH TWICE DAILY WITH FOOD FOR 10 DAYS THEN AS NEEDED), Disp: 30 tablet, Rfl: 0  •  psyllium (METAMUCIL) 0.52 g capsule, Take 0.52 g by mouth daily, Disp: , Rfl:   •  Red Yeast Rice Extract (RED YEAST RICE PO), Take by mouth, Disp: , Rfl:   •  rosuvastatin (CRESTOR) 5 mg tablet, Take 1 tablet (5 mg total) by mouth daily, Disp: 30 tablet, Rfl: 0        Review of Systems:  Review of Systems   Constitutional:  Negative for activity change, fever and unexpected weight change.   HENT:  Negative for facial swelling, nosebleeds and voice change.    Respiratory:  Negative for chest tightness, shortness of breath and wheezing.    Cardiovascular:  Negative for chest pain, palpitations and leg swelling.   Gastrointestinal:  Negative for abdominal distention.   Genitourinary:  Negative for hematuria.   Musculoskeletal:  Negative for arthralgias.   Skin:  Negative for color  change, pallor, rash and wound.   Neurological:  Negative for dizziness, seizures and syncope.   Psychiatric/Behavioral:  Negative for agitation.          Physical Exam:  Physical Exam  Vitals reviewed.   Constitutional:       Appearance: She is well-developed.   HENT:      Head: Normocephalic and atraumatic.   Cardiovascular:      Rate and Rhythm: Normal rate and regular rhythm.      Heart sounds: Normal heart sounds.   Pulmonary:      Effort: Pulmonary effort is normal.      Breath sounds: Normal breath sounds.   Abdominal:      Palpations: Abdomen is soft.   Musculoskeletal:         General: Normal range of motion.      Cervical back: Normal range of motion and neck supple.   Skin:     General: Skin is warm and dry.   Neurological:      Mental Status: She is alert and oriented to person, place, and time.   Psychiatric:         Behavior: Behavior normal.         Thought Content: Thought content normal.         Judgment: Judgment normal.         This note was completed in part utilizing [employer].  Grammatical errors, random word insertions, spelling mistakes, and incomplete sentences can be an occasional consequence of this system secondary to software limitations, ambient noise, and hardware issues.  If you have any questions or concerns about the content, text, or information contained within the body of this dictation, please contact the provider for clarification.

## 2024-07-18 ENCOUNTER — OFFICE VISIT (OUTPATIENT)
Dept: PHYSICAL THERAPY | Facility: MEDICAL CENTER | Age: 69
End: 2024-07-18
Payer: MEDICARE

## 2024-07-18 DIAGNOSIS — M25.551 RIGHT HIP PAIN: Primary | ICD-10-CM

## 2024-07-18 PROCEDURE — 97112 NEUROMUSCULAR REEDUCATION: CPT | Performed by: PHYSICAL THERAPIST

## 2024-07-18 PROCEDURE — 97140 MANUAL THERAPY 1/> REGIONS: CPT | Performed by: PHYSICAL THERAPIST

## 2024-07-18 PROCEDURE — 97110 THERAPEUTIC EXERCISES: CPT | Performed by: PHYSICAL THERAPIST

## 2024-07-18 NOTE — PROGRESS NOTES
Daily Note     Today's date: 2024  Patient name: Kaya Connor  : 1955  MRN: [id number]  Referring provider: Camila Sepulveda DO  Dx:   Encounter Diagnosis     ICD-10-CM    1. Right hip pain  M25.551                      Subjective: Patient states that she feels good.      Objective: See treatment diary below.      Assessment:  Patient demonstrates good tolerance to tx c/ decreased hip pain overall and good progress c/ functional activities.  Tolerated treatment well. Patient would benefit from continued PT.      Plan: Continue per plan of care.      Precautions:  None      Manuals 7/3 7/11 7/15 7/18         R hip long axis distraction AZ Grade IV AZ Grade IV AZ Grade IV AZ Grade IV         R hip mobs c/ belt (lateral distraction, ER, IR) AZ AZ AZ AZ         STM/MFR AZ AZ           Iliopsoas/iliacus release  AZ AZ AZ         Piriformis release AZ AZ           UPAs L4-S1 AZ AZ AZ AZ         Neuro Re-Ed             TA isometrics 30x5s 30x5s 30x5s 30x5s                                                Ther Ex                          Hip add 30x5s c/ TA 3x10 5s c/ TA 3x10 5s c/ TA 2x10 5s          Hip abd  3x15 5s blue 3x15 5s blue 3x10 5s blue         Bridges  2x10 c/ ball 2x10 c/ ball 2x10 c/ ball         Clams 3x10 5s 3x10 blue 3x10 blue 3x10 blue         SL hip abd  3x10 5s 3x10 5s 3x10 5s                                   Ther Activity                                       Gait Training                                       Modalities              10' 10' 10'

## 2024-07-22 ENCOUNTER — APPOINTMENT (OUTPATIENT)
Dept: PHYSICAL THERAPY | Facility: MEDICAL CENTER | Age: 69
End: 2024-07-22
Payer: MEDICARE

## 2024-07-24 ENCOUNTER — HOSPITAL ENCOUNTER (OUTPATIENT)
Dept: CT IMAGING | Facility: HOSPITAL | Age: 69
Discharge: HOME | End: 2024-07-24
Attending: INTERNAL MEDICINE
Payer: COMMERCIAL

## 2024-07-24 DIAGNOSIS — E78.5 DYSLIPIDEMIA: ICD-10-CM

## 2024-07-24 PROCEDURE — 75571 CT HRT W/O DYE W/CA TEST: CPT

## 2024-07-25 ENCOUNTER — OFFICE VISIT (OUTPATIENT)
Dept: PHYSICAL THERAPY | Facility: MEDICAL CENTER | Age: 69
End: 2024-07-25
Payer: MEDICARE

## 2024-07-25 DIAGNOSIS — M25.551 RIGHT HIP PAIN: Primary | ICD-10-CM

## 2024-07-25 PROCEDURE — 97140 MANUAL THERAPY 1/> REGIONS: CPT | Performed by: PHYSICAL THERAPIST

## 2024-07-25 PROCEDURE — 97110 THERAPEUTIC EXERCISES: CPT | Performed by: PHYSICAL THERAPIST

## 2024-07-25 PROCEDURE — 97112 NEUROMUSCULAR REEDUCATION: CPT | Performed by: PHYSICAL THERAPIST

## 2024-07-25 NOTE — PROGRESS NOTES
Daily Note     Today's date: 2024  Patient name: Kaya Connor  : 1955  MRN: [id number]  Referring provider: Camila Sepulveda DO  Dx:   Encounter Diagnosis     ICD-10-CM    1. Right hip pain  M25.551                      Subjective: Patient states that she felt good over the weekend, but felt sick on Monday.  She is doing well now c/ decreased hip pain overall.      Objective: See treatment diary below.      Assessment:  Patient demonstrates good tolerance to progressions.  Tolerated treatment well. Patient would benefit from continued PT.  She is going on vacation and will return in about 2 weeks for re-evaluation.      Plan: Continue per plan of care.      Precautions:  None      Manuals 7/3 7/11 7/15 7/18 7/25        R hip long axis distraction AZ Grade IV AZ Grade IV AZ Grade IV AZ Grade IV AZ Grade IV        R hip mobs c/ belt (lateral distraction, ER, IR) AZ AZ AZ AZ AZ        STM/MFR AZ AZ           Iliopsoas/iliacus release  AZ AZ AZ AZ        Piriformis release AZ AZ           UPAs L4-S1 AZ AZ AZ AZ AZ        Neuro Re-Ed             TA isometrics 30x5s 30x5s 30x5s 30x5s 30x5s                                               Ther Ex                          Hip add 30x5s c/ TA 3x10 5s c/ TA 3x10 5s c/ TA 2x10 5s  2x10 5s        Hip abd  3x15 5s blue 3x15 5s blue 3x10 5s blue 3x10 5s blue        Bridges  2x10 c/ ball 2x10 c/ ball 2x10 c/ ball 2x10 c/ ball        Clams 3x10 5s 3x10 blue 3x10 blue 3x10 blue 3x10 5s blue        SL hip abd  3x10 5s 3x10 5s 3x10 5s 3x10 5s                                   Ther Activity                                       Gait Training                                       Modalities              10' 10' 10' 10' 10'

## 2024-08-14 ENCOUNTER — OFFICE VISIT (OUTPATIENT)
Dept: CARDIOLOGY CLINIC | Facility: CLINIC | Age: 69
End: 2024-08-14
Payer: MEDICARE

## 2024-08-14 VITALS
WEIGHT: 185 LBS | HEIGHT: 63 IN | SYSTOLIC BLOOD PRESSURE: 122 MMHG | BODY MASS INDEX: 32.78 KG/M2 | DIASTOLIC BLOOD PRESSURE: 80 MMHG | HEART RATE: 87 BPM

## 2024-08-14 DIAGNOSIS — E78.5 DYSLIPIDEMIA: Primary | ICD-10-CM

## 2024-08-14 PROCEDURE — 99214 OFFICE O/P EST MOD 30 MIN: CPT | Performed by: INTERNAL MEDICINE

## 2024-08-14 RX ORDER — EZETIMIBE 10 MG/1
10 TABLET ORAL DAILY
Qty: 90 TABLET | Refills: 3 | Status: SHIPPED | OUTPATIENT
Start: 2024-08-14

## 2024-08-14 NOTE — PROGRESS NOTES
"      Cardiology             Kaya Connor  1955  [id number]              Assessment/Plan:    Dyslipidemia  Elevated ASCVD risk, moderate      Very low calcium score of 1 on 7/20/2024.  Patient likely at low risk of ASCVD.  She did not tolerate atorvastatin or simvastatin due to myalgias and abdominal bloating.  Will try ezetimibe 10 mg p.o. daily to try to get LDL cholesterol less than 100 mg/dL, and hold off on pushing statin therapy at this time given very low calcium score.  Currently diet, exercise has been encouraged.  She does exercise regularly and feels that her diet is optimized.  She may request her endocrinologist to prescribe Wegovy.      Follow-up in 6 months after repeat lipid panel        Interval History:     This is a 69-year-old female with dyslipidemia who has tried simvastatin in the past, around 2019 with resultant myalgias.  She admits to history of fibromyalgia and has been on gabapentin for neuropathic pain.    During her initial visit 7/16/2024 she was instructed to take atorvastatin every other day which caused significant bloating.  She discontinued this on her own.    Calcium score 7/24/2024 was 1.    She presents today for follow-up with no new complaints.                 Social History:    Non-smoker          Vitals:  Vitals:    08/14/24 1114   BP: 122/80   Pulse: 87   Weight: 83.9 kg (185 lb)   Height: 5' 3\" (1.6 m)         Past Medical History:   Diagnosis Date   • Achilles tendinitis of right lower extremity     last assessed - 01Jun2016   • Allergic Seasonal   • Arthritis Back and hips   • Generalized anxiety disorder 06/06/2019   • Hyperlipidemia     last assessed - [id number]   • Sciatica, left side     last assessed - 22Apr2014     Social History     Socioeconomic History   • Marital status: Single     Spouse name: Not on file   • Number of children: Not on file   • Years of education: Not on file   • Highest education level: Not on file   Occupational History   • Not on file "   Tobacco Use   • Smoking status: Never   • Smokeless tobacco: Never   Vaping Use   • Vaping status: Never Used   Substance and Sexual Activity   • Alcohol use: No   • Drug use: No   • Sexual activity: Not Currently     Partners: Male   Other Topics Concern   • Not on file   Social History Narrative   • Not on file     Social Determinants of Health     Financial Resource Strain: Low Risk  (12/12/2023)    Overall Financial Resource Strain (CARDIA)    • Difficulty of Paying Living Expenses: Not hard at all   Food Insecurity: Not on file   Transportation Needs: No Transportation Needs (12/12/2023)    PRAPARE - Transportation    • Lack of Transportation (Medical): No    • Lack of Transportation (Non-Medical): No   Physical Activity: Not on file   Stress: Not on file   Social Connections: Not on file   Intimate Partner Violence: Not on file   Housing Stability: Not on file      Family History   Problem Relation Age of Onset   • Diabetes Mother         After age 58   • Hypertension Mother    • Diabetes type II Mother         Type 2 diabetes mellitus   • Hypertension Father    • Heart disease Father         Myocardial ischemia   • Abdominal aortic aneurysm Father    • Heart defect Sister    • Stroke Sister    • Heart Valve Disease Sister         patent foramen ovale found    • Clotting disorder Sister    • Mental illness Sister    • Hyperlipidemia Sister    • Cancer Brother         Lung CA Smoker   • Diabetes type II Brother         Type 2 diabetes mellitus   • Hypertension Brother    • Hyperlipidemia Brother    • Heart disease Brother    • Diabetes Brother    • Thyroid disease Neg Hx      Past Surgical History:   Procedure Laterality Date   • COLONOSCOPY      Complete colonoscopy - nml; resolved - [id number]       Current Outpatient Medications:   •  acetaminophen (TYLENOL) 650 mg CR tablet, Take 1,300 mg by mouth as needed for mild pain, Disp: , Rfl:   •  calcium carbonate (OS-STELLA) 600 MG tablet, Take 600 mg by mouth 2  (two) times a day with meals PRN, Disp: , Rfl:   •  Coenzyme Q10 (COQ10 PO), Take by mouth, Disp: , Rfl:   •  ezetimibe (ZETIA) 10 mg tablet, Take 1 tablet (10 mg total) by mouth daily, Disp: 90 tablet, Rfl: 3  •  gabapentin (NEURONTIN) 100 mg capsule, 2 tablets daily at bedtime, Disp: 180 capsule, Rfl: 1  •  naproxen (NAPROSYN) 500 mg tablet, TAKE 1 TABLET BY MOUTH TWICE DAILY WITH FOOD FOR 10 DAYS THEN AS NEEDED (Patient taking differently: as needed TAKE 1 TABLET BY MOUTH TWICE DAILY WITH FOOD FOR 10 DAYS THEN AS NEEDED), Disp: 30 tablet, Rfl: 0  •  psyllium (METAMUCIL) 0.52 g capsule, Take 0.52 g by mouth daily, Disp: , Rfl:   •  GARLIC PO, Take by mouth, Disp: , Rfl:   •  Red Yeast Rice Extract (RED YEAST RICE PO), Take by mouth, Disp: , Rfl:   •  rosuvastatin (CRESTOR) 5 mg tablet, Take 1 tablet (5 mg total) by mouth daily (Patient not taking: Reported on 8/14/2024), Disp: 30 tablet, Rfl: 0        Review of Systems:  Review of Systems   Constitutional:  Negative for activity change, fever and unexpected weight change.   HENT:  Negative for facial swelling, nosebleeds and voice change.    Respiratory:  Negative for chest tightness, shortness of breath and wheezing.    Cardiovascular:  Negative for chest pain, palpitations and leg swelling.   Gastrointestinal:  Negative for abdominal distention.   Genitourinary:  Negative for hematuria.   Musculoskeletal:  Negative for arthralgias.   Skin:  Negative for color change, pallor, rash and wound.   Neurological:  Negative for dizziness, seizures and syncope.   Psychiatric/Behavioral:  Negative for agitation.          Physical Exam:  Physical Exam  Vitals reviewed.   Constitutional:       Appearance: She is well-developed.   HENT:      Head: Normocephalic and atraumatic.   Cardiovascular:      Rate and Rhythm: Normal rate and regular rhythm.      Heart sounds: Normal heart sounds.   Pulmonary:      Effort: Pulmonary effort is normal.      Breath sounds: Normal breath  sounds.   Abdominal:      Palpations: Abdomen is soft.   Musculoskeletal:         General: Normal range of motion.      Cervical back: Normal range of motion and neck supple.   Skin:     General: Skin is warm and dry.   Neurological:      Mental Status: She is alert and oriented to person, place, and time.   Psychiatric:         Behavior: Behavior normal.         Thought Content: Thought content normal.         Judgment: Judgment normal.         This note was completed in part utilizing M-Modal Fluency Direct Software.  Grammatical errors, random word insertions, spelling mistakes, and incomplete sentences can be an occasional consequence of this system secondary to software limitations, ambient noise, and hardware issues.  If you have any questions or concerns about the content, text, or information contained within the body of this dictation, please contact the provider for clarification.

## 2024-08-15 ENCOUNTER — EVALUATION (OUTPATIENT)
Dept: PHYSICAL THERAPY | Facility: MEDICAL CENTER | Age: 69
End: 2024-08-15
Payer: MEDICARE

## 2024-08-15 DIAGNOSIS — M25.551 RIGHT HIP PAIN: Primary | ICD-10-CM

## 2024-08-15 PROCEDURE — 97164 PT RE-EVAL EST PLAN CARE: CPT | Performed by: PHYSICAL THERAPIST

## 2024-08-15 PROCEDURE — 97140 MANUAL THERAPY 1/> REGIONS: CPT | Performed by: PHYSICAL THERAPIST

## 2024-08-15 PROCEDURE — 97110 THERAPEUTIC EXERCISES: CPT | Performed by: PHYSICAL THERAPIST

## 2024-08-15 NOTE — PROGRESS NOTES
"Re-Eval    Today's date: 8/15/2024  Patient name: Kaya Connor  : 1955  MRN: [id number]  Referring provider: Camila Sepulveda DO  Dx:   Encounter Diagnosis     ICD-10-CM    1. Right hip pain  M25.551                      Assessment  Assessment details: Kaya Connor is a 70 y/o female who has been compliant c/ attending physical therapy with complaints of chronic R hip pain.  The patient has made progress towards all goals.  She was able to go on vacation and walk, as well as, negotiate stairs without difficulty.  Patient would like to continue c/ HEP at this time d/t upcoming travel.  She may benefit from skilled PT in the future as needed to allow for maximal function.    Impairments: abnormal gait, abnormal muscle firing, abnormal or restricted ROM, activity intolerance, impaired physical strength, lacks appropriate home exercise program, pain with function and weight-bearing intolerance  Understanding of Dx/Px/POC: good   Prognosis: good     Goals  Impairment Goals  - Pt I with initial HEP in 1-2 visits- achieved  - Improve ROM to WNL in 4-6 weeks- achieved  - Increase strength to 5/5 in all affected areas in 4-6 weeks- achieved     Functional Goals  - Increase Functional Status Measure to: goal status in 6-8 weeks- achieved  - Patient will be independent with comprehensive HEP in 6-8 weeks- achieved  - Patient will report decreased pain at time of discharge- achieved        Plan  D/c to HEP.     Subjective Evaluation     History of Present Illness  Mechanism of injury: Patient reports a hx of back and hip pain.  She has been going to [facility] 2-3x/week for personal training.  She has been seeing a chiropractor.  Decompression on her spine has been helping.  Her right hip started bothering her about 6 weeks ago after \"overdoing it\" at the gym.  She notes that sitting is bothersome. She recently traveled to [place] and sitting in the car increased symptoms.  Patient went to see Dr." Derick who referred her to an orthopedic surgeon at [facility].  X-rays revealed R hip OA.  She is taking Naproxen, Gabapentin, and Tylenol arthritis.  She has an appointment scheduled with an orthopedic surgeon at the end of August to discuss future KENDRICK.  She would like to be able to manage the right hip pain without sx for as long as possible.  Patient's goals are to have decreased pain, increase strength, and continue to be active.  Patient Goals  Patient goals for therapy: decreased pain and increased strength     Pain  Current pain ratin     Diagnostic Tests  Abnormal x-ray: R hip OA.  Treatments  Current treatment: physical therapy     Objective      Lumbar Screen  Lumbar range of motion within normal limits      Neurological Testing      Sensation      Intact     Passive Range of Motion   Left Hip   Normal passive range of motion     Right Hip   Normal passive range of motion     Strength/Myotome Testing      Left Hip   5/5 throughout     Right Hip   5/5 throughout        Precautions:  None      Manuals 7/3 7/11 7/15 7/18 7/25 8/15       R hip long axis distraction AZ Grade IV AZ Grade IV AZ Grade IV AZ Grade IV AZ Grade IV AZ Grade IV       R hip mobs c/ belt (lateral distraction, ER, IR) AZ AZ AZ AZ AZ AZ       STM/MFR AZ AZ           Iliopsoas/iliacus release  AZ AZ AZ AZ AZ       Piriformis release AZ AZ           UPAs L4-S1 AZ AZ AZ AZ AZ AZ       Neuro Re-Ed             TA isometrics 30x5s 30x5s 30x5s 30x5s 30x5s 20x5s                                              Ther Ex                          Hip add 30x5s c/ TA 3x10 5s c/ TA 3x10 5s c/ TA 2x10 5s  2x10 5s 20x5s       Hip abd  3x15 5s blue 3x15 5s blue 3x10 5s blue 3x10 5s blue        Bridges  2x10 c/ ball 2x10 c/ ball 2x10 c/ ball 2x10 c/ ball 20x c/ ball       Clams 3x10 5s 3x10 blue 3x10 blue 3x10 blue 3x10 5s blue        SL hip abd  3x10 5s 3x10 5s 3x10 5s 3x10 5s                                   Ther Activity                                        Gait Training                                       Modalities              10' 10' 10' 10' 10' 10'

## 2024-08-17 DIAGNOSIS — M54.41 ACUTE RIGHT-SIDED LOW BACK PAIN WITH RIGHT-SIDED SCIATICA: ICD-10-CM

## 2024-08-17 DIAGNOSIS — M51.36 DISC DEGENERATION, LUMBAR: ICD-10-CM

## 2024-08-18 RX ORDER — NAPROXEN 500 MG/1
TABLET ORAL
Qty: 30 TABLET | Refills: 5 | Status: SHIPPED | OUTPATIENT
Start: 2024-08-18

## 2024-09-11 ENCOUNTER — CONSULT (OUTPATIENT)
Dept: ENDOCRINOLOGY | Facility: CLINIC | Age: 69
End: 2024-09-11
Payer: MEDICARE

## 2024-09-11 VITALS
WEIGHT: 183.6 LBS | OXYGEN SATURATION: 99 % | BODY MASS INDEX: 32.53 KG/M2 | DIASTOLIC BLOOD PRESSURE: 80 MMHG | HEART RATE: 61 BPM | SYSTOLIC BLOOD PRESSURE: 120 MMHG | HEIGHT: 63 IN

## 2024-09-11 DIAGNOSIS — Z83.3 FAMILY HISTORY OF DIABETES MELLITUS: ICD-10-CM

## 2024-09-11 DIAGNOSIS — Z13.0 SCREENING FOR IRON DEFICIENCY ANEMIA: ICD-10-CM

## 2024-09-11 DIAGNOSIS — R53.82 CHRONIC FATIGUE: ICD-10-CM

## 2024-09-11 DIAGNOSIS — E66.09 CLASS 1 OBESITY DUE TO EXCESS CALORIES WITH SERIOUS COMORBIDITY AND BODY MASS INDEX (BMI) OF 33.0 TO 33.9 IN ADULT: Primary | ICD-10-CM

## 2024-09-11 DIAGNOSIS — E78.2 MIXED HYPERLIPIDEMIA: ICD-10-CM

## 2024-09-11 DIAGNOSIS — E55.9 VITAMIN D DEFICIENCY: ICD-10-CM

## 2024-09-11 PROCEDURE — 99204 OFFICE O/P NEW MOD 45 MIN: CPT

## 2024-09-11 RX ORDER — VITAMIN B COMPLEX
TABLET ORAL
COMMUNITY
Start: 2024-09-06

## 2024-09-11 RX ORDER — MULTIVITAMIN WITH IRON
TABLET ORAL
COMMUNITY

## 2024-09-11 RX ORDER — DEXAMETHASONE 1 MG
1 TABLET ORAL ONCE
Qty: 1 TABLET | Refills: 0 | Status: SHIPPED | OUTPATIENT
Start: 2024-09-11 | End: 2024-09-11

## 2024-09-11 NOTE — ASSESSMENT & PLAN NOTE
Currently being managed by cardiology.   She has elevated total cholesterol and LDL in the setting of Statin intolerance and has recently been unable to tolerate a trial of rosuvastatin.   Has since been started on Ezetimibe 10mg daily.

## 2024-09-11 NOTE — ASSESSMENT & PLAN NOTE
Presents with inability to lose weight.   Seen weight management [id number], lost 10lbs total during that time. Was initially on Bupropion, with the addition of Naltrexone at her last visit in September 2023. Due to intolerance to Naltrexone, patient stopped taking the medications and did not follow up with weight management.   She does endorse some improvement in stress levels while on Bupropione.   At this time it is unlikely that the cause of her inability to lose weight is endocrine-related, however she does endorse body aches, decreased muscle strength and did have mildly elevated serum glucose on fasting labs 2 years ago so may benefit from additional workup to exclude hypercortisolism.   Based on history, she does seem to be under eating and has excluded carbohydrate containing foods from her diet. I did discuss with her that this can contribute to her inability to lose weight due to slowing down her metabolism. Advised patient that she may benefit from seeing a nutritionist, and potentially seeing weight management again.     Plan:  Ambulatory referral to nutrition provided.   Discussed with patient she may benefit from seeing weight management again.   Given family history of DM will check HbA1C.   Dexamethasone suppression test ordered to rule out hypercortisolism.   Advised patient to take dexamethasone 1mg tablet at around 11pm, then get bloodwork between 7-9am. Patient verbalized understanding. Written instructions provided as well.   Will reach out to patient with results.   Next scheduled follow up pending results.

## 2024-09-11 NOTE — PROGRESS NOTES
Kaya Connor  69 y.o. Female MRN: [id number]  Encounter: [id number]    New Patient Consult Note    CC: fatigue, inability to lose weight    Referring Provider:  Yumiko Mack Do  [address] [facility]  [address]  [place] [id number]      ASSESSMENT AND PLAN  Assessment:  Kaya Connor is a 69 y.o. female with Class 1 obesity, chronic fatigue and hyperlipidemia    Plan:   1. Class 1 obesity due to excess calories with serious comorbidity and body mass index (BMI) of 33.0 to 33.9 in adult  Assessment & Plan:  Presents with inability to lose weight.   Seen weight management [id number], lost 10lbs total during that time. Was initially on Bupropion, with the addition of Naltrexone at her last visit in September 2023. Due to intolerance to Naltrexone, patient stopped taking the medications and did not follow up with weight management.   She does endorse some improvement in stress levels while on Bupropione.   At this time it is unlikely that the cause of her inability to lose weight is endocrine-related, however she does endorse body aches, decreased muscle strength and did have mildly elevated serum glucose on fasting labs 2 years ago so may benefit from additional workup to exclude hypercortisolism.   Based on history, she does seem to be under eating and has excluded carbohydrate containing foods from her diet. I did discuss with her that this can contribute to her inability to lose weight due to slowing down her metabolism. Advised patient that she may benefit from seeing a nutritionist, and potentially seeing weight management again.     Plan:  Ambulatory referral to nutrition provided.   Discussed with patient she may benefit from seeing weight management again.   Given family history of DM will check HbA1C.   Dexamethasone suppression test ordered to rule out hypercortisolism.   Advised patient to take dexamethasone 1mg tablet at around 11pm, then get bloodwork between 7-9am. Patient verbalized understanding.  Written instructions provided as well.   Will reach out to patient with results.   Next scheduled follow up pending results.     Orders:  -     Ambulatory Referral to Endocrinology  -     Ambulatory referral to Nutrition Services; Future  -     dexamethasone (DECADRON) 1 mg tablet; Take 1 tablet (1 mg total) by mouth 1 (one) time for 1 dose  -     Cortisol Level, AM Specimen; Future  -     Dexamethasone; Future  2. Family history of diabetes mellitus  -     Hemoglobin A1C; Future; Expected date: 09/25/2024  3. Mixed hyperlipidemia  Assessment & Plan:  Currently being managed by cardiology.   She has elevated total cholesterol and LDL in the setting of Statin intolerance and has recently been unable to tolerate a trial of rosuvastatin.   Has since been started on Ezetimibe 10mg daily.     Orders:  -     Ambulatory Referral to Endocrinology  4. Chronic fatigue  Assessment & Plan:  Endorses chronic fatigue, initially noticed around age 61, progressively worsening.   She endorses a history of snoring, however states that she has had a sleep study in the past which did not demonstrate sleep apnea. She does endorse poor sleep, noting that she gets approximately 5-6 hours nightly with multiple awakenings throughout the night which is likely contributing to her daytime fatigue. Has been considering following with sleep medicine again.   At this time I doubt that her fatigue is thyroid related given normal TSH.   She does take vitamin D3, approximately 3000 units daily, however will check vitamin D level to exclude vitamin D deficiency.   Will also check CBC and iron panel as patient does have a history of RLS and no recent CBC on file and may have underlying iron deficiency, particularly in the setting of what seems to be low caloric intake.   Orders:  -     Vitamin D 25 hydroxy; Future; Expected date: 09/25/2024  -     CBC and differential; Future; Expected date: 09/25/2024  5. Vitamin D deficiency  -     Vitamin D 25  hydroxy; Future; Expected date: 09/25/2024  6. Screening for iron deficiency anemia  -     Iron Panel (Includes Ferritin, Iron Sat%, Iron, and TIBC); Future; Expected date: 09/25/2024  -     CBC and differential; Future; Expected date: 09/25/2024            HISTORY OF PRESENT ILLNESS  Kaya Connor is a 69 y.o. female with a past medical history significant for obesity, hyperlipidemia, chronic fatigue, RLS who presents for initial evaluation of inability to lose weight. She is referred by her PCP, Dr. Mack.   She reports that at her heaviest, she weighed 193 pounds.  After seeing weight management, she has been able to lose approximately 10 pounds and has stated 183 pounds for the past year.  Her goal weight is 140 pounds.  She states that while at weight management, she was initially started on bupropion, and naltrexone was later added to her regimen.  She developed nausea and diarrhea after starting the naltrexone, and discontinued taking both that and the bupropion and has not seen weight management since.  She does note that while on bupropion, has noted improvement in stress levels.  She also had a , however has not been back to the gym since mid June after exacerbation of chronic hip pain. She has noted that most of her fat is located around her abdomen, and when she lose weight, the loss is most noticeable on her thighs. Denies the presence of violaceous striae.   Her diet consists of protein drinks-Atkins brand or Oik[employer] sugar free yogurt for breakfast. She may also have a V[employer] Energy drink. Usually skips lunch, or if at home will have salad with some type of protein. Dinner consists of protein with either salad or a vegetable. She uses small amounts of fat free/light dressings to season her salad. Does not have snacks between meals. Ольга has stopped eating fruits after she was told that they contained sugar. Her current partner has diabetes so she has been trying to adhere to a lower  carb diet. States that while seeing weight management she ate approximately 8238-4497 calories daily and used to track them in an roxanne. She does not currently track her meals, but notes that she feels unwell if she eats less than 1000 calories daily. Dines out 1-2 days per week.   She endorses poor sleep and reports sleeping 5-6 hours per night with intermittent awakening to use the restroom and drink water. Has had a sleepy study in the past which did not demonstrate sleep apnea. Given poor sleep, she is considering seeing sleep medicine again.   She also endorses  decreased focus and mild anhedonia, as well as fatigue, body aches and muscle weakness. Has also noticed constipation, cold intolerance, dry skin, brittle nails and hair thinning.   Denies tobacco use, drinks rarely - usually in a social setting. She does have a medical marijuana card and uses edibles 3 times per week to help with sleep. Patient is retired.         Review of Systems   Constitutional:  Positive for fatigue. Negative for activity change, appetite change, fever and unexpected weight change.   HENT:  Negative for congestion, postnasal drip and trouble swallowing.    Eyes:  Negative for photophobia and visual disturbance.   Respiratory:  Negative for cough, chest tightness, shortness of breath and wheezing.    Cardiovascular:  Negative for chest pain, palpitations and leg swelling.   Gastrointestinal:  Positive for constipation. Negative for abdominal pain, diarrhea, nausea and vomiting.   Endocrine: Positive for cold intolerance. Negative for polydipsia and polyuria.   Genitourinary:  Negative for difficulty urinating, dysuria, frequency and hematuria.   Musculoskeletal:  Negative for arthralgias, back pain and myalgias.   Skin:  Negative for color change, pallor and rash.   Neurological:  Negative for dizziness, facial asymmetry, weakness, light-headedness, numbness and headaches.   Psychiatric/Behavioral:  Negative for agitation, behavioral  problems, confusion and dysphoric mood.    All other systems reviewed and are negative.        Past Medical History:   Diagnosis Date    Achilles tendinitis of right lower extremity     last assessed - 01Jun2016    Allergic Seasonal    Arthritis Back and hips    Generalized anxiety disorder 06/06/2019    Hyperlipidemia     last assessed - [id number]    Sciatica, left side     last assessed - 22Apr2014       Past Surgical History:   Procedure Laterality Date    COLONOSCOPY      Complete colonoscopy - nml; resolved - [id number]       Allergies   Allergen Reactions    Statins Myalgia       [unfilled]    Family History   Problem Relation Age of Onset    Diabetes Mother         After age 58    Hypertension Mother     Diabetes type II Mother         Type 2 diabetes mellitus    Hypertension Father     Heart disease Father         Myocardial ischemia    Abdominal aortic aneurysm Father     Heart defect Sister     Stroke Sister     Heart Valve Disease Sister         patent foramen ovale found     Clotting disorder Sister     Mental illness Sister     Hyperlipidemia Sister     Cancer Brother         Lung CA Smoker    Diabetes type II Brother         Type 2 diabetes mellitus    Hypertension Brother     Hyperlipidemia Brother     Heart disease Brother     Diabetes Brother     Thyroid disease Neg Hx        Social History     Socioeconomic History    Marital status: Single     Spouse name: None    Number of children: None    Years of education: None    Highest education level: None   Occupational History    None   Tobacco Use    Smoking status: Never    Smokeless tobacco: Never   Vaping Use    Vaping status: Never Used   Substance and Sexual Activity    Alcohol use: No    Drug use: No    Sexual activity: Not Currently     Partners: Male   Other Topics Concern    None   Social History Narrative    None     Social Determinants of Health     Financial Resource Strain: Low Risk  (12/12/2023)    Overall Financial Resource Strain (CARDIA)  "    Difficulty of Paying Living Expenses: Not hard at all   Food Insecurity: Not on file   Transportation Needs: No Transportation Needs (12/12/2023)    PRAPARE - Transportation     Lack of Transportation (Medical): No     Lack of Transportation (Non-Medical): No   Physical Activity: Not on file   Stress: Not on file   Social Connections: Not on file   Intimate Partner Violence: Not on file   Housing Stability: Not on file         OBJECTIVE  Visit Vitals  /80   Pulse 61   Ht 5' 3\" (1.6 m)   Wt 83.3 kg (183 lb 9.6 oz)   SpO2 99%   BMI 32.52 kg/m²   OB Status Postmenopausal   Smoking Status Never   BSA 1.86 m²         Physical Exam  Vitals and nursing note reviewed.   Constitutional:       General: She is not in acute distress.     Appearance: Normal appearance. She is obese. She is not ill-appearing, toxic-appearing or diaphoretic.      Comments: Central obesity noted.    HENT:      Head: Normocephalic and atraumatic.      Nose: Nose normal.      Mouth/Throat:      Mouth: Mucous membranes are moist.      Pharynx: Oropharynx is clear.   Eyes:      General: No scleral icterus.        Right eye: No discharge.         Left eye: No discharge.      Extraocular Movements: Extraocular movements intact.      Conjunctiva/sclera: Conjunctivae normal.   Neck:      Comments: Mild dorsocervical fat pad  Cardiovascular:      Rate and Rhythm: Normal rate and regular rhythm.      Pulses: Normal pulses.      Heart sounds: Normal heart sounds. No murmur heard.  Pulmonary:      Effort: Pulmonary effort is normal. No respiratory distress.      Breath sounds: Normal breath sounds. No wheezing, rhonchi or rales.   Abdominal:      General: Abdomen is flat. Bowel sounds are normal. There is no distension.      Palpations: Abdomen is soft.      Tenderness: There is no abdominal tenderness. There is no guarding or rebound.   Musculoskeletal:      Cervical back: Normal range of motion and neck supple.      Right lower leg: No edema.      " Left lower leg: No edema.   Skin:     General: Skin is warm and dry.      Coloration: Skin is not jaundiced or pale.   Neurological:      Mental Status: She is alert and oriented to person, place, and time. Mental status is at baseline.      Comments: Motor strength 3/5 in the proximal muscles of the upper extremities, 4/5 in the proximal muscles of the lower extremities.    Psychiatric:         Mood and Affect: Mood normal.         Behavior: Behavior normal.         Thought Content: Thought content normal.         Judgment: Judgment normal.           Lab Data:      Imaging:   No orders to display         Discussed with the patient and all questioned fully answered. She will call me if any problems arise. 32

## 2024-09-11 NOTE — PATIENT INSTRUCTIONS
Please obtain fasting labs. Instrictions are provided below:     a tablet of dexamethasone from the pharmacy      Take the 1 mg dexamethasone tablet at 11 PM.      Obtain bloodwork by 9 AM the next morning, for serum cortisol. The bloodwork must be done early in the morning, otherwise the effect of the tablet will wear off.    We will reach out to you with results  Depending on lab results, we can determine next scheduled follow up.

## 2024-09-11 NOTE — ASSESSMENT & PLAN NOTE
Endorses chronic fatigue, initially noticed around age 61, progressively worsening.   She endorses a history of snoring, however states that she has had a sleep study in the past which did not demonstrate sleep apnea. She does endorse poor sleep, noting that she gets approximately 5-6 hours nightly with multiple awakenings throughout the night which is likely contributing to her daytime fatigue. Has been considering following with sleep medicine again.   At this time I doubt that her fatigue is thyroid related given normal TSH.   She does take vitamin D3, approximately 3000 units daily, however will check vitamin D level to exclude vitamin D deficiency.   Will also check CBC and iron panel as patient does have a history of RLS and no recent CBC on file and may have underlying iron deficiency, particularly in the setting of what seems to be low caloric intake.

## 2024-09-12 ENCOUNTER — APPOINTMENT (OUTPATIENT)
Dept: LAB | Facility: MEDICAL CENTER | Age: 69
End: 2024-09-12
Payer: MEDICARE

## 2024-09-12 DIAGNOSIS — E66.09 CLASS 1 OBESITY DUE TO EXCESS CALORIES WITH SERIOUS COMORBIDITY AND BODY MASS INDEX (BMI) OF 33.0 TO 33.9 IN ADULT: ICD-10-CM

## 2024-09-12 LAB — CORTIS AM PEAK SERPL-MCNC: <0.4 UG/DL (ref 6.7–22.6)

## 2024-09-12 PROCEDURE — 36415 COLL VENOUS BLD VENIPUNCTURE: CPT

## 2024-09-12 PROCEDURE — 82533 TOTAL CORTISOL: CPT

## 2024-09-12 PROCEDURE — 80299 QUANTITATIVE ASSAY DRUG: CPT

## 2024-09-23 DIAGNOSIS — R53.82 CHRONIC FATIGUE: Primary | ICD-10-CM

## 2024-09-23 LAB — DEXAMETHASONE SERPL-MCNC: 550 NG/DL

## 2024-09-26 ENCOUNTER — LAB (OUTPATIENT)
Dept: LAB | Facility: MEDICAL CENTER | Age: 69
End: 2024-09-26
Payer: MEDICARE

## 2024-09-26 DIAGNOSIS — Z13.0 SCREENING FOR IRON DEFICIENCY ANEMIA: ICD-10-CM

## 2024-09-26 DIAGNOSIS — Z83.3 FAMILY HISTORY OF DIABETES MELLITUS: ICD-10-CM

## 2024-09-26 DIAGNOSIS — E55.9 VITAMIN D DEFICIENCY: ICD-10-CM

## 2024-09-26 DIAGNOSIS — R53.82 CHRONIC FATIGUE: ICD-10-CM

## 2024-09-26 LAB
25(OH)D3 SERPL-MCNC: 45.2 NG/ML (ref 30–100)
BASOPHILS # BLD AUTO: 0.04 THOUSANDS/ΜL (ref 0–0.1)
BASOPHILS NFR BLD AUTO: 1 % (ref 0–1)
CORTIS AM PEAK SERPL-MCNC: 23 UG/DL (ref 6.7–22.6)
EOSINOPHIL # BLD AUTO: 0.19 THOUSAND/ΜL (ref 0–0.61)
EOSINOPHIL NFR BLD AUTO: 3 % (ref 0–6)
ERYTHROCYTE [DISTWIDTH] IN BLOOD BY AUTOMATED COUNT: 12.2 % (ref 11.6–15.1)
EST. AVERAGE GLUCOSE BLD GHB EST-MCNC: 105 MG/DL
FERRITIN SERPL-MCNC: 51 NG/ML (ref 11–307)
HBA1C MFR BLD: 5.3 %
HCT VFR BLD AUTO: 44.4 % (ref 34.8–46.1)
HGB BLD-MCNC: 14.6 G/DL (ref 11.5–15.4)
IMM GRANULOCYTES # BLD AUTO: 0.01 THOUSAND/UL (ref 0–0.2)
IMM GRANULOCYTES NFR BLD AUTO: 0 % (ref 0–2)
IRON SATN MFR SERPL: 20 % (ref 15–50)
IRON SERPL-MCNC: 62 UG/DL (ref 50–212)
LYMPHOCYTES # BLD AUTO: 3.1 THOUSANDS/ΜL (ref 0.6–4.47)
LYMPHOCYTES NFR BLD AUTO: 45 % (ref 14–44)
MCH RBC QN AUTO: 31.9 PG (ref 26.8–34.3)
MCHC RBC AUTO-ENTMCNC: 32.9 G/DL (ref 31.4–37.4)
MCV RBC AUTO: 97 FL (ref 82–98)
MONOCYTES # BLD AUTO: 0.54 THOUSAND/ΜL (ref 0.17–1.22)
MONOCYTES NFR BLD AUTO: 8 % (ref 4–12)
NEUTROPHILS # BLD AUTO: 2.97 THOUSANDS/ΜL (ref 1.85–7.62)
NEUTS SEG NFR BLD AUTO: 43 % (ref 43–75)
NRBC BLD AUTO-RTO: 0 /100 WBCS
PLATELET # BLD AUTO: 286 THOUSANDS/UL (ref 149–390)
PMV BLD AUTO: 11.7 FL (ref 8.9–12.7)
RBC # BLD AUTO: 4.58 MILLION/UL (ref 3.81–5.12)
TIBC SERPL-MCNC: 315 UG/DL (ref 250–450)
UIBC SERPL-MCNC: 253 UG/DL (ref 155–355)
WBC # BLD AUTO: 6.85 THOUSAND/UL (ref 4.31–10.16)

## 2024-09-26 PROCEDURE — 82306 VITAMIN D 25 HYDROXY: CPT

## 2024-09-26 PROCEDURE — 83550 IRON BINDING TEST: CPT

## 2024-09-26 PROCEDURE — 85025 COMPLETE CBC W/AUTO DIFF WBC: CPT

## 2024-09-26 PROCEDURE — 82728 ASSAY OF FERRITIN: CPT

## 2024-09-26 PROCEDURE — 36415 COLL VENOUS BLD VENIPUNCTURE: CPT

## 2024-09-26 PROCEDURE — 83540 ASSAY OF IRON: CPT

## 2024-09-26 PROCEDURE — 83036 HEMOGLOBIN GLYCOSYLATED A1C: CPT

## 2024-09-26 PROCEDURE — 82533 TOTAL CORTISOL: CPT | Performed by: INTERNAL MEDICINE

## 2024-09-27 ENCOUNTER — TELEPHONE (OUTPATIENT)
Age: 69
End: 2024-09-27

## 2024-09-27 ENCOUNTER — TELEPHONE (OUTPATIENT)
Dept: ENDOCRINOLOGY | Facility: CLINIC | Age: 69
End: 2024-09-27

## 2024-09-27 NOTE — TELEPHONE ENCOUNTER
Patient called to schedule  medical follow up. Warm Transferred  to [facility] for further assistance.

## 2024-09-27 NOTE — TELEPHONE ENCOUNTER
Reached out to Ольга to discuss lab results. Her CBC, Hemoglobin A1C, Iron panel and vitamin D levels are normal. She did get a repeat AM cortisol which was slightly above reference range. Suspect this is likely due to patient's sleep difficulties and multiple nighttime awakenings. Her dexamethasone suppression test demonstrated appropriate suppression, which rules out Cushing's.   She states that she has recently started taking 4 cholecalciferol capsules daily, which is equivalent to 4000IU. I advised her to cut back to 2 capsules daily as her vitamin D levels were above 30ng/dL. Cautioned patient about hypervitaminosis D.   Ольга did tell me that she has been frustrated with the lack of weight loss despite averaging 900 calories per day. I did tell her that I am concerned that she is not eating enough which can actually further slow down her metabolism and advised her to follow up with weight management. She does maintain a food log which I recommended she bring to her next appointment.   Lastly, she did note some improvement in her sleep patterns after initiation of magnesium, stating that she is waking up less during the night. She also recently started taking THC gummies and thinks they help with sleep as well. She is considering following up with sleep medicine again, to which I agree as difficulty sleeping can interfere with her efforts to lose weight.

## 2024-10-09 ENCOUNTER — OFFICE VISIT (OUTPATIENT)
Dept: BARIATRICS | Facility: CLINIC | Age: 69
End: 2024-10-09

## 2024-10-09 VITALS
WEIGHT: 180.6 LBS | SYSTOLIC BLOOD PRESSURE: 116 MMHG | DIASTOLIC BLOOD PRESSURE: 70 MMHG | TEMPERATURE: 97.5 F | HEART RATE: 88 BPM | BODY MASS INDEX: 32 KG/M2 | RESPIRATION RATE: 16 BRPM | HEIGHT: 63 IN

## 2024-10-09 DIAGNOSIS — E66.811 CLASS 1 OBESITY DUE TO EXCESS CALORIES WITH SERIOUS COMORBIDITY AND BODY MASS INDEX (BMI) OF 33.0 TO 33.9 IN ADULT: ICD-10-CM

## 2024-10-09 DIAGNOSIS — E66.09 CLASS 1 OBESITY DUE TO EXCESS CALORIES WITH SERIOUS COMORBIDITY AND BODY MASS INDEX (BMI) OF 33.0 TO 33.9 IN ADULT: ICD-10-CM

## 2024-10-09 DIAGNOSIS — E78.2 MIXED HYPERLIPIDEMIA: ICD-10-CM

## 2024-10-09 DIAGNOSIS — E66.811 OBESITY, CLASS I, BMI 30-34.9: Primary | ICD-10-CM

## 2024-10-09 RX ORDER — TIRZEPATIDE 2.5 MG/.5ML
2.5 INJECTION, SOLUTION SUBCUTANEOUS WEEKLY
Qty: 2 ML | Refills: 0 | Status: SHIPPED | OUTPATIENT
Start: 2024-10-09

## 2024-10-09 NOTE — ASSESSMENT & PLAN NOTE
After discussion with endocrinology she is interested in treatment GLP-1 agonist  Discussed that medicare will not cover Wegovy or Zepbound   Discussed option of Zepbound Cash pay program and she is interested  She is comfortable with vial/syringe and she was given link to review video on website.   Medication Contract Signed -- Goal for 5% weight loss is 171 lbs.   Discussed expected weight loss of approximately 20% along with lifestyle modifications  Continue Current Diet/Exercise plan-- she declines referral to RD at this time.   9132-3059 calorie diet seems appropriate for her along with her current carb/protein intake for 80-100g per day  and exercise.   Discussed risks/side effects of medication.   Recommend small/low fat meals and stop eating when full to avoid side effects.  Discussed importance of adequate protein intake and strength training to reduce risk of muscle loss.   Discussed need to stop medication for at least 1 week prior to planned surgery/endoscopy  Denies hx Pancreatitis or FH of Medullary cell Thyroid CA/MEN2 syndrome  Start Zepbound  2.5mg Vial  weekly x 4 weeks  Advised to contact office in 2 weeks with update regarding tolerability and at that time will increase to 5mg dose if tolerating medication with no significant side effects.      In the past she reports wellbutrin was helpful as far as mood, can reconsider in the future but if starting for purposes of depression/anxiety and not weight loss would suggest obtaining RX through her PCP rather than weight management.

## 2024-10-09 NOTE — ASSESSMENT & PLAN NOTE
Following with cardiology, has not started statins.  Taking Zetia.  Will benefit from weight loss.

## 2024-10-09 NOTE — PROGRESS NOTES
Assessment/Plan:    Problem List Items Addressed This Visit          Other    Mixed hyperlipidemia     Following with cardiology, has not started statins.  Taking Zetia.  Will benefit from weight loss.          Class 1 obesity due to excess calories with serious comorbidity and body mass index (BMI) of 33.0 to 33.9 in adult     After discussion with endocrinology she is interested in treatment GLP-1 agonist  Discussed that medicare will not cover Wegovy or Zepbound   Discussed option of Zepbound Cash pay program and she is interested  She is comfortable with vial/syringe and she was given link to review video on website.   Medication Contract Signed -- Goal for 5% weight loss is 171 lbs.   Discussed expected weight loss of approximately 20% along with lifestyle modifications  Continue Current Diet/Exercise plan-- she declines referral to RD at this time.   6402-4568 calorie diet seems appropriate for her along with her current carb/protein intake for 80-100g per day  and exercise.   Discussed risks/side effects of medication.   Recommend small/low fat meals and stop eating when full to avoid side effects.  Discussed importance of adequate protein intake and strength training to reduce risk of muscle loss.   Discussed need to stop medication for at least 1 week prior to planned surgery/endoscopy  Denies hx Pancreatitis or FH of Medullary cell Thyroid CA/MEN2 syndrome  Start Zepbound  2.5mg Vial  weekly x 4 weeks  Advised to contact office in 2 weeks with update regarding tolerability and at that time will increase to 5mg dose if tolerating medication with no significant side effects.      In the past she reports wellbutrin was helpful as far as mood, can reconsider in the future but if starting for purposes of depression/anxiety and not weight loss would suggest obtaining RX through her PCP rather than weight management.               Other Visit Diagnoses       Obesity, Class I, BMI 30-34.9    -  Primary    Relevant  Medications    Tirzepatide-Weight Management (Zepbound) 2.5 MG/0.5ML SOLN               Subjective:   Chief Complaint   Patient presents with    Follow-up     MWM-OD F/u;Waist- 39 Inches       Patient ID: Kaya Connor  is a 69 y.o. female with excess weight/obesity here for Weight Management Follow up Visit    HPI: Here for Medical Weight Management Follow Up Visit  Interested in medication for weight loss, interested in treatment with GLP1 agonist.     Has recently been seen by endocrinology to rule out endocrine causes of weight gain  TSH normal, Dexamethasone suppression testing showed appropriate response.   Baseline AM cortisol was slightly high, it was suggested that may be related to poor sleep/pain and repeat was suggested in the future.   Previously had sleep study showing no sleep apnea.     + Hyperlipidemia, on Zetia, has not tolerated statins, has a  very low Cardiac Calcium score, Follows with Cardiology  Normal EKG 7/16/2024  Previously treated with Wellbutrin and Naltrexone  Wellbutrin did help with stress/mood, did not tolerate naltrexone  GLP1 not covered due to insurance (Medicare)    Partner is logging all food intake, was trying to stick with 900 calories per day, but was told that was too low at visit with endocrinology on 9/11/2024.  Now sticking to 9287-0587 calories per day  and 100g protein per day, average 87g carbs per day.   Saw  through chiropractor-- was doing exercise 3x per week and watching diet  Has been to [place], [place], [place] but has watched diet while travelling.   Usually has protein shakes, salad with protein. Does not drink sweetened beverages-- mostly water and some unsweetened regular or green tea.     Obesity/Excess Weight:  Current BMI: Body mass index is 31.99 kg/m².     Initial Weight: 193.5  Last visit Weight: 182 on 9/22/2023  Lowest 178 recently   Current Weight: 180 lbs    Lifestyle History:  Alcohol: rare, had champagne toast at son's wedding.  "  Exercise: Walking, previously worked with  3x per week and used to go to [employer] gym, vigorous exercise limited due to hip pain.   Occupation: Coordinates insurance exams.   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      Wt Readings from Last 20 Encounters:   10/09/24 81.9 kg (180 lb 9.6 oz)   09/11/24 83.3 kg (183 lb 9.6 oz)   08/14/24 83.9 kg (185 lb)   07/16/24 84.6 kg (186 lb 9.6 oz)   07/09/24 83.4 kg (183 lb 12.8 oz)   12/12/23 83.1 kg (183 lb 3.2 oz)   11/22/23 82.6 kg (182 lb 3.2 oz)   09/22/23 82.7 kg (182 lb 6.4 oz)   06/12/23 84.8 kg (187 lb)   06/08/23 84.5 kg (186 lb 2.9 oz)   06/02/23 84.8 kg (187 lb)   04/03/23 86.5 kg (190 lb 9.6 oz)   01/11/23 84.1 kg (185 lb 6.4 oz)   12/06/22 83.9 kg (185 lb)   11/09/22 86 kg (189 lb 8 oz)   09/15/22 85.7 kg (189 lb)   07/14/22 87.8 kg (193 lb 8 oz)   04/07/22 85.3 kg (188 lb)   03/21/22 87.3 kg (192 lb 6 oz)   11/30/21 87.2 kg (192 lb 3.2 oz)        Review of Systems   Musculoskeletal:  Positive for arthralgias (hip pain, will need surgery.) and back pain.   Psychiatric/Behavioral:  Positive for sleep disturbance.        Past Medical History:   Diagnosis Date    Achilles tendinitis of right lower extremity     last assessed - 01Jun2016    Allergic Seasonal    Arthritis Back and hips    Generalized anxiety disorder 06/06/2019    Hyperlipidemia     last assessed - [id number]    Sciatica, left side     last assessed - 22Apr2014       Past Surgical History:   Procedure Laterality Date    COLONOSCOPY      Complete colonoscopy - nml; resolved - [id number]        Allergies   Allergen Reactions    Statins Myalgia        Objective:    /70 (BP Location: Left arm, Patient Position: Sitting)   Pulse 88   Temp 97.5 °F (36.4 °C) (Tympanic)   Resp 16   Ht 5' 3\" (1.6 m)   Wt 81.9 kg (180 lb 9.6 oz)   BMI 31.99 kg/m²     Physical Exam    LABS:    Lab Results   Component Value Date    HGBA1C 5.3 09/26/2024      Lab " Results   Component Value Date    SODIUM 143 05/21/2024    K 3.8 05/21/2024     05/21/2024    CO2 26 05/21/2024    AGAP 10 05/21/2024    BUN 22 05/21/2024    CREATININE 0.94 05/21/2024    GLUC 90 10/23/2020    GLUF 94 05/21/2024    CALCIUM 9.1 05/21/2024    AST 19 05/21/2024    ALT 17 05/21/2024    ALKPHOS 52 05/21/2024    TP 6.8 05/21/2024    TBILI 0.59 05/21/2024    EGFR 62 05/21/2024 05/21/2024   Lab Results   Component Value Date    MKS9DNDYUYAC 1.644 05/21/2024    TSH 1.36 10/23/2020

## 2024-10-09 NOTE — PATIENT INSTRUCTIONS
[employer] does have a [employer] pay option.  The starting dose of Zepbound 2.5mg is $399 for the first month.  After that, the 5mg dose is available for $549 per month.   This is a vial/syringe rather than a pen.

## 2024-10-11 ENCOUNTER — TELEPHONE (OUTPATIENT)
Age: 69
End: 2024-10-11

## 2024-11-05 DIAGNOSIS — E66.811 OBESITY, CLASS I, BMI 30-34.9: Primary | ICD-10-CM

## 2024-11-05 RX ORDER — TIRZEPATIDE 5 MG/.5ML
5 INJECTION, SOLUTION SUBCUTANEOUS WEEKLY
Qty: 2 ML | Refills: 1 | Status: SHIPPED | OUTPATIENT
Start: 2024-11-05

## 2024-12-03 ENCOUNTER — TELEPHONE (OUTPATIENT)
Age: 69
End: 2024-12-03

## 2024-12-03 NOTE — TELEPHONE ENCOUNTER
Patient of Daryl Bradley PA-C. She is currently on the Zepboung 5 mg. Doing well. Having some abdominal bloating and constipation. Has lost weight. Is asking if the dose should be increased now or will you discuss this with her at her appointment on Tuesday?

## 2024-12-03 NOTE — TELEPHONE ENCOUNTER
I would suggest That she continue with the 5mg dose for now and we will discuss further at the 12/10 visit  If she is losing weight on the 5mg dose and having mild side effects I would prefer to stick with 5 to avoid worsening side effects but we can discuss in detail at the visit. thanks

## 2024-12-10 ENCOUNTER — OFFICE VISIT (OUTPATIENT)
Age: 69
End: 2024-12-10
Payer: MEDICARE

## 2024-12-10 ENCOUNTER — OFFICE VISIT (OUTPATIENT)
Dept: BARIATRICS | Facility: CLINIC | Age: 69
End: 2024-12-10

## 2024-12-10 VITALS
SYSTOLIC BLOOD PRESSURE: 126 MMHG | TEMPERATURE: 97.8 F | DIASTOLIC BLOOD PRESSURE: 77 MMHG | OXYGEN SATURATION: 98 % | WEIGHT: 169.4 LBS | HEIGHT: 63 IN | HEART RATE: 89 BPM | BODY MASS INDEX: 30.02 KG/M2

## 2024-12-10 VITALS — HEIGHT: 63 IN | BODY MASS INDEX: 29.95 KG/M2 | WEIGHT: 169 LBS

## 2024-12-10 DIAGNOSIS — E66.811 OBESITY, CLASS I, BMI 30-34.9: ICD-10-CM

## 2024-12-10 DIAGNOSIS — E78.2 MIXED HYPERLIPIDEMIA: Primary | ICD-10-CM

## 2024-12-10 DIAGNOSIS — M16.11 PRIMARY OSTEOARTHRITIS OF RIGHT HIP: Primary | ICD-10-CM

## 2024-12-10 DIAGNOSIS — M25.551 RIGHT HIP PAIN: ICD-10-CM

## 2024-12-10 PROCEDURE — 99204 OFFICE O/P NEW MOD 45 MIN: CPT | Performed by: STUDENT IN AN ORGANIZED HEALTH CARE EDUCATION/TRAINING PROGRAM

## 2024-12-10 RX ORDER — TIRZEPATIDE 5 MG/.5ML
5 INJECTION, SOLUTION SUBCUTANEOUS WEEKLY
Qty: 2 ML | Refills: 3 | Status: SHIPPED | OUTPATIENT
Start: 2024-12-10

## 2024-12-10 NOTE — ASSESSMENT & PLAN NOTE
She is following cardiology and  is taking Zetia, has not tolerated statins.   Continue dietary modifications and weight loss.

## 2024-12-10 NOTE — PROGRESS NOTES
Assessment & Plan  Obesity, Class I, BMI 30-34.9  Continue Zepbound 5mg weekly-- she is getting vials through [employer] program.   She has reached >5% weight loss goal  Continue Current Meal Plan ~1200 Calories per day  Increase activity as tolerated (Exercise currently less due to hip pain)  Discussed increased fiber intake to 25g per day with diet/supplements to manage constipation.   Discussed use of miralax if needed to manage constipation to avoid reliance on Senokot, Contact office if worsening constipation  Mixed hyperlipidemia  She is following cardiology and  is taking Zetia, has not tolerated statins.   Continue dietary modifications and weight loss.          Return in about 3 months (around 3/10/2025).         Subjective:   Chief Complaint   Patient presents with   • Follow-up     2 mo mwm fup Waist - 37.5       Patient ID: Kaya Connor  is a 69 y.o. female with excess weight/obesity here for Weight Management Follow up Visit    HPI: Here for Medical Weight Management Follow Up Visit    Obesity/Excess Weight:  Current BMI: Body mass index is 30.01 kg/m².   Initial Weight: 195  Last visit Weight: 180  Current Weight: 169    Current Weight Management Plan:   Current Medication: Zepbound 5mg per week  Medication Side effects:  Constipation  Bms used to be daily, now BMS every 3 days or so, has occasionally used senokot.   Had abdominal pain after first injection, now doing better.     Calorie Intake:  1200 calories per day   Was trying to get 100g protein a day, but has lowered this a bit to see if it helps with constipation.     Food Recall:  Breakfast: Atkins protein shake, V8 drink.   Lunch: Usually Salad with tuna or shrimp.   Dinner: Chicken or steak  On thanksgiving, took about a tablespoon of each and had a croissant roll, did not have any side effects.   Hydration: Drinking a lot of water, lemon water, green tea, poppi       Lifestyle History:  Exercise: Active, but  Less exercise due to hip  "pain.   Sleep: Remains fragmented, continues with fatigue.       Wt Readings from Last 20 Encounters:   12/10/24 76.8 kg (169 lb 6.4 oz)   10/09/24 81.9 kg (180 lb 9.6 oz)   09/11/24 83.3 kg (183 lb 9.6 oz)   08/14/24 83.9 kg (185 lb)   07/16/24 84.6 kg (186 lb 9.6 oz)   07/09/24 83.4 kg (183 lb 12.8 oz)   12/12/23 83.1 kg (183 lb 3.2 oz)   11/22/23 82.6 kg (182 lb 3.2 oz)   09/22/23 82.7 kg (182 lb 6.4 oz)   06/12/23 84.8 kg (187 lb)   06/08/23 84.5 kg (186 lb 2.9 oz)   06/02/23 84.8 kg (187 lb)   04/03/23 86.5 kg (190 lb 9.6 oz)   01/11/23 84.1 kg (185 lb 6.4 oz)   12/06/22 83.9 kg (185 lb)   11/09/22 86 kg (189 lb 8 oz)   09/15/22 85.7 kg (189 lb)   07/14/22 87.8 kg (193 lb 8 oz)   04/07/22 85.3 kg (188 lb)   03/21/22 87.3 kg (192 lb 6 oz)        Review of Systems   Constitutional:  Positive for fatigue.   Musculoskeletal:  Positive for arthralgias.       Past Medical History:   Diagnosis Date   • Achilles tendinitis of right lower extremity     last assessed - 01Jun2016   • Allergic Seasonal   • Arthritis Back and hips   • Generalized anxiety disorder 06/06/2019   • Hyperlipidemia     last assessed - [id number]   • Sciatica, left side     last assessed - 22Apr2014       Past Surgical History:   Procedure Laterality Date   • COLONOSCOPY      Complete colonoscopy - nml; resolved - [id number]        Allergies   Allergen Reactions   • Statins Myalgia        Objective:    /77 (BP Location: Left arm, Patient Position: Sitting, Cuff Size: Adult)   Pulse 89   Temp 97.8 °F (36.6 °C) (Tympanic)   Ht 5' 3\" (1.6 m)   Wt 76.8 kg (169 lb 6.4 oz)   SpO2 98%   BMI 30.01 kg/m²     Physical Exam:  Constitutional:  she  appears well-developed and well-nourished. No distress.   HENT:   Head: Normocephalic and atraumatic.   Neck: Normal range of motion.   Pulmonary/Chest: Effort normal.   Musculoskeletal: Normal range of motion.   Neurological: she  is alert and oriented to person, place, and time.   Skin: she  is not " diaphoretic.   Psychiatric: she  has a normal mood and affect. her  behavior is normal.        LABS:    Lab Results   Component Value Date    HGBA1C 5.3 09/26/2024      Lab Results   Component Value Date    SODIUM 143 05/21/2024    K 3.8 05/21/2024     05/21/2024    CO2 26 05/21/2024    AGAP 10 05/21/2024    BUN 22 05/21/2024    CREATININE 0.94 05/21/2024    GLUC 90 10/23/2020    GLUF 94 05/21/2024    CALCIUM 9.1 05/21/2024    AST 19 05/21/2024    ALT 17 05/21/2024    ALKPHOS 52 05/21/2024    TP 6.8 05/21/2024    TBILI 0.59 05/21/2024    EGFR 62 05/21/2024 05/21/2024   Lab Results   Component Value Date    RQV2IPIKYVIH 1.644 05/21/2024    TSH 1.36 10/23/2020

## 2024-12-10 NOTE — PROGRESS NOTES
Hip New Office Note    Assessment:     1. Primary osteoarthritis of right hip    2. Right hip pain        Plan:     Problem List Items Addressed This Visit    None  Visit Diagnoses         Primary osteoarthritis of right hip    -  Primary      Right hip pain               Findings today are consistent with bone on bone, right hip osteoarthritis. Imaging and prognosis was reviewed with the patient today. Discussed treatment options including continued observation, low impact exercises, anti-inflammatories, physical therapy, intra-articular cortisone injection, versus surgical intervention. We extensively reviewed KENDRICK in regards to pre-op and post-op course. She would like to take time to consider her options. She notes that she has a trip in late February 2025 and another in June 2025, so she would need to time surgery appropriately. She may use a shoe insert on the right as needed. Follow up as needed.    Subjective:     Patient ID: Kaya Connor is a 69 y.o. female.  Chief Complaint:  HPI:  69 y.o. female presents to the office for evaluation of right hip pain worsening over the past 2 years. She is experiencing lateral right hip pain that goes into her anterior thigh to the knee. Her pain worsens with activities. She notes that she cannot walk as long as she used to due to the dull/aching pain in her hip and some fatigue. She has tried physical therapy and trochanteric bursa cortisone injections with minimal relief. She takes naproxen and gabapentin to control her pain. She is interested in discussing further treatment options. To note she was seen by Pavel Teran and discussed posterior approach KENDRICK. She is looking for second opinion and to discuss anterior KENDRICK.     Allergy:  Allergies   Allergen Reactions    Statins Myalgia     Medications:  all current active meds have been reviewed  Past Medical History:  Past Medical History:   Diagnosis Date    Achilles tendinitis of right lower extremity     last assessed  - [id number]    Allergic Seasonal    Arthritis Back and hips    Generalized anxiety disorder 06/06/2019    Hyperlipidemia     last assessed - [id number]    Sciatica, left side     last assessed - [id number]     Past Surgical History:  Past Surgical History:   Procedure Laterality Date    COLONOSCOPY      Complete colonoscopy - nml; resolved - [id number]     Family History:  Family History   Problem Relation Age of Onset    Diabetes Mother         After age 58    Hypertension Mother     Diabetes type II Mother         Type 2 diabetes mellitus    Hypertension Father     Heart disease Father         Myocardial ischemia    Abdominal aortic aneurysm Father     Heart defect Sister     Stroke Sister     Heart Valve Disease Sister         patent foramen ovale found     Clotting disorder Sister     Mental illness Sister     Hyperlipidemia Sister     Cancer Brother         Lung CA Smoker    Diabetes type II Brother         Type 2 diabetes mellitus    Hypertension Brother     Hyperlipidemia Brother     Heart disease Brother     Diabetes Brother     Arthritis Sister         Had a RNR and has rt shoulder arthritis.    Arthritis Sister         Had a RNR    Thyroid disease Neg Hx      Social History:  Social History     Substance and Sexual Activity   Alcohol Use No     Social History     Substance and Sexual Activity   Drug Use No     Social History     Tobacco Use   Smoking Status Never   Smokeless Tobacco Never         ROS:  General: Per HPI  Skin: Negative, except if noted below  HEENT: Negative  Respiratory: Negative  Cardiovascular: Negative  Gastrointestinal: Negative  Urinary: Negative  Vascular: Negative  Musculoskeletal: Positive per HPI   Neurologic: Positive per HPI  Endocrine: Negative    Objective:  BP Readings from Last 1 Encounters:   12/10/24 126/77      Wt Readings from Last 1 Encounters:   12/10/24 76.7 kg (169 lb)        Respiratory:   non-labored respirations    Lymphatics:  no palpable lymph nodes    Gait and  "Station:   Antalgic    Neurologic:   Alert and oriented times 3  Patient with normal sensation except as noted below  Deep tendon reflexes 2+ except as noted in MSK exam    Bilateral Lower Extremity:  Left Hip     Inspection: skin intact    Range of Motion: full without pain    - Trendelenburg sign    Motor: 5/5 Q/HS/TA/GS/P    Pulses: 2+ DP / 2+ PT    SILT DP/SP/S/S/TN    Right Hip     Inspection: skin intact    Range of Motion: limited with severe pain notably with IR    - Trendelenburg sign    Motor: 5/5 Q/HS/TA/GS/P    Pulses: 2+ DP / 2+ PT    SILT DP/SP/S/S/TN    Imaging:  My interpretation XR AP pelvis/ right hip: severe joint space narrowing, subchondral sclerosis, subchondral cysts, osteophyte formation. Bone on bone changes. No fracture or dislocation.     BMI:   Estimated body mass index is 29.94 kg/m² as calculated from the following:    Height as of this encounter: 5' 3\" (1.6 m).    Weight as of this encounter: 76.7 kg (169 lb).  BSA:   Estimated body surface area is 1.8 meters squared as calculated from the following:    Height as of this encounter: 5' 3\" (1.6 m).    Weight as of this encounter: 76.7 kg (169 lb).           Scribe Attestation      I,:  Patito Laguerre am acting as a scribe while in the presence of the attending physician.:       I,:  Oleg Sotelo, DO personally performed the services described in this documentation    as scribed in my presence.:            "

## 2025-01-06 ENCOUNTER — TELEPHONE (OUTPATIENT)
Age: 70
End: 2025-01-06

## 2025-01-06 NOTE — TELEPHONE ENCOUNTER
Patient asking for labs to be ordered fro her. She states she needs the lipid profile, cortisol, cholesterol, vitamin D, CMP, etc

## 2025-01-13 ENCOUNTER — OFFICE VISIT (OUTPATIENT)
Age: 70
End: 2025-01-13
Payer: MEDICARE

## 2025-01-13 ENCOUNTER — APPOINTMENT (OUTPATIENT)
Age: 70
End: 2025-01-13
Payer: MEDICARE

## 2025-01-13 ENCOUNTER — TELEPHONE (OUTPATIENT)
Dept: CARDIOLOGY CLINIC | Facility: CLINIC | Age: 70
End: 2025-01-13

## 2025-01-13 ENCOUNTER — PREP FOR PROCEDURE (OUTPATIENT)
Age: 70
End: 2025-01-13

## 2025-01-13 VITALS — WEIGHT: 169 LBS | HEIGHT: 63 IN | BODY MASS INDEX: 29.95 KG/M2

## 2025-01-13 DIAGNOSIS — M25.561 RIGHT KNEE PAIN, UNSPECIFIED CHRONICITY: ICD-10-CM

## 2025-01-13 DIAGNOSIS — M16.11 PRIMARY OSTEOARTHRITIS OF RIGHT HIP: ICD-10-CM

## 2025-01-13 DIAGNOSIS — M25.59 PAIN IN OTHER JOINT: ICD-10-CM

## 2025-01-13 DIAGNOSIS — M25.562 LEFT KNEE PAIN, UNSPECIFIED CHRONICITY: ICD-10-CM

## 2025-01-13 DIAGNOSIS — M16.11 PRIMARY OSTEOARTHRITIS OF RIGHT HIP: Primary | ICD-10-CM

## 2025-01-13 PROCEDURE — 73502 X-RAY EXAM HIP UNI 2-3 VIEWS: CPT

## 2025-01-13 PROCEDURE — 99214 OFFICE O/P EST MOD 30 MIN: CPT | Performed by: STUDENT IN AN ORGANIZED HEALTH CARE EDUCATION/TRAINING PROGRAM

## 2025-01-13 PROCEDURE — 73564 X-RAY EXAM KNEE 4 OR MORE: CPT

## 2025-01-13 RX ORDER — ASCORBIC ACID 500 MG
500 TABLET ORAL 2 TIMES DAILY
Qty: 30 TABLET | Refills: 1 | Status: SHIPPED | OUTPATIENT
Start: 2025-01-13

## 2025-01-13 RX ORDER — CHLORHEXIDINE GLUCONATE ORAL RINSE 1.2 MG/ML
15 SOLUTION DENTAL ONCE
OUTPATIENT
Start: 2025-01-13 | End: 2025-01-13

## 2025-01-13 RX ORDER — ACETAMINOPHEN 325 MG/1
975 TABLET ORAL ONCE
OUTPATIENT
Start: 2025-01-13 | End: 2025-01-13

## 2025-01-13 RX ORDER — CHLORHEXIDINE GLUCONATE 40 MG/ML
SOLUTION TOPICAL DAILY PRN
OUTPATIENT
Start: 2025-01-13

## 2025-01-13 RX ORDER — METHYLPREDNISOLONE 4 MG/1
TABLET ORAL
Qty: 1 EACH | Refills: 0 | Status: SHIPPED | OUTPATIENT
Start: 2025-01-13 | End: 2025-01-14 | Stop reason: ALTCHOICE

## 2025-01-13 RX ORDER — FOLIC ACID 1 MG/1
1 TABLET ORAL DAILY
Qty: 30 TABLET | Refills: 1 | Status: SHIPPED | OUTPATIENT
Start: 2025-01-13

## 2025-01-13 RX ORDER — VITAMIN B COMPLEX
2000 TABLET ORAL DAILY
Qty: 60 TABLET | Refills: 1 | Status: SHIPPED | OUTPATIENT
Start: 2025-01-13

## 2025-01-13 RX ORDER — MUPIROCIN 20 MG/G
OINTMENT TOPICAL
Qty: 15 G | Refills: 1 | Status: SHIPPED | OUTPATIENT
Start: 2025-01-13

## 2025-01-13 RX ORDER — SODIUM CHLORIDE, SODIUM LACTATE, POTASSIUM CHLORIDE, CALCIUM CHLORIDE 600; 310; 30; 20 MG/100ML; MG/100ML; MG/100ML; MG/100ML
125 INJECTION, SOLUTION INTRAVENOUS CONTINUOUS
OUTPATIENT
Start: 2025-01-13

## 2025-01-13 RX ORDER — CELECOXIB 100 MG/1
200 CAPSULE ORAL 2 TIMES DAILY
Qty: 120 CAPSULE | Refills: 1 | Status: SHIPPED | OUTPATIENT
Start: 2025-01-13

## 2025-01-13 RX ORDER — MULTIVIT-MIN/IRON FUM/FOLIC AC 7.5 MG-4
1 TABLET ORAL DAILY
Qty: 30 TABLET | Refills: 1 | Status: SHIPPED | OUTPATIENT
Start: 2025-01-13

## 2025-01-13 NOTE — PROGRESS NOTES
Hip New Office Note    Assessment:     1. Primary osteoarthritis of right hip    2. Right knee pain, unspecified chronicity    3. Left knee pain, unspecified chronicity        Plan:     Problem List Items Addressed This Visit    None  Visit Diagnoses         Primary osteoarthritis of right hip    -  Primary    Relevant Orders    XR hip/pelv 2-3 vws right if performed      Right knee pain, unspecified chronicity        Relevant Orders    XR knee 4+ vw right injury    XR bone length (scanogram)      Left knee pain, unspecified chronicity        Relevant Orders    XR knee 4+ vw left injury    XR bone length (scanogram)           Findings today are consistent with bone on bone, right hip osteoarthritis. Imaging and prognosis was reviewed with the patient today. Reviewed that on physical exam her bilateral knee pain is likely due to some referred pain from the hip and her known lumbar pathology. Discussed treatment options including continued observation, low impact exercises, anti-inflammatories, physical therapy, intra-articular cortisone injection, versus surgical intervention. Prescribed celebrex to control her pain.  Prescribed medrol dose sandi for her cruise in February.     The patient has elected to proceed with right KENDRICK through the anterior approach. Risks and benefits of surgery to include but not limited to bleeding, infection, damage to surrounding structures, hardware failure, instability, fracture, dislocation, leg length inequality, need for further surgery, continued pain, stiffness, blood clots, stroke, heart attack, and LFCN numbness was discussed with the patient. Informed consent was signed today in the office. The patient has met with our surgical schedulers and our preoperative joint replacement pathway has been initiated. All questions were answered. Patient will follow-up 2 weeks post operatively.  Patient is a nonsmoker and acceptable BMI of 29.94.  Follow up after surgery    Subjective:      Patient ID: Kaya Connor is a 69 y.o. female.  Chief Complaint:  HPI:  69 y.o. female presents to the office for follow up of right hip osteoarthritis and evaluation of bilateral knee pain. She continues to experience lateral right hip pain that goes into her anterior thigh to the knee. Her hip pain worsens with activities. She notes that she cannot walk as long as she used to due to the dull/aching pain in her hip and some fatigue. She has tried physical therapy and trochanteric bursa cortisone injections with minimal relief. She has also been experiencing bilateral knee pain, right slightly worse than left. She is having medial and lateral knee pain that goes down her calf and shin. Her pain has been keeping her awake throughout the night. She takes naproxen, tylenol, and gabapentin to control her pain.     Of note she was seen by Pavel Teran and discussed posterior approach KENDRICK. At last visit we discussed anterior approach KENDRICK.     Allergy:  Allergies   Allergen Reactions    Statins Myalgia     Medications:  all current active meds have been reviewed  Past Medical History:  Past Medical History:   Diagnosis Date    Achilles tendinitis of right lower extremity     last assessed - 01Jun2016    Allergic Seasonal    Arthritis Back and hips    Generalized anxiety disorder 06/06/2019    Hyperlipidemia     last assessed - [id number]    Sciatica, left side     last assessed - [id number]     Past Surgical History:  Past Surgical History:   Procedure Laterality Date    COLONOSCOPY      Complete colonoscopy - nml; resolved - [id number]     Family History:  Family History   Problem Relation Age of Onset    Diabetes Mother         After age 58    Hypertension Mother     Diabetes type II Mother         Type 2 diabetes mellitus    Hypertension Father     Heart disease Father         Myocardial ischemia    Abdominal aortic aneurysm Father     Heart defect Sister     Stroke Sister     Heart Valve Disease Sister         patent  foramen ovale found     Clotting disorder Sister     Mental illness Sister     Hyperlipidemia Sister     Cancer Brother         Lung CA Smoker    Diabetes type II Brother         Type 2 diabetes mellitus    Hypertension Brother     Hyperlipidemia Brother     Heart disease Brother     Diabetes Brother     Arthritis Sister         Had a RNR and has rt shoulder arthritis.    Arthritis Sister         Had a RNR    Thyroid disease Neg Hx      Social History:  Social History     Substance and Sexual Activity   Alcohol Use No     Social History     Substance and Sexual Activity   Drug Use No     Social History     Tobacco Use   Smoking Status Never   Smokeless Tobacco Never         ROS:  General: Per HPI  Skin: Negative, except if noted below  HEENT: Negative  Respiratory: Negative  Cardiovascular: Negative  Gastrointestinal: Negative  Urinary: Negative  Vascular: Negative  Musculoskeletal: Positive per HPI   Neurologic: Positive per HPI  Endocrine: Negative    Objective:  BP Readings from Last 1 Encounters:   12/10/24 126/77      Wt Readings from Last 1 Encounters:   01/13/25 76.7 kg (169 lb)        Respiratory:   non-labored respirations    Lymphatics:  no palpable lymph nodes    Gait and Station:   Antalgic    Neurologic:   Alert and oriented times 3  Patient with normal sensation except as noted below  Deep tendon reflexes 2+ except as noted in MSK exam    Bilateral Lower Extremity:  Left Hip     Inspection: skin intact    Range of Motion: full without pain    - Trendelenburg sign    Motor: 5/5 Q/HS/TA/GS/P    Pulses: 2+ DP / 2+ PT    SILT DP/SP/S/S/TN    Right Hip     Inspection: skin intact    Range of Motion: limited with severe pain notably with IR    - Trendelenburg sign    Motor: 5/5 Q/HS/TA/GS/P    Pulses: 2+ DP / 2+ PT    SILT DP/SP/S/S/TN    Left Knee:      Inspection:  skin intact    Overall limb alignment neutral    Effusion: none    ROM 0-120 with pain    Extensor Lag: none    Palpation: medial Joint line  "tenderness to palpation    AP Stability at 90 deg stable    M/L stability in full extension stable    M/L stability in midflexion stable    Motor: 5/5 IP/Q/HS/TA/GS    Pulses: 2+ DP / 2+ PT    SILT DP/SP/S/S/TN    Right knee:     Inspection:  skin intact    Overall limb alignment neutral    Effusion: trace    ROM 0-120 with pain    Extensor Lag: none    Palpation: medial Joint line tenderness to palpation    AP Stability at 90 deg stable    M/L stability in full extension stable    M/L stability in midflexion stable    Motor: 5/5 IP/Q/HS/TA/GS    Pulses: 2+ DP / 2+ PT    SILT DP/SP/S/S/TN    + SLR    Imaging:  My interpretation XR AP pelvis/ right hip: severe joint space narrowing, subchondral sclerosis, subchondral cysts, osteophyte formation. Bone on bone changes. Large acetabular cyst. No fracture or dislocation.     My interpretation XR AP scanogram/AP bilateral knee/lateral/wyatt/sunrise bilateral knee: mild medial compartment joint space narrowing, subchondral sclerosis, subchondral cysts, osteophyte formation. No fracture or dislocation.    BMI:   Estimated body mass index is 29.94 kg/m² as calculated from the following:    Height as of this encounter: 5' 3\" (1.6 m).    Weight as of this encounter: 76.7 kg (169 lb).  BSA:   Estimated body surface area is 1.8 meters squared as calculated from the following:    Height as of this encounter: 5' 3\" (1.6 m).    Weight as of this encounter: 76.7 kg (169 lb).           Scribe Attestation      I,:  Patito Laguerre am acting as a scribe while in the presence of the attending physician.:       I,:  Oleg Sotelo DO personally performed the services described in this documentation    as scribed in my presence.:              "

## 2025-01-14 ENCOUNTER — OFFICE VISIT (OUTPATIENT)
Dept: FAMILY MEDICINE CLINIC | Facility: CLINIC | Age: 70
End: 2025-01-14
Payer: MEDICARE

## 2025-01-14 VITALS
WEIGHT: 168 LBS | DIASTOLIC BLOOD PRESSURE: 82 MMHG | OXYGEN SATURATION: 99 % | HEIGHT: 63 IN | SYSTOLIC BLOOD PRESSURE: 124 MMHG | BODY MASS INDEX: 29.77 KG/M2 | TEMPERATURE: 98.2 F | HEART RATE: 122 BPM

## 2025-01-14 DIAGNOSIS — M16.12 PRIMARY OSTEOARTHRITIS OF LEFT HIP: ICD-10-CM

## 2025-01-14 DIAGNOSIS — E66.3 OVERWEIGHT: ICD-10-CM

## 2025-01-14 DIAGNOSIS — M51.360 DEGENERATION OF INTERVERTEBRAL DISC OF LUMBAR REGION WITH DISCOGENIC BACK PAIN: ICD-10-CM

## 2025-01-14 DIAGNOSIS — E78.2 MIXED HYPERLIPIDEMIA: ICD-10-CM

## 2025-01-14 DIAGNOSIS — C44.311 BASAL CELL CARCINOMA OF NOSE: ICD-10-CM

## 2025-01-14 DIAGNOSIS — G25.81 RESTLESS LEG: ICD-10-CM

## 2025-01-14 DIAGNOSIS — Z78.0 ASYMPTOMATIC POSTMENOPAUSAL STATE: ICD-10-CM

## 2025-01-14 DIAGNOSIS — Z12.31 ENCOUNTER FOR SCREENING MAMMOGRAM FOR MALIGNANT NEOPLASM OF BREAST: ICD-10-CM

## 2025-01-14 DIAGNOSIS — Z00.00 MEDICARE ANNUAL WELLNESS VISIT, SUBSEQUENT: Primary | ICD-10-CM

## 2025-01-14 PROCEDURE — 99214 OFFICE O/P EST MOD 30 MIN: CPT | Performed by: FAMILY MEDICINE

## 2025-01-14 PROCEDURE — G0439 PPPS, SUBSEQ VISIT: HCPCS | Performed by: FAMILY MEDICINE

## 2025-01-14 NOTE — ASSESSMENT & PLAN NOTE
Due for labs She did not tolerate statins and is on zetia Check labs continue zetia and see cardiology  Orders:    Comprehensive metabolic panel; Future    Lipid panel; Future

## 2025-01-14 NOTE — ASSESSMENT & PLAN NOTE
Check dexa continue calcium and vitamin D   Orders:    DXA bone density spine hip and pelvis; Future

## 2025-01-14 NOTE — ASSESSMENT & PLAN NOTE
Patient to schedule with mammogram and dexa Patient to see eye doctor and dentist Patient to get me a copy of living will  I did recommend that she have zoster vaccine Patient declines covid shot

## 2025-01-14 NOTE — PROGRESS NOTES
Name: Kaya Connor      : 1955      MRN: [id number]  Encounter Provider: Yumiko Mack DO  Encounter Date: 2025   Encounter department: [facility] PRIMARY CARE    Assessment & Plan  Medicare annual wellness visit, subsequent  Patient to schedule with mammogram and dexa Patient to see eye doctor and dentist Patient to get me a copy of living will  I did recommend that she have zoster vaccine Patient declines covid shot       Mixed hyperlipidemia  Due for labs She did not tolerate statins and is on zetia Check labs continue zetia and see cardiology  Orders:    Comprehensive metabolic panel; Future    Lipid panel; Future    Overweight  Continue zepbound and bariatric follow up I will see her in 6 months          Primary osteoarthritis of left hip  Patient to take celebrex will have replacement done temporary handicap placard form filled out       Restless leg  Continue gabapentin consider sleep medicine consult       Degeneration of intervertebral disc of lumbar region with discogenic back pain  Followup with pain management        Encounter for screening mammogram for malignant neoplasm of breast  Check mammogram  Orders:    Mammo screening bilateral w 3d and cad; Future    Basal cell carcinoma of nose  Patient to see dermatology       Asymptomatic postmenopausal state  Check dexa continue calcium and vitamin D   Orders:    DXA bone density spine hip and pelvis; Future       Preventive health issues were discussed with patient, and age appropriate screening tests were ordered as noted in patient's After Visit Summary. Personalized health advice and appropriate referrals for health education or preventive services given if needed, as noted in patient's After Visit Summary.    History of Present Illness     Patient is here for medicare wellness and follow up of hyperlipidemia history of basal cell carcinoma restless leg DJD of left hip DJD of lumbar spine Patient has handicap placard  request as she is having hip surgery done in March Patient is still seeing bariatrics and taking zepbound Patient weight is stable Patient is due for labs and cardiology followup Patient is tolerating all medications She is due for dexa and mammogram She needs zoster shot She needs to get me a copy of living will She needs to schedule dermatology follow up Patient is struggling still with sleep despite the gabapentin She is going to see how celebrex does for her pain and if still with sleep issues then will see sleep medicine Patient has no other concerns She does not get to exercise due to her joint issues        Patient Care Team:  Yumiko Mack DO as PCP - General  Yuko Reyes MD as PCP - Endocrinology (Endocrinology)    Review of Systems   Constitutional:  Negative for fatigue, fever and unexpected weight change.   HENT:  Negative for congestion, sinus pain and trouble swallowing.    Eyes:  Negative for discharge and visual disturbance.   Respiratory:  Negative for cough, chest tightness, shortness of breath and wheezing.    Cardiovascular:  Negative for chest pain, palpitations and leg swelling.   Gastrointestinal:  Negative for abdominal pain, blood in stool, constipation, diarrhea, nausea and vomiting.   Genitourinary:  Negative for difficulty urinating, dysuria, frequency and hematuria.   Musculoskeletal:  Negative for arthralgias, gait problem and joint swelling.   Skin:  Negative for rash and wound.   Allergic/Immunologic: Negative for environmental allergies and food allergies.   Neurological:  Negative for dizziness, syncope, weakness, numbness and headaches.   Hematological:  Negative for adenopathy. Does not bruise/bleed easily.   Psychiatric/Behavioral:  Negative for confusion, decreased concentration and sleep disturbance. The patient is not nervous/anxious.      Medical History Reviewed by provider this encounter:  Tobacco  Allergies  Meds  Problems  Med Hx  Surg Hx  Fam Hx        Annual Wellness Visit Questionnaire   Kaya is here for her Subsequent Wellness visit.     Health Risk Assessment:   Patient rates overall health as good. Patient feels that their physical health rating is slightly worse. Patient is satisfied with their life. Eyesight was rated as same. Hearing was rated as slightly worse. Patient feels that their emotional and mental health rating is same. Patients states they are never, rarely angry. Patient states they are often unusually tired/fatigued. Pain experienced in the last 7 days has been some. Patient's pain rating has been 5/10. Patient states that she has experienced no weight loss or gain in last 6 months. DJD of hip    Depression Screening:   PHQ-2 Score: 1      Fall Risk Screening:   In the past year, patient has experienced: no history of falling in past year      Urinary Incontinence Screening:   Patient has not leaked urine accidently in the last six months.     Home Safety:  Patient does not have trouble with stairs inside or outside of their home. Patient has working smoke alarms and has working carbon monoxide detector. Home safety hazards include: none.     Nutrition:   Current diet is Low Cholesterol, Low Saturated Fat and Low Carb.     Medications:   Patient is currently taking over-the-counter supplements. OTC medications include: see medication list. Patient is able to manage medications.     Activities of Daily Living (ADLs)/Instrumental Activities of Daily Living (IADLs):   Walk and transfer into and out of bed and chair?: Yes  Dress and groom yourself?: Yes    Bathe or shower yourself?: Yes    Feed yourself? Yes  Do your laundry/housekeeping?: Yes  Manage your money, pay your bills and track your expenses?: Yes  Make your own meals?: Yes    Do your own shopping?: Yes    Previous Hospitalizations:   Any hospitalizations or ED visits within the last 12 months?: No      Advance Care Planning:   Living will: Yes    Durable POA for healthcare: Yes     Advanced directive: Yes      Cognitive Screening:   Provider or family/friend/caregiver concerned regarding cognition?: No    PREVENTIVE SCREENINGS      Cardiovascular Screening:    General: Screening Not Indicated and History Lipid Disorder      Diabetes Screening:     General: Screening Current      Colorectal Cancer Screening:     General: Screening Current      Breast Cancer Screening:     General: Screening Current      Cervical Cancer Screening:    General: Screening Not Indicated      Abdominal Aortic Aneurysm (AAA) Screening:    Risk factors include: family history of AAA        Lung Cancer Screening:     General: Screening Not Indicated      Hepatitis C Screening:    General: Screening Current    Screening, Brief Intervention, and Referral to Treatment (SBIRT)    Screening  Typical number of drinks in a day: 0  Typical number of drinks in a week: 0  Interpretation: Low risk drinking behavior.    AUDIT-C Screenin) How often did you have a drink containing alcohol in the past year? monthly or less  2) How many drinks did you have on a typical day when you were drinking in the past year? 1 to 2  3) How often did you have 6 or more drinks on one occasion in the past year? never    AUDIT-C Score: 1  Interpretation: Score 0-2 (female): Negative screen for alcohol misuse    Single Item Drug Screening:  How often have you used an illegal drug (including marijuana) or a prescription medication for non-medical reasons in the past year? never    Single Item Drug Screen Score: 0  Interpretation: Negative screen for possible drug use disorder    Social Drivers of Health     Financial Resource Strain: Low Risk  (2023)    Overall Financial Resource Strain (CARDIA)     Difficulty of Paying Living Expenses: Not hard at all   Food Insecurity: No Food Insecurity (2025)    Hunger Vital Sign     Worried About Running Out of Food in the Last Year: Never true     Ran Out of Food in the Last Year: Never true  "  Transportation Needs: No Transportation Needs (1/9/2025)    PRAPARE - Transportation     Lack of Transportation (Medical): No     Lack of Transportation (Non-Medical): No   Housing Stability: Low Risk  (1/9/2025)    Housing Stability Vital Sign     Unable to Pay for Housing in the Last Year: No     Number of Times Moved in the Last Year: 0     Homeless in the Last Year: No   Utilities: Not At Risk (1/9/2025)    [facility] Utilities     Threatened with loss of utilities: No     No results found.    Objective   /82   Pulse (!) 122   Temp 98.2 °F (36.8 °C)   Ht 5' 3\" (1.6 m)   Wt 76.2 kg (168 lb)   SpO2 99%   BMI 29.76 kg/m²     Physical Exam  Vitals and nursing note reviewed.   Constitutional:       Appearance: She is well-developed.   HENT:      Head: Normocephalic and atraumatic.      Right Ear: Hearing, tympanic membrane and external ear normal.      Left Ear: Hearing, tympanic membrane and external ear normal.   Eyes:      Conjunctiva/sclera: Conjunctivae normal.      Pupils: Pupils are equal, round, and reactive to light.   Neck:      Thyroid: No thyromegaly.   Cardiovascular:      Rate and Rhythm: Normal rate.      Heart sounds: Normal heart sounds.   Pulmonary:      Effort: Pulmonary effort is normal.      Breath sounds: Normal breath sounds. No wheezing or rales.   Abdominal:      General: Bowel sounds are normal. There is no distension.      Palpations: Abdomen is soft.      Tenderness: There is no abdominal tenderness.   Musculoskeletal:         General: No tenderness.      Cervical back: Neck supple.   Lymphadenopathy:      Cervical: No cervical adenopathy.   Skin:     General: Skin is warm and dry.      Findings: No rash.   Neurological:      Mental Status: She is alert and oriented to person, place, and time.      Cranial Nerves: No cranial nerve deficit.      Coordination: Coordination normal.   Psychiatric:         Behavior: Behavior normal.         Thought Content: Thought content normal.        "  Judgment: Judgment normal.

## 2025-01-14 NOTE — PATIENT INSTRUCTIONS
Medicare Preventive Visit Patient Instructions  Thank you for completing your Welcome to Medicare Visit or Medicare Annual Wellness Visit today. Your next wellness visit will be due in one year (1/15/2026).  The screening/preventive services that you may require over the next 5-10 years are detailed below. Some tests may not apply to you based off risk factors and/or age. Screening tests ordered at today's visit but not completed yet may show as past due. Also, please note that scanned in results may not display below.  Preventive Screenings:  Service Recommendations Previous Testing/Comments   Colorectal Cancer Screening  * Colonoscopy    * Fecal Occult Blood Test (FOBT)/Fecal Immunochemical Test (FIT)  * Fecal DNA/Cologuard Test  * Flexible Sigmoidoscopy Age: 45-75 years old   Colonoscopy: every 10 years (may be performed more frequently if at higher risk)  OR  FOBT/FIT: every 1 year  OR  Cologuard: every 3 years  OR  Sigmoidoscopy: every 5 years  Screening may be recommended earlier than age 45 if at higher risk for colorectal cancer. Also, an individualized decision between you and your healthcare provider will decide whether screening between the ages of 76-85 would be appropriate. Colonoscopy: 11/29/2018  FOBT/FIT: Not on file  Cologuard: Not on file  Sigmoidoscopy: Not on file    Screening Current     Breast Cancer Screening Age: 40+ years old  Frequency: every 1-2 years  Not required if history of left and right mastectomy Mammogram: 01/25/2024    Screening Current   Cervical Cancer Screening Between the ages of 21-29, pap smear recommended once every 3 years.   Between the ages of 30-65, can perform pap smear with HPV co-testing every 5 years.   Recommendations may differ for women with a history of total hysterectomy, cervical cancer, or abnormal pap smears in past. Pap Smear: 11/28/2018    Screening Not Indicated   Hepatitis C Screening Once for adults born between 1945 and 1965  More frequently in  patients at high risk for Hepatitis C Hep C Antibody: 10/23/2018    Screening Current   Diabetes Screening 1-2 times per year if you're at risk for diabetes or have pre-diabetes Fasting glucose: 94 mg/dL (5/21/2024)  A1C: 5.3 % (9/26/2024)  Screening Current   Cholesterol Screening Once every 5 years if you don't have a lipid disorder. May order more often based on risk factors. Lipid panel: 05/21/2024    Screening Not Indicated  History Lipid Disorder     Other Preventive Screenings Covered by Medicare:  Abdominal Aortic Aneurysm (AAA) Screening: covered once if your at risk. You're considered to be at risk if you have a family history of AAA.  Lung Cancer Screening: covers low dose CT scan once per year if you meet all of the following conditions: (1) Age 55-77; (2) No signs or symptoms of lung cancer; (3) Current smoker or have quit smoking within the last 15 years; (4) You have a tobacco smoking history of at least 20 pack years (packs per day multiplied by number of years you smoked); (5) You get a written order from a healthcare provider.  Glaucoma Screening: covered annually if you're considered high risk: (1) You have diabetes OR (2) Family history of glaucoma OR (3)  aged 50 and older OR (4)  American aged 65 and older  Osteoporosis Screening: covered every 2 years if you meet one of the following conditions: (1) You're estrogen deficient and at risk for osteoporosis based off medical history and other findings; (2) Have a vertebral abnormality; (3) On glucocorticoid therapy for more than 3 months; (4) Have primary hyperparathyroidism; (5) On osteoporosis medications and need to assess response to drug therapy.   Last bone density test (DXA Scan): 02/15/2022.  HIV Screening: covered annually if you're between the age of 15-65. Also covered annually if you are younger than 15 and older than 65 with risk factors for HIV infection. For pregnant patients, it is covered up to 3 times per  pregnancy.    Immunizations:  Immunization Recommendations   Influenza Vaccine Annual influenza vaccination during flu season is recommended for all persons aged >= 6 months who do not have contraindications   Pneumococcal Vaccine   * Pneumococcal conjugate vaccine = PCV13 (Prevnar 13), PCV15 (Vaxneuvance), PCV20 (Prevnar 20)  * Pneumococcal polysaccharide vaccine = PPSV23 (Pneumovax) Adults 19-65 yo with certain risk factors or if 65+ yo  If never received any pneumonia vaccine: recommend Prevnar 20 (PCV20)  Give PCV20 if previously received 1 dose of PCV13 or PPSV23   Hepatitis B Vaccine 3 dose series if at intermediate or high risk (ex: diabetes, end stage renal disease, liver disease)   Respiratory syncytial virus (RSV) Vaccine - COVERED BY MEDICARE PART D  * RSVPreF3 (Arexvy) CDC recommends that adults 60 years of age and older may receive a single dose of RSV vaccine using shared clinical decision-making (SCDM)   Tetanus (Td) Vaccine - COST NOT COVERED BY MEDICARE PART B Following completion of primary series, a booster dose should be given every 10 years to maintain immunity against tetanus. Td may also be given as tetanus wound prophylaxis.   Tdap Vaccine - COST NOT COVERED BY MEDICARE PART B Recommended at least once for all adults. For pregnant patients, recommended with each pregnancy.   Shingles Vaccine (Shingrix) - COST NOT COVERED BY MEDICARE PART B  2 shot series recommended in those 19 years and older who have or will have weakened immune systems or those 50 years and older     Health Maintenance Due:      Topic Date Due   • Breast Cancer Screening: Mammogram  01/25/2025   • Colorectal Cancer Screening  11/29/2028   • Hepatitis C Screening  Completed     Immunizations Due:      Topic Date Due   • COVID-19 Vaccine (4 - 2024-25 season) 09/01/2024     Advance Directives   What are advance directives?  Advance directives are legal documents that state your wishes and plans for medical care. These plans  are made ahead of time in case you lose your ability to make decisions for yourself. Advance directives can apply to any medical decision, such as the treatments you want, and if you want to donate organs.   What are the types of advance directives?  There are many types of advance directives, and each state has rules about how to use them. You may choose a combination of any of the following:  Living will:  This is a written record of the treatment you want. You can also choose which treatments you do not want, which to limit, and which to stop at a certain time. This includes surgery, medicine, IV fluid, and tube feedings.   Durable power of  for healthcare (DPAHC):  This is a written record that states who you want to make healthcare choices for you when you are unable to make them for yourself. This person, called a proxy, is usually a family member or a friend. You may choose more than 1 proxy.  Do not resuscitate (DNR) order:  A DNR order is used in case your heart stops beating or you stop breathing. It is a request not to have certain forms of treatment, such as CPR. A DNR order may be included in other types of advance directives.  Medical directive:  This covers the care that you want if you are in a coma, near death, or unable to make decisions for yourself. You can list the treatments you want for each condition. Treatment may include pain medicine, surgery, blood transfusions, dialysis, IV or tube feedings, and a ventilator (breathing machine).  Values history:  This document has questions about your views, beliefs, and how you feel and think about life. This information can help others choose the care that you would choose.  Why are advance directives important?  An advance directive helps you control your care. Although spoken wishes may be used, it is better to have your wishes written down. Spoken wishes can be misunderstood, or not followed. Treatments may be given even if you do not want  them. An advance directive may make it easier for your family to make difficult choices about your care.   Weight Management   Why it is important to manage your weight:  Being overweight increases your risk of health conditions such as heart disease, high blood pressure, type 2 diabetes, and certain types of cancer. It can also increase your risk for osteoarthritis, sleep apnea, and other respiratory problems. Aim for a slow, steady weight loss. Even a small amount of weight loss can lower your risk of health problems.  How to lose weight safely:  A safe and healthy way to lose weight is to eat fewer calories and get regular exercise. You can lose up about 1 pound a week by decreasing the number of calories you eat by 500 calories each day.   Healthy meal plan for weight management:  A healthy meal plan includes a variety of foods, contains fewer calories, and helps you stay healthy. A healthy meal plan includes the following:  Eat whole-grain foods more often.  A healthy meal plan should contain fiber. Fiber is the part of grains, fruits, and vegetables that is not broken down by your body. Whole-grain foods are healthy and provide extra fiber in your diet. Some examples of whole-grain foods are whole-wheat breads and pastas, oatmeal, brown rice, and bulgur.  Eat a variety of vegetables every day.  Include dark, leafy greens such as spinach, kale, jose j greens, and mustard greens. Eat yellow and orange vegetables such as carrots, sweet potatoes, and winter squash.   Eat a variety of fruits every day.  Choose fresh or canned fruit (canned in its own juice or light syrup) instead of juice. Fruit juice has very little or no fiber.  Eat low-fat dairy foods.  Drink fat-free (skim) milk or 1% milk. Eat fat-free yogurt and low-fat cottage cheese. Try low-fat cheeses such as mozzarella and other reduced-fat cheeses.  Choose meat and other protein foods that are low in fat.  Choose beans or other legumes such as split  peas or lentils. Choose fish, skinless poultry (chicken or turkey), or lean cuts of red meat (beef or pork). Before you cook meat or poultry, cut off any visible fat.   Use less fat and oil.  Try baking foods instead of frying them. Add less fat, such as margarine, sour cream, regular salad dressing and mayonnaise to foods. Eat fewer high-fat foods. Some examples of high-fat foods include french fries, doughnuts, ice cream, and cakes.  Eat fewer sweets.  Limit foods and drinks that are high in sugar. This includes candy, cookies, regular soda, and sweetened drinks.  Exercise:  Exercise at least 30 minutes per day on most days of the week. Some examples of exercise include walking, biking, dancing, and swimming. You can also fit in more physical activity by taking the stairs instead of the elevator or parking farther away from stores. Ask your healthcare provider about the best exercise plan for you.      © Copyright [employer] 2018 Information is for End User's use only and may not be sold, redistributed or otherwise used for commercial purposes. All illustrations and images included in CareNotes® are the copyrighted property of [employer].A.M., Inc. or [employer]

## 2025-01-14 NOTE — ASSESSMENT & PLAN NOTE
Patient to take celebrex will have replacement done temporary handicap placard form filled out

## 2025-01-20 ENCOUNTER — HOSPITAL ENCOUNTER (OUTPATIENT)
Dept: BONE DENSITY | Facility: MEDICAL CENTER | Age: 70
Discharge: HOME | End: 2025-01-20
Payer: MEDICARE

## 2025-01-20 VITALS — BODY MASS INDEX: 30.91 KG/M2 | WEIGHT: 168 LBS | HEIGHT: 62 IN

## 2025-01-20 DIAGNOSIS — Z78.0 ASYMPTOMATIC POSTMENOPAUSAL STATE: ICD-10-CM

## 2025-01-20 PROCEDURE — 77080 DXA BONE DENSITY AXIAL: CPT

## 2025-01-21 ENCOUNTER — RESULTS FOLLOW-UP (OUTPATIENT)
Dept: FAMILY MEDICINE CLINIC | Facility: CLINIC | Age: 70
End: 2025-01-21

## 2025-01-24 ENCOUNTER — APPOINTMENT (OUTPATIENT)
Dept: LAB | Facility: MEDICAL CENTER | Age: 70
End: 2025-01-24
Payer: MEDICARE

## 2025-01-24 DIAGNOSIS — E78.5 DYSLIPIDEMIA: ICD-10-CM

## 2025-01-24 DIAGNOSIS — M25.561 RIGHT KNEE PAIN, UNSPECIFIED CHRONICITY: ICD-10-CM

## 2025-01-24 DIAGNOSIS — E78.2 MIXED HYPERLIPIDEMIA: ICD-10-CM

## 2025-01-24 DIAGNOSIS — M25.59 PAIN IN OTHER JOINT: ICD-10-CM

## 2025-01-24 DIAGNOSIS — M16.11 PRIMARY OSTEOARTHRITIS OF RIGHT HIP: ICD-10-CM

## 2025-01-24 LAB
ALBUMIN SERPL BCG-MCNC: 4 G/DL (ref 3.5–5)
ALP SERPL-CCNC: 57 U/L (ref 34–104)
ALT SERPL W P-5'-P-CCNC: 12 U/L (ref 7–52)
ANION GAP SERPL CALCULATED.3IONS-SCNC: 9 MMOL/L (ref 4–13)
AST SERPL W P-5'-P-CCNC: 16 U/L (ref 13–39)
BILIRUB SERPL-MCNC: 0.47 MG/DL (ref 0.2–1)
BUN SERPL-MCNC: 18 MG/DL (ref 5–25)
CALCIUM SERPL-MCNC: 9.2 MG/DL (ref 8.4–10.2)
CHLORIDE SERPL-SCNC: 107 MMOL/L (ref 96–108)
CHOLEST SERPL-MCNC: 181 MG/DL (ref ?–200)
CO2 SERPL-SCNC: 25 MMOL/L (ref 21–32)
CREAT SERPL-MCNC: 0.8 MG/DL (ref 0.6–1.3)
EST. AVERAGE GLUCOSE BLD GHB EST-MCNC: 97 MG/DL
GFR SERPL CREATININE-BSD FRML MDRD: 75 ML/MIN/1.73SQ M
GLUCOSE P FAST SERPL-MCNC: 82 MG/DL (ref 65–99)
HBA1C MFR BLD: 5 %
HDLC SERPL-MCNC: 49 MG/DL
LDLC SERPL CALC-MCNC: 114 MG/DL (ref 0–100)
POTASSIUM SERPL-SCNC: 4.6 MMOL/L (ref 3.5–5.3)
PROT SERPL-MCNC: 6.7 G/DL (ref 6.4–8.4)
SODIUM SERPL-SCNC: 141 MMOL/L (ref 135–147)
TRIGL SERPL-MCNC: 89 MG/DL (ref ?–150)

## 2025-01-24 PROCEDURE — 80061 LIPID PANEL: CPT

## 2025-01-24 PROCEDURE — 36415 COLL VENOUS BLD VENIPUNCTURE: CPT

## 2025-01-24 PROCEDURE — 83036 HEMOGLOBIN GLYCOSYLATED A1C: CPT

## 2025-01-24 PROCEDURE — 80053 COMPREHEN METABOLIC PANEL: CPT

## 2025-01-27 ENCOUNTER — RESULTS FOLLOW-UP (OUTPATIENT)
Dept: FAMILY MEDICINE CLINIC | Facility: CLINIC | Age: 70
End: 2025-01-27

## 2025-01-28 ENCOUNTER — TELEPHONE (OUTPATIENT)
Dept: OBGYN CLINIC | Facility: HOSPITAL | Age: 70
End: 2025-01-28

## 2025-01-28 NOTE — TELEPHONE ENCOUNTER
Preoperative Elective Admission Assessment- spoke to patient     EKG/LAB/MRSA SWAB/CXR date: going week of 02/10 for cbc     Living Situation:    Who does pt live with: significant other  What kind of home: multi level   How do they enter the home: front  How many levels in home: 2  # of steps to enter home: 1  # of steps to second floor: 13  Are there handrails: Yes  Are there landings: No  Sleeping arrangement: discussed first floor for first week post op, patient will determine if steps are manageable for the first week   Where is Bathroom: 1/2 bath first floor, full bath second floor   Where is the tub or shower: tub shower   Toilet height? Concerns for low toilets - has elevated toilet and RTS        First Floor Setup:   Is there a bathroom: Yes  Where would pt sleep: bed     DME: has RW; advised to bring dos  We discussed clearing pathways in the home and making sure there is accessibly to use the walker, for example, removing throw rugs.      Patient's Current Level of Function: Ambulates: Independently    Post-op Caregiver: significant other  Caregiver Name and phone number for Inpatient discharge needs: Yves (sig other) [phone]  Currently receive any HHC/aides/community supports: No     Post-op Transport: significant other  To/from hospital: significant other  To/from PT 2-3x/week: significant other  Uses community transport now: No     Outpatient Physical Therapy Site:  Site: [place]  pre and post-op appts scheduled? Yes     Medication Management: self  Preferred Pharmacy for Post-op Medications: [id number] - [place] - [address] [[id number]]   Blood Management Vitamin Regimen: Pt confirms taking as prescribed- will start 30 days pre op   Post-op anticoagulant: to be determined by surgical team postoperatively  Has Bactroban for 5 days preop: Yes  Educated on Preoperative Bathing Instructions, and use of Soap for 5 days before surgery.      DC Plan: Pt plans to be discharged  home      Barriers to DC identified preoperatively: none identified    BMI: 30.48    Patient Education:  Pt educated on post-op pain, early mobilization (POD0), LOS goals, OP PT goals, and preoperative bathing. Patient educated that our goal is to appropriately discharge patient based off their post-op function while striving to maintain maximal independence. The goal is to discharge patient to home and for them to attend outpatient physical therapy.    Assigned to care team? Yes

## 2025-02-04 ENCOUNTER — NURSE TRIAGE (OUTPATIENT)
Age: 70
End: 2025-02-04

## 2025-02-04 ENCOUNTER — OFFICE VISIT (OUTPATIENT)
Age: 70
End: 2025-02-04
Payer: MEDICARE

## 2025-02-04 ENCOUNTER — APPOINTMENT (OUTPATIENT)
Age: 70
End: 2025-02-04
Payer: MEDICARE

## 2025-02-04 VITALS — BODY MASS INDEX: 30.91 KG/M2 | WEIGHT: 168 LBS | HEIGHT: 62 IN

## 2025-02-04 DIAGNOSIS — M25.552 LEFT HIP PAIN: ICD-10-CM

## 2025-02-04 DIAGNOSIS — M16.11 PRIMARY OSTEOARTHRITIS OF RIGHT HIP: Primary | ICD-10-CM

## 2025-02-04 PROCEDURE — 73502 X-RAY EXAM HIP UNI 2-3 VIEWS: CPT

## 2025-02-04 PROCEDURE — 99214 OFFICE O/P EST MOD 30 MIN: CPT | Performed by: STUDENT IN AN ORGANIZED HEALTH CARE EDUCATION/TRAINING PROGRAM

## 2025-02-04 NOTE — TELEPHONE ENCOUNTER
If she has been consistently losing weight except for the past 1-2 weeks I would suggest continued treatment with Zepbound 5mg through lam program-- would not consider this a plateau after only 1-2 weeks.   Continue to focus on lifestyle modification to help with continued weight loss.     In the future if she would like to increase we would need to send RX to pharmacy and cash pay cost would be higher. As of now 5mg is the highest dose available through the lam direct program.   *reminder she will need to stay off medication x 1 week prior to hip surgery

## 2025-02-04 NOTE — PROGRESS NOTES
Hip New Office Note    Assessment:     1. Primary osteoarthritis of right hip    2. Left hip pain        Plan:     Problem List Items Addressed This Visit    None  Visit Diagnoses         Primary osteoarthritis of right hip    -  Primary      Left hip pain        Relevant Orders    XR hip/pelv 2-3 vws left if performed           Findings today are consistent with right hip osteoarthritis and left hip pain. Imaging and prognosis was reviewed with the patient today. Discussed treatment options including continued observation, low impact exercises, anti-inflammatories, physical therapy, intra-articular cortisone injection, versus surgical intervention. She was encouraged to continue calcium and vitamin D for her osteopenia. Continue use of celebrex to control pain.    The patient has elected to proceed with right KENDRICK through the anterior approach. Risks and benefits of surgery to include but not limited to bleeding, infection, damage to surrounding structures, hardware failure, instability, fracture, dislocation, leg length inequality, need for further surgery, continued pain, stiffness, blood clots, stroke, heart attack, and LFCN numbness was discussed with the patient. Informed consent was signed today in the office. The patient has met with our surgical schedulers and our preoperative joint replacement pathway has been initiated. All questions were answered. Patient will follow-up 2 weeks post operatively.  Patient is a nonsmoker and acceptable BMI of 30.73. She will halt the Zepbound 1 week prior to surgery, discussed that her last dose will be 02/23/2025. Follow up after surgery.    Subjective:     Patient ID: Kaya Connor is a 69 y.o. female.  Chief Complaint:  HPI:  69 y.o. female presents to the office for evaluation of left hip pain. She has known right hip osteoarthritis for which she is scheduled for an anterior total hip arthroplasty in March 2025. She would also like to review the results of her DXA scan  as she notes that she has had some worsening since her previous scan 2 years ago. She has been experiencing lateral left hip pain which was previously relieved by greater trochanteric bursa cortisone injection several years ago. She continues to experience lateral right hip pain that goes into her anterior thigh to the knee. Her hip pain worsens with activities. She notes that she cannot walk as long as she used to due to the dull/aching pain in her hip and some fatigue. She has tried physical therapy and trochanteric bursa cortisone injections with minimal relief. She has also been experiencing bilateral knee pain, right slightly worse than left. She is having medial and lateral knee pain that goes down her calf and shin. Her pain has been keeping her awake throughout the night. She takes celebrex to control her pain.    Allergy:  Allergies   Allergen Reactions    Statins Myalgia     Medications:  all current active meds have been reviewed  Past Medical History:  Past Medical History:   Diagnosis Date    Achilles tendinitis of right lower extremity     last assessed - 01Jun2016    Allergic Seasonal    Arthritis Back and hips    Generalized anxiety disorder 06/06/2019    Hyperlipidemia     last assessed - [id number]    Sciatica, left side     last assessed - [id number]     Past Surgical History:  Past Surgical History:   Procedure Laterality Date    COLONOSCOPY      Complete colonoscopy - nml; resolved - [id number]     Family History:  Family History   Problem Relation Age of Onset    Diabetes Mother         After age 58    Hypertension Mother     Diabetes type II Mother         Type 2 diabetes mellitus    Hypertension Father     Heart disease Father         Myocardial ischemia    Abdominal aortic aneurysm Father     Heart defect Sister     Stroke Sister     Heart Valve Disease Sister         patent foramen ovale found     Clotting disorder Sister     Mental illness Sister     Hyperlipidemia Sister     Cancer Brother          Lung CA Smoker    Diabetes type II Brother         Type 2 diabetes mellitus    Hypertension Brother     Hyperlipidemia Brother     Heart disease Brother     Diabetes Brother     Arthritis Sister         Had a RNR and has rt shoulder arthritis.    Arthritis Sister         Had a RNR    Thyroid disease Neg Hx      Social History:  Social History     Substance and Sexual Activity   Alcohol Use No     Social History     Substance and Sexual Activity   Drug Use No     Social History     Tobacco Use   Smoking Status Never   Smokeless Tobacco Never         ROS:  General: Per HPI  Skin: Negative, except if noted below  HEENT: Negative  Respiratory: Negative  Cardiovascular: Negative  Gastrointestinal: Negative  Urinary: Negative  Vascular: Negative  Musculoskeletal: Positive per HPI   Neurologic: Positive per HPI  Endocrine: Negative    Objective:  BP Readings from Last 1 Encounters:   01/14/25 124/82      Wt Readings from Last 1 Encounters:   02/04/25 76.2 kg (168 lb)        Respiratory:   non-labored respirations    Lymphatics:  no palpable lymph nodes    Gait and Station:   Antalgic     Neurologic:   Alert and oriented times 3  Patient with normal sensation except as noted below  Deep tendon reflexes 2+ except as noted in MSK exam    Bilateral Lower Extremity:  Left Hip     Inspection: skin intact    Range of Motion: full without pain    - Trendelenburg sign    Motor: 5/5 Q/HS/TA/GS/P    Pulses: 2+ DP / 2+ PT    SILT DP/SP/S/S/TN     Right Hip     Inspection: skin intact    Range of Motion: limited with severe pain notably with IR    - Trendelenburg sign    Motor: 5/5 Q/HS/TA/GS/P    Pulses: 2+ DP / 2+ PT    SILT DP/SP/S/S/TN    Imaging:  My interpretation XR AP pelvis/ left hip: minimal joint space narrowing, subchondral sclerosis, subchondral cysts, osteophyte formation. No fracture or dislocation.     BMI:   Estimated body mass index is 30.73 kg/m² as calculated from the following:    Height as of this  "encounter: 5' 2\" (1.575 m).    Weight as of this encounter: 76.2 kg (168 lb).  BSA:   Estimated body surface area is 1.77 meters squared as calculated from the following:    Height as of this encounter: 5' 2\" (1.575 m).    Weight as of this encounter: 76.2 kg (168 lb).           Scribe Attestation      I,:  Patito Laguerre am acting as a scribe while in the presence of the attending physician.:       I,:  Oleg Sotelo, DO personally performed the services described in this documentation    as scribed in my presence.:            "

## 2025-02-04 NOTE — TELEPHONE ENCOUNTER
Pt calling re Zepbound. Tolerating 5mg, been on since October. Feels she is overall doing really well but feels for the last week or two she has been steady at 165-166lbs, monitoring on work scale. Wondering if refill should go up in dose or but also wondering if increased dose would need to be processed at different pharmacy/be more expensive (currently gets 5mg through [facility]).     Took last injection on Monday, wants to know how to proceed to order med for next dose.     Adds that she has not been exercising with hip, having hip sx in March 6 and will not be able to make 3/12, f/u, rescheduled to April.

## 2025-02-07 ENCOUNTER — OFFICE VISIT (OUTPATIENT)
Dept: CARDIOLOGY CLINIC | Facility: CLINIC | Age: 70
End: 2025-02-07
Payer: MEDICARE

## 2025-02-07 VITALS
SYSTOLIC BLOOD PRESSURE: 110 MMHG | DIASTOLIC BLOOD PRESSURE: 64 MMHG | WEIGHT: 162.2 LBS | HEIGHT: 62 IN | BODY MASS INDEX: 29.85 KG/M2 | HEART RATE: 107 BPM

## 2025-02-07 DIAGNOSIS — E78.5 DYSLIPIDEMIA: ICD-10-CM

## 2025-02-07 DIAGNOSIS — E78.2 MIXED HYPERLIPIDEMIA: Primary | ICD-10-CM

## 2025-02-07 PROCEDURE — 93000 ELECTROCARDIOGRAM COMPLETE: CPT | Performed by: INTERNAL MEDICINE

## 2025-02-07 PROCEDURE — 99214 OFFICE O/P EST MOD 30 MIN: CPT | Performed by: INTERNAL MEDICINE

## 2025-02-07 RX ORDER — CYANOCOBALAMIN (VITAMIN B-12) 1000 MCG
TABLET, SUBLINGUAL SUBLINGUAL
COMMUNITY

## 2025-02-07 NOTE — PROGRESS NOTES
"      Cardiology             Kaya Connor  1955  [id number]              Assessment/Plan:    Dyslipidemia  Elevated ASCVD risk, moderate        LDL cholesterol improved now 140 mg/dL on ezetimibe.  Continue the same  Suspect low cardiac risk for upcoming orthopedic surgery with no need for further cardiac testing at this time.  Mild sinus tachycardia on ECG, otherwise unremarkable.  Normal cardiac examination.  Patient without symptoms of angina.  No signs or symptoms of CHF.        Follow-up with PCP        Interval History:     This is a 69-year-old female with dyslipidemia who has tried simvastatin in the past, around 2019 with resultant myalgias.  She admits to history of fibromyalgia and has been on gabapentin for neuropathic pain.    During her initial visit 7/16/2024 she was instructed to take atorvastatin every other day which caused significant bloating.  She discontinued this on her own.    Calcium score 7/24/2024 was 1.    She presents today for follow-up with no new complaints.  She denies CP, SOB, dizziness, palpitations.                 Social History:    Non-smoker          Vitals:  Vitals:    02/07/25 1338   BP: 110/64   Pulse: (!) 107   Weight: 73.6 kg (162 lb 3.2 oz)   Height: 5' 2\" (1.575 m)         Past Medical History:   Diagnosis Date   • Achilles tendinitis of right lower extremity     last assessed - 01Jun2016   • Allergic Seasonal   • Arthritis Back and hips   • Generalized anxiety disorder 06/06/2019   • Hyperlipidemia     last assessed - 29Aug2016   • Sciatica, left side     last assessed - 22Apr2014     Social History     Socioeconomic History   • Marital status: Single     Spouse name: Not on file   • Number of children: Not on file   • Years of education: Not on file   • Highest education level: Not on file   Occupational History   • Not on file   Tobacco Use   • Smoking status: Never   • Smokeless tobacco: Never   Vaping Use   • Vaping status: Never Used   Substance and Sexual " Activity   • Alcohol use: No   • Drug use: No   • Sexual activity: Yes     Partners: Male     Birth control/protection: None     Comment: Not needed and  partner had tests done   Other Topics Concern   • Not on file   Social History Narrative   • Not on file     Social Drivers of Health     Financial Resource Strain: Low Risk  (12/12/2023)    Overall Financial Resource Strain (CARDIA)    • Difficulty of Paying Living Expenses: Not hard at all   Food Insecurity: No Food Insecurity (1/9/2025)    Hunger Vital Sign    • Worried About Running Out of Food in the Last Year: Never true    • Ran Out of Food in the Last Year: Never true   Transportation Needs: No Transportation Needs (1/9/2025)    PRAPARE - Transportation    • Lack of Transportation (Medical): No    • Lack of Transportation (Non-Medical): No   Physical Activity: Not on file   Stress: Not on file   Social Connections: Not on file   Intimate Partner Violence: Not on file   Housing Stability: Low Risk  (1/9/2025)    Housing Stability Vital Sign    • Unable to Pay for Housing in the Last Year: No    • Number of Times Moved in the Last Year: 0    • Homeless in the Last Year: No      Family History   Problem Relation Age of Onset   • Diabetes Mother         After age 58   • Hypertension Mother    • Diabetes type II Mother         Type 2 diabetes mellitus   • Hypertension Father    • Heart disease Father         Myocardial ischemia   • Abdominal aortic aneurysm Father    • Heart defect Sister    • Stroke Sister    • Heart Valve Disease Sister         patent foramen ovale found    • Clotting disorder Sister    • Mental illness Sister    • Hyperlipidemia Sister    • Cancer Brother         Lung CA Smoker   • Diabetes type II Brother         Type 2 diabetes mellitus   • Hypertension Brother    • Hyperlipidemia Brother    • Heart disease Brother    • Diabetes Brother    • Arthritis Sister         Had a RNR and has rt shoulder arthritis.   • Arthritis Sister         Had a  RNR   • Thyroid disease Neg Hx      Past Surgical History:   Procedure Laterality Date   • COLONOSCOPY      Complete colonoscopy - nml; resolved - [id number]       Current Outpatient Medications:   •  ascorbic acid (VITAMIN C) 500 MG tablet, Take 1 tablet (500 mg total) by mouth 2 (two) times a day, Disp: 30 tablet, Rfl: 1  •  celecoxib (CeleBREX) 100 mg capsule, Take 2 capsules (200 mg total) by mouth 2 (two) times a day, Disp: 120 capsule, Rfl: 1  •  cholecalciferol (VITAMIN D3) 25 mcg (1,000 units) tablet, Take 4,000 Units by mouth daily, Disp: , Rfl:   •  Cyanocobalamin (Vitamin B-12) 500 MCG SUBL, Take by mouth, Disp: , Rfl:   •  ezetimibe (ZETIA) 10 mg tablet, Take 1 tablet (10 mg total) by mouth daily, Disp: 90 tablet, Rfl: 3  •  folic acid (FOLVITE) 1 mg tablet, Take 1 tablet (1 mg total) by mouth daily, Disp: 30 tablet, Rfl: 1  •  gabapentin (NEURONTIN) 100 mg capsule, 2 tablets daily at bedtime, Disp: 180 capsule, Rfl: 1  •  Magnesium 250 MG TABS, Take by mouth, Disp: , Rfl:   •  Multiple Vitamins-Minerals (multivitamin with minerals) tablet, Take 1 tablet by mouth daily, Disp: 30 tablet, Rfl: 1  •  mupirocin (BACTROBAN) 2 % ointment, Apply topically to the inside of the left and right nostrils twice daily for 5 days before surgery, including the morning of surgery., Disp: 15 g, Rfl: 1  •  Tirzepatide-Weight Management (Zepbound) 5 MG/0.5ML SOLN, Inject 5 mg under the skin once a week, Disp: 2 mL, Rfl: 3  •  acetaminophen (TYLENOL) 650 mg CR tablet, Take 1,300 mg by mouth as needed for mild pain, Disp: , Rfl:         Review of Systems:  Review of Systems   Constitutional:  Negative for activity change, fever and unexpected weight change.   HENT:  Negative for facial swelling, nosebleeds and voice change.    Respiratory:  Negative for chest tightness, shortness of breath and wheezing.    Cardiovascular:  Negative for chest pain, palpitations and leg swelling.   Gastrointestinal:  Negative for abdominal  distention.   Genitourinary:  Negative for hematuria.   Musculoskeletal:  Negative for arthralgias.   Skin:  Negative for color change, pallor, rash and wound.   Neurological:  Negative for dizziness, seizures and syncope.   Psychiatric/Behavioral:  Negative for agitation.          Physical Exam:  Physical Exam  Vitals reviewed.   Constitutional:       Appearance: She is well-developed.   HENT:      Head: Normocephalic and atraumatic.   Cardiovascular:      Rate and Rhythm: Normal rate and regular rhythm.      Heart sounds: Normal heart sounds.   Pulmonary:      Effort: Pulmonary effort is normal.      Breath sounds: Normal breath sounds.   Abdominal:      Palpations: Abdomen is soft.   Musculoskeletal:         General: Normal range of motion.      Cervical back: Normal range of motion and neck supple.   Skin:     General: Skin is warm and dry.   Neurological:      Mental Status: She is alert and oriented to person, place, and time.   Psychiatric:         Behavior: Behavior normal.         Thought Content: Thought content normal.         Judgment: Judgment normal.         This note was completed in part utilizing M-Modal [employer] Direct Software.  Grammatical errors, random word insertions, spelling mistakes, and incomplete sentences can be an occasional consequence of this system secondary to software limitations, ambient noise, and hardware issues.  If you have any questions or concerns about the content, text, or information contained within the body of this dictation, please contact the provider for clarification.

## 2025-02-11 ENCOUNTER — OFFICE VISIT (OUTPATIENT)
Dept: FAMILY MEDICINE CLINIC | Facility: CLINIC | Age: 70
End: 2025-02-11
Payer: MEDICARE

## 2025-02-11 VITALS
WEIGHT: 164.4 LBS | TEMPERATURE: 98.2 F | OXYGEN SATURATION: 99 % | HEIGHT: 62 IN | HEART RATE: 82 BPM | SYSTOLIC BLOOD PRESSURE: 112 MMHG | BODY MASS INDEX: 30.25 KG/M2 | DIASTOLIC BLOOD PRESSURE: 76 MMHG

## 2025-02-11 DIAGNOSIS — R09.81 SINUS CONGESTION: Primary | ICD-10-CM

## 2025-02-11 LAB
SARS-COV-2 AG UPPER RESP QL IA: NEGATIVE
SL AMB POCT RAPID FLU A: NORMAL
SL AMB POCT RAPID FLU B: NORMAL
VALID CONTROL: NORMAL

## 2025-02-11 PROCEDURE — 87804 INFLUENZA ASSAY W/OPTIC: CPT | Performed by: FAMILY MEDICINE

## 2025-02-11 PROCEDURE — 87811 SARS-COV-2 COVID19 W/OPTIC: CPT | Performed by: FAMILY MEDICINE

## 2025-02-11 PROCEDURE — 99213 OFFICE O/P EST LOW 20 MIN: CPT | Performed by: FAMILY MEDICINE

## 2025-02-11 PROCEDURE — G2211 COMPLEX E/M VISIT ADD ON: HCPCS | Performed by: FAMILY MEDICINE

## 2025-02-11 RX ORDER — AZITHROMYCIN 250 MG/1
TABLET, FILM COATED ORAL
Qty: 6 TABLET | Refills: 0 | Status: SHIPPED | OUTPATIENT
Start: 2025-02-11 | End: 2025-02-15

## 2025-02-11 NOTE — PROGRESS NOTES
Name: Kaya Connor      : 1955      MRN: [id number]  Encounter Provider: Fernando Strickland MD  Encounter Date: 2025   Encounter department: [facility] PRIMARY CARE  :  Assessment & Plan  Sinus congestion  Rapid COVID and flu negative.  Start azithromycin.  May continue Tylenol.  Increase p.o. hydration.  RTC as needed  Orders:  •  POCT rapid flu A and B  •  POCT Rapid Covid Ag  •  azithromycin (ZITHROMAX) 250 mg tablet; Take 2 tablets today then 1 tablet daily x 4 days           History of Present Illness   Presents today with a weeklong history of URI symptoms    Sinus Problem  Associated symptoms include coughing and sinus pressure. Pertinent negatives include no chills, congestion, headaches, shortness of breath, sneezing or sore throat.   Cough  Pertinent negatives include no chest pain, chills, eye redness, fever, headaches, myalgias, rash, rhinorrhea, sore throat, shortness of breath or wheezing.     Review of Systems   Constitutional:  Negative for activity change, appetite change, chills, fatigue and fever.   HENT:  Positive for sinus pressure and sinus pain. Negative for congestion, rhinorrhea, sneezing and sore throat.    Eyes:  Negative for pain, discharge, redness and itching.   Respiratory:  Positive for cough. Negative for chest tightness, shortness of breath and wheezing.    Cardiovascular:  Negative for chest pain and palpitations.   Gastrointestinal:  Negative for abdominal distention, abdominal pain, constipation, diarrhea, nausea and vomiting.   Musculoskeletal:  Negative for arthralgias, back pain, joint swelling and myalgias.   Skin:  Negative for rash.   Neurological:  Negative for dizziness, weakness, numbness and headaches.   Hematological:  Negative for adenopathy.   Psychiatric/Behavioral:  Negative for confusion.    All other systems reviewed and are negative.      Objective   /76 (BP Location: Left arm, Patient Position: Sitting, Cuff Size: Adult)   Pulse  "82   Temp 98.2 °F (36.8 °C)   Ht 5' 2\" (1.575 m)   Wt 74.6 kg (164 lb 6.4 oz)   SpO2 99%   BMI 30.07 kg/m²      Physical Exam  Vitals reviewed.   Constitutional:       General: She is not in acute distress.     Appearance: Normal appearance. She is well-developed. She is not ill-appearing or toxic-appearing.   HENT:      Head: Normocephalic and atraumatic.      Right Ear: External ear normal.      Left Ear: External ear normal.   Eyes:      General: No scleral icterus.        Right eye: No discharge.         Left eye: No discharge.      Extraocular Movements: Extraocular movements intact.      Conjunctiva/sclera: Conjunctivae normal.      Pupils: Pupils are equal, round, and reactive to light.   Cardiovascular:      Rate and Rhythm: Normal rate and regular rhythm.      Pulses: Normal pulses.      Heart sounds: Normal heart sounds. No murmur heard.  Pulmonary:      Effort: Pulmonary effort is normal. No respiratory distress.      Breath sounds: Normal breath sounds.   Abdominal:      General: Abdomen is flat. There is no distension.      Palpations: Abdomen is soft. There is no mass.      Tenderness: There is no abdominal tenderness.      Hernia: No hernia is present.   Musculoskeletal:         General: No swelling, tenderness, deformity or signs of injury. Normal range of motion.      Cervical back: Normal range of motion.   Skin:     General: Skin is warm and dry.      Findings: No bruising, erythema, lesion or rash.   Neurological:      General: No focal deficit present.      Mental Status: She is alert.      Motor: No weakness.      Gait: Gait normal.   Psychiatric:         Mood and Affect: Mood normal.         Behavior: Behavior normal.         "

## 2025-02-12 ENCOUNTER — APPOINTMENT (OUTPATIENT)
Dept: LAB | Facility: MEDICAL CENTER | Age: 70
End: 2025-02-12
Payer: MEDICARE

## 2025-02-12 ENCOUNTER — EVALUATION (OUTPATIENT)
Dept: PHYSICAL THERAPY | Facility: MEDICAL CENTER | Age: 70
End: 2025-02-12
Payer: MEDICARE

## 2025-02-12 DIAGNOSIS — M16.11 PRIMARY OSTEOARTHRITIS OF RIGHT HIP: Primary | ICD-10-CM

## 2025-02-12 DIAGNOSIS — Z01.818 PRE-OP EVALUATION: ICD-10-CM

## 2025-02-12 LAB
APTT PPP: 28 SECONDS (ref 23–34)
BASOPHILS # BLD AUTO: 0.03 THOUSANDS/ΜL (ref 0–0.1)
BASOPHILS NFR BLD AUTO: 1 % (ref 0–1)
EOSINOPHIL # BLD AUTO: 0.13 THOUSAND/ΜL (ref 0–0.61)
EOSINOPHIL NFR BLD AUTO: 2 % (ref 0–6)
ERYTHROCYTE [DISTWIDTH] IN BLOOD BY AUTOMATED COUNT: 12.7 % (ref 11.6–15.1)
HCT VFR BLD AUTO: 39.6 % (ref 34.8–46.1)
HGB BLD-MCNC: 13.2 G/DL (ref 11.5–15.4)
IMM GRANULOCYTES # BLD AUTO: 0.02 THOUSAND/UL (ref 0–0.2)
IMM GRANULOCYTES NFR BLD AUTO: 0 % (ref 0–2)
INR PPP: 0.91 (ref 0.85–1.19)
LYMPHOCYTES # BLD AUTO: 2.23 THOUSANDS/ΜL (ref 0.6–4.47)
LYMPHOCYTES NFR BLD AUTO: 40 % (ref 14–44)
MCH RBC QN AUTO: 32.8 PG (ref 26.8–34.3)
MCHC RBC AUTO-ENTMCNC: 33.3 G/DL (ref 31.4–37.4)
MCV RBC AUTO: 98 FL (ref 82–98)
MONOCYTES # BLD AUTO: 0.38 THOUSAND/ΜL (ref 0.17–1.22)
MONOCYTES NFR BLD AUTO: 7 % (ref 4–12)
NEUTROPHILS # BLD AUTO: 2.8 THOUSANDS/ΜL (ref 1.85–7.62)
NEUTS SEG NFR BLD AUTO: 50 % (ref 43–75)
NRBC BLD AUTO-RTO: 0 /100 WBCS
PLATELET # BLD AUTO: 254 THOUSANDS/UL (ref 149–390)
PMV BLD AUTO: 11.4 FL (ref 8.9–12.7)
PROTHROMBIN TIME: 12.5 SECONDS (ref 12.3–15)
RBC # BLD AUTO: 4.03 MILLION/UL (ref 3.81–5.12)
WBC # BLD AUTO: 5.59 THOUSAND/UL (ref 4.31–10.16)

## 2025-02-12 PROCEDURE — 97110 THERAPEUTIC EXERCISES: CPT | Performed by: PHYSICAL THERAPIST

## 2025-02-12 PROCEDURE — 85730 THROMBOPLASTIN TIME PARTIAL: CPT

## 2025-02-12 PROCEDURE — 97161 PT EVAL LOW COMPLEX 20 MIN: CPT | Performed by: PHYSICAL THERAPIST

## 2025-02-12 PROCEDURE — 85025 COMPLETE CBC W/AUTO DIFF WBC: CPT

## 2025-02-12 PROCEDURE — 85610 PROTHROMBIN TIME: CPT

## 2025-02-12 RX ORDER — CETIRIZINE HYDROCHLORIDE 10 MG/1
10 TABLET ORAL AS NEEDED
COMMUNITY

## 2025-02-12 NOTE — PROGRESS NOTES
PT Evaluation     Today's date: 24  Patient name: Kaya Connor  : 1955  MRN: [id number]  Referring provider: Surekha Curtis PA-C  Dx:   Encounter Diagnoses   Name Primary?    Primary osteoarthritis of right hip Yes    Pre-op evaluation                   Assessment  Impairments: abnormal coordination, abnormal gait, abnormal or restricted ROM, activity intolerance, impaired balance, impaired physical strength, lacks appropriate home exercise program, pain with function, weight-bearing intolerance and poor body mechanics    Assessment details: Kaya Connor is a 69 y.o. female presents pre-operatively for R KENDRICK to be performed by Dr. Soteol on 3/6/25.  Kaya Connor has the above listed impairments and will benefit from skilled PT post-operatively to improve deficits to return to prior level of function.        Prognosis details: Prognosis given patient compliance to HEP and POC.     Goals  Goals to be established post-operatively.    Plan  Patient would benefit from: PT eval  Planned modality interventions: thermotherapy: hydrocollator packs    Planned therapy interventions: joint mobilization, manual therapy, neuromuscular re-education, strengthening, stretching, therapeutic activities, therapeutic exercise, graded exercise, graded activity, flexibility, home exercise program, body mechanics training, balance/weight bearing training and abdominal trunk stabilization    Frequency: 2x week  Therapy duration (weeks): 8-12 weeks.  Treatment plan discussed with: patient  Plan details: POC to begin at first post-op visit.  Subjective Evaluation    History of Present Illness  Mechanism of injury: Patient presents pre-operatively for R KENDRICK scheduled to be performed by  Dr. Sotelo on 3/6/25.  Patient has a hx of chronic hip and back pain.  She notes that celebrex has provided some relief.  She enjoys staying active, traveling, and walking.  Patient is looking forward to being able to do these activities post  surgery.  Patient Goals  Patient goals for therapy: decreased pain, improved balance, increased motion, increased strength, independence with ADLs/IADLs and return to sport/leisure activities    Pain  Current pain ratin  At best pain ratin  At worst pain ratin  Quality: ache.  Relieving factors: heat and rest (Celebrex)  Aggravating factors: walking (bending)      Diagnostic Tests  Abnormal x-ray: Right hip OA.  Treatments  Current treatment: physical therapy  Objective     Lumbar Screen  Lumbar range of motion within normal limits with the following exceptions:Limited active lumbar ROM throughout.    Neurological Testing     Sensation     Hip   Left Hip   Intact: light touch    Right Hip   Intact: light touch    Additional Neurological Details  Reflexes NT.     Passive Range of Motion     Additional Passive Range of Motion Details  Limited R hip PROM throughout c/ pain.    Strength/Myotome Testing     Left Hip   Planes of Motion   Flexion: 5    Right Hip   Planes of Motion   Flexion: 3-    Tests     Additional Tests Details  No pertinent testing.    Ambulation     Ambulation: Level Surfaces   Ambulation without assistive device: independent    Observational Gait   Gait: antalgic   Decreased walking speed and right stance time.     Functional Assessment      Squat    Pain and sitting toward left side.     Single Leg Stance   Left single leg stance time: WNL.  Right single leg stance time: unsteady.    Posterior weight shift: moderate       Precautions: N/A    Manuals                                                                 Neuro Re-Ed                                                                                                        Ther Ex             HEP demo & review 15'                                                                                                       Ther Activity                                       Gait Training                                       Modalities                                                Liz Noland, PT  2/13/2025,2:31 PM

## 2025-02-12 NOTE — PRE-PROCEDURE INSTRUCTIONS
Pre-Surgery Instructions:   Medication Instructions    acetaminophen (TYLENOL) 650 mg CR tablet Uses PRN- OK to take day of surgery- if needed with sip of water     ascorbic acid (VITAMIN C) 500 MG tablet Surgeon prescribed - instructed to use up to and including 3/5/25    azithromycin (ZITHROMAX) 250 mg tablet Currently taking for sinus issues - will be done with ATB by DOS     celecoxib (CeleBREX) 100 mg capsule Stop taking 3 days prior to surgery. Did instruct pt that if at  appt instructed to hold longer -follow MD instructions    cetirizine (ZyrTEC) 10 mg tablet Uses PRN- OK to take day of surgery-if needed with sip of water     cholecalciferol (VITAMIN D3) 25 mcg (1,000 units) tablet Stop taking 7 days prior to surgery.    ezetimibe (ZETIA) 10 mg tablet Take day of surgery. With sip of water    folic acid (FOLVITE) 1 mg tablet Surgeon prescribed - instructed to use up to and including 3/5/25    gabapentin (NEURONTIN) 100 mg capsule Take night before surgery    Loratadine-Pseudoephedrine (CLARITIN-D 12 HOUR PO) Hold day of surgery.    Magnesium 250 MG TABS Stop taking 7 days prior to surgery.    Multiple Vitamins-Minerals (multivitamin with minerals) tablet Surgeon prescribed - instructed to use up to and including 3/5/25    mupirocin (BACTROBAN) 2 % ointment Instructions provided by MD- aware to use 5 days prior to surgery BID and then am DOS     Tirzepatide-Weight Management (Zepbound) 5 MG/0.5ML SOLN Stop taking 7 days prior to surgery. - last dose to be 2/26/25 prior to surgery     Pt instructed on use of cleanser for DOS and instructions for showering both night before and DOS reviewed with pt - pt verbalized understanding of instructions given  Pt also instructed on no hair or body products on skin for DOS.  No shaving with a razor blade for 7 days prior to surgery to decrease any incident of infection - electric razor is ok to use up till 24 hrs prior to DOS.  Pt instructed that if with any changes in  health status between now and DOS - notify surgeon office.  Tylenol is ok to use as needed up to and including DOS with sips of water - if needed - did instruct NO NSAIDS to be used at least 3 days prior to surgery - or if given a longer hold time of NSAIDS from MD please follow MD hold instructions.  Instructed to bring photo ID and medical insurance card for DOS as forms of identification for DOS.  Also instructed pt on no jewelry or body piercings, no valuables on body for DOS. Contact lenses, if worn - need to be removed for DOS.  Pt instructed does need transport home after surgery- pt is not allowed to drive.    Pt aware of  appt 2/19   Pt instructed if with any questions moving forward after today's PAT call - notify ONN Liz Anguiano with any questions/concerns - phone# provided - [phone].  Aware to bring RW DOS     Medication instructions for day surgery reviewed. Please use only a sip of water to take your instructed medications. Avoid all over the counter vitamins, supplements and NSAIDS for one week prior to surgery per anesthesia guidelines. Tylenol is ok to take as needed.     You will receive a call one business day prior to surgery with an arrival time and hospital directions. If your surgery is scheduled on a Monday, the hospital will be calling you on the Friday prior to your surgery. If you have not heard from anyone by 8pm, please call the hospital supervisor through the hospital  at [phone]. ([place] [phone] or [place] [phone]).    Do not eat or drink anything after midnight the night before your surgery, including candy, mints, lifesavers, or chewing gum. Do not drink alcohol 24hrs before your surgery. Try not to smoke at least 24hrs before your surgery.       Follow the pre surgery showering instructions as listed in the “My Surgical Experience Booklet” or otherwise provided by your surgeon's office. Do not use a blade to shave the surgical area 1 week before  surgery. It is okay to use a clean electric clippers up to 24 hours before surgery. Do not apply any lotions, creams, including makeup, cologne, deodorant, or perfumes after showering on the day of your surgery. Do not use dry shampoo, hair spray, hair gel, or any type of hair products.     No contact lenses, eye make-up, or artificial eyelashes. Remove nail polish, including gel polish, and any artificial, gel, or acrylic nails if possible. Remove all jewelry including rings and body piercing jewelry.     Wear causal clothing that is easy to take on and off. Consider your type of surgery.    Keep any valuables, jewelry, piercings at home. Please bring any specially ordered equipment (sling, braces) if indicated.    Arrange for a responsible person to drive you to and from the hospital on the day of your surgery. Please confirm the visitor policy for the day of your procedure when you receive your phone call with an arrival time.     Call the surgeon's office with any new illnesses, exposures, or additional questions prior to surgery.    Please reference your “My Surgical Experience Booklet” for additional information to prepare for your upcoming surgery.

## 2025-02-13 PROBLEM — Z00.00 MEDICARE ANNUAL WELLNESS VISIT, SUBSEQUENT: Status: RESOLVED | Noted: 2022-12-06 | Resolved: 2025-02-13

## 2025-02-13 NOTE — PROGRESS NOTES
Internal Medicine Pre-Operative Evaluation:     Reason for Visit: Pre-operative Evaluation for Risk Stratification and Optimization    Patient ID: Kaya Connor is a 69 y.o. female.     Future cases for Ольга Connor [[id number]]       Case ID Status Date Time Reza Procedure Provider Location    [id number] [facility] 3/6/2025  4:00  ARTHROPLASTY HIP TOTAL ANTERIOR,NAVIGATED- SAME DAY Oleg Sotelo DO [[id number]] [facility]               Recommendations to Proceed withSurgery    Patient is considered to be Low risk for Medium risk procedure.     After evaluation and discussion with patient with emphasis that all surgery has some degree of inherent risk it is acknowledged by patient this risk is Acceptable.    Patient is optimized and may proceed with planned procedure.     Assessment    Pre-operative Medical Evaluation for planned surgery  Recommendations as listed in PLAN section below  Contact surgical nurse  navigator with any questions regarding preoperative plan or schedule.      Assessment & Plan  Primary osteoarthritis of left hip  Failed outpatient conservative measures  Elected to undergo surgical intervention    Mixed hyperlipidemia  Continue low cholesterol diet and statin therapy    Pre-op examination             Plan:     1. Further preoperative workup as follows:   - none no further testing required may proceed with surgery    2. Preoperative Medication Management Review performed by PAT nursing  YES    3. Patient requires further consultation with:   No Consults Required    4. Discharge Planning / Barriers to Discharge  none identified - patients has post discharge therapy plan in place, transportation arranged for discharge day, adequate family support at home to assist with discharge to home.        Subjective:           History of Present Illness:     Kaya Connor is a 69 y.o. female who presents to the office today for a preoperative consultation at the request of surgeon. The patient  understands this is an elective procedure and not emergent. They are electing to undergo planned procedure with an understanding that all surgery has inherent risk. They have worked with their surgeon and failed conservative treatment measures. Today they present for preoperative risk assessment and recommendations for optimization in preparation for surgery.    Pt seen in center for perioperative medicine for upcoming proposed surgery. They have failed previous conservative measures and have elected surgical intervention.     Pt meets presurgical lab and BMI optimization goals.      Kaya Connor has an IN HOSPITAL cardiac risk of RCI RISK CLASS I (0 risk factors, risk of major cardiac compl. appr. 0.5%) based on RCRI calculator    Cardiac Risk Estimation: per the Revised Cardiac Risk Index (Circ. 100:1043, 1999),         Pre-op Exam    Previous history of bleeding disorders or clots?: No  Previous Anesthesia reaction?: No  Prolonged steroid use in the last 6 months?: No    Assessment of Cardiac Risk:   - Unstable or severe angina or MI in the last 6 weeks or history of stent placement in the last year?: No   - Decompensated heart failure (e.g. New onset heart failure, NYHA  Class IV heart failure, or worsening existing heart failure)?: No  - Significant arrhythmias such as high grade AV block, symptomatic ventricular arrhythmia, newly recognized ventricular tachycardia, supraventricular tachycardia with resting heart rate >100, or symptomatic bradycardia?: No  - Severe heart valve disease including aortic stenosis or symptomatic mitral stenosis?: No      Pre-operative Risk Factors:  Elevated-risk surgery: No    History of cerebrovascular disease: No    History of ischemic heart disease: No  Pre-operative treatment with insulin: No  Pre-operative creatinine >2 mg/dL: No    History of congestive heart failure: No        ROS: No TIA's or unusual headaches, no dysphagia.  No prolonged cough. No dyspnea or chest pain  "on exertion.  No abdominal pain, change in bowel habits, black or bloody stools.  No urinary tract or BPH symptoms.  Positive reported pain in arthritic joint. Positive difficulty with gait. No skin rashes or issues.      Objective:    /80   Pulse 85   Ht 5' 2\" (1.575 m)   Wt 75 kg (165 lb 6.4 oz)   BMI 30.25 kg/m²       General Appearance: no distress, conversive  HEENT: PERRLA, conjuctiva normal; oropharynx clear; mucous membranes moist;   Neck:  Supple, no lymphadenopathy or thyromegaly  Lungs: breath sounds normal, normal respiratory effort, no retractions, expiratory effort normal  CV: normal heart sounds S1/S2, PMI normal   ABD: soft non tender, no masses , no hepatic or splenomegaly  EXT: DP pulses intact, no lymphadenopathy, no edema  Skin: normal turgor, normal texture, no rash  Psych: affect normal, mood normal  Neuro: AAOx3        The following portions of the patient's history were reviewed and updated as appropriate: allergies, current medications, past family history, past medical history, past social history, past surgical history and problem list.     Past History:       Past Medical History:   Diagnosis Date    Achilles tendinitis     Left ankle    Allergic Seasonal    Anesthesia     2/12/25 per pt - reports BP low with use of anesthesia    Arthritis Back and hips    Fibromyalgia, primary     hx of fibromyalgia    Generalized anxiety disorder 06/06/2019    Hip pain     RIGHT -  discomfort    Hyperlipidemia     last assessed - [id number]    Lumbar degenerative disc disease     Sciatica, left side     last assessed - [id number]    Past Surgical History:   Procedure Laterality Date    COLONOSCOPY      Complete colonoscopy - nml; resolved - [id number]    DENTAL SURGERY      tooth- titanium implant - used liquid versed per pt    LUMBAR EPIDURAL INJECTION      child delivery    OTHER SURGICAL HISTORY      nasal surgery to remove basal cell lesion          Social History     Tobacco Use    Smoking " status: Never    Smokeless tobacco: Never    Tobacco comments:     Never a smoker or use of any tobacco products per pt    Vaping Use    Vaping status: Never Used   Substance Use Topics    Alcohol use: Yes     Comment: rare - occasional glass of wine    Drug use: Not Currently     Comment: medical marijuana card-- not using gummies at this time     Family History   Problem Relation Age of Onset    Diabetes Mother         After age 58    Hypertension Mother     Diabetes type II Mother         Type 2 diabetes mellitus    Hypertension Father     Heart disease Father         Myocardial ischemia    Abdominal aortic aneurysm Father     Heart defect Sister     Stroke Sister     Heart Valve Disease Sister         patent foramen ovale found     Clotting disorder Sister     Mental illness Sister     Hyperlipidemia Sister     Anesthesia problems Sister     Arthritis Sister         Had a RNR and has rt shoulder arthritis.    No Known Problems Daughter     No Known Problems Maternal Grandmother     No Known Problems Maternal Grandfather     No Known Problems Paternal Grandmother     No Known Problems Paternal Grandfather     Cancer Brother         Lung CA Smoker    Diabetes type II Brother         Type 2 diabetes mellitus    Hypertension Brother     Hyperlipidemia Brother     Heart disease Brother     Diabetes Brother     No Known Problems Paternal Aunt     No Known Problems Paternal Aunt     No Known Problems Paternal Aunt     Thyroid disease Neg Hx           Allergies:     Allergies   Allergen Reactions    Statins Myalgia        Current Medications:     Current Outpatient Medications   Medication Instructions    acetaminophen (TYLENOL) 1,300 mg, As needed    ascorbic acid (VITAMIN C) 500 mg, Oral, 2 times daily    celecoxib (CELEBREX) 200 mg, Oral, 2 times daily    cetirizine (ZYRTEC) 10 mg, As needed    cholecalciferol (VITAMIN D3) 3,000 Units, Daily    Cyanocobalamin (Vitamin B-12) 500 MCG SUBL Take by mouth    ezetimibe  "(ZETIA) 10 mg, Oral, Daily    folic acid (FOLVITE) 1 mg, Oral, Daily    gabapentin (NEURONTIN) 100 mg capsule 2 tablets daily at bedtime    Loratadine-Pseudoephedrine (CLARITIN-D 12 HOUR PO) As needed    Magnesium 250 MG TABS Daily at bedtime    methylPREDNISolone 4 MG tablet therapy pack Use as directed on package    Multiple Vitamins-Minerals (multivitamin with minerals) tablet 1 tablet, Oral, Daily    mupirocin (BACTROBAN) 2 % ointment Apply topically to the inside of the left and right nostrils twice daily for 5 days before surgery, including the morning of surgery.    Zepbound 5 mg, Subcutaneous, Weekly           PRE-OP WORKSHEET DATA    Assessment of Pre-Operative Risks     MLJ Quality Hard Stops:    BMI (<40) : Estimated body mass index is 30.25 kg/m² as calculated from the following:    Height as of this encounter: 5' 2\" (1.575 m).    Weight as of this encounter: 75 kg (165 lb 6.4 oz).    Hgb ( >11):   Lab Results   Component Value Date    HGB 13.2 02/12/2025    HGB 14.6 09/26/2024    HGB 14.6 10/23/2020       HbA1c (<7.5) :   Lab Results   Component Value Date    HGBA1C 5.0 01/24/2025       GFR (>60) (Less then 45 = Nephrology consult):    Lab Results   Component Value Date    EGFR 75 01/24/2025    EGFR 62 05/21/2024    EGFR 66 12/12/2023            Pre-Op Data Reviewed:       Laboratory Results: I have personally reviewed the pertinent reports    EKG: I personally reviewed and interpreted available tracings in the electronic medical record    Sinus tachycardia     Specimen Collected: 02/07/25  1:47 PM Last Resulted: 02/07/25  1:47 PM           OLD RECORDS: reviewed old records in the chart review section if EHR on day of visit.    Previous cardiopulmonary studies within the past year:  Echocardiogram: no   Cardiac Catheterization: no  Stress Test: no      Time of visit including pre-visit chart review, visit and post-visit coordination of plan and care , review of pre-surgical lab work, preparation and " time spent documenting note in electronic medical record, time spent face-to-face in physical examination answering patient questions by care team 35 minutes             [facility]

## 2025-02-13 NOTE — H&P (VIEW-ONLY)
Internal Medicine Pre-Operative Evaluation:     Reason for Visit: Pre-operative Evaluation for Risk Stratification and Optimization    Patient ID: Kaya Connor is a 69 y.o. female.     Future cases for Ольга Connor [[id number]]       Case ID Status Date Time Reza Procedure Provider Location    [id number] [facility] 3/6/2025  4:00  ARTHROPLASTY HIP TOTAL ANTERIOR,NAVIGATED- SAME DAY Oleg Sotelo DO [[id number]] [facility]               Recommendations to Proceed withSurgery    Patient is considered to be Low risk for Medium risk procedure.     After evaluation and discussion with patient with emphasis that all surgery has some degree of inherent risk it is acknowledged by patient this risk is Acceptable.    Patient is optimized and may proceed with planned procedure.     Assessment    Pre-operative Medical Evaluation for planned surgery  Recommendations as listed in PLAN section below  Contact surgical nurse  navigator with any questions regarding preoperative plan or schedule.      Assessment & Plan  Primary osteoarthritis of left hip  Failed outpatient conservative measures  Elected to undergo surgical intervention    Mixed hyperlipidemia  Continue low cholesterol diet and statin therapy    Pre-op examination             Plan:     1. Further preoperative workup as follows:   - none no further testing required may proceed with surgery    2. Preoperative Medication Management Review performed by PAT nursing  YES    3. Patient requires further consultation with:   No Consults Required    4. Discharge Planning / Barriers to Discharge  none identified - patients has post discharge therapy plan in place, transportation arranged for discharge day, adequate family support at home to assist with discharge to home.        Subjective:           History of Present Illness:     Kaya Connor is a 69 y.o. female who presents to the office today for a preoperative consultation at the request of surgeon. The patient  understands this is an elective procedure and not emergent. They are electing to undergo planned procedure with an understanding that all surgery has inherent risk. They have worked with their surgeon and failed conservative treatment measures. Today they present for preoperative risk assessment and recommendations for optimization in preparation for surgery.    Pt seen in center for perioperative medicine for upcoming proposed surgery. They have failed previous conservative measures and have elected surgical intervention.     Pt meets presurgical lab and BMI optimization goals.      Kaya Connor has an IN HOSPITAL cardiac risk of RCI RISK CLASS I (0 risk factors, risk of major cardiac compl. appr. 0.5%) based on RCRI calculator    Cardiac Risk Estimation: per the Revised Cardiac Risk Index (Circ. 100:1043, 1999),         Pre-op Exam    Previous history of bleeding disorders or clots?: No  Previous Anesthesia reaction?: No  Prolonged steroid use in the last 6 months?: No    Assessment of Cardiac Risk:   - Unstable or severe angina or MI in the last 6 weeks or history of stent placement in the last year?: No   - Decompensated heart failure (e.g. New onset heart failure, NYHA  Class IV heart failure, or worsening existing heart failure)?: No  - Significant arrhythmias such as high grade AV block, symptomatic ventricular arrhythmia, newly recognized ventricular tachycardia, supraventricular tachycardia with resting heart rate >100, or symptomatic bradycardia?: No  - Severe heart valve disease including aortic stenosis or symptomatic mitral stenosis?: No      Pre-operative Risk Factors:  Elevated-risk surgery: No    History of cerebrovascular disease: No    History of ischemic heart disease: No  Pre-operative treatment with insulin: No  Pre-operative creatinine >2 mg/dL: No    History of congestive heart failure: No        ROS: No TIA's or unusual headaches, no dysphagia.  No prolonged cough. No dyspnea or chest pain  "on exertion.  No abdominal pain, change in bowel habits, black or bloody stools.  No urinary tract or BPH symptoms.  Positive reported pain in arthritic joint. Positive difficulty with gait. No skin rashes or issues.      Objective:    /80   Pulse 85   Ht 5' 2\" (1.575 m)   Wt 75 kg (165 lb 6.4 oz)   BMI 30.25 kg/m²       General Appearance: no distress, conversive  HEENT: PERRLA, conjuctiva normal; oropharynx clear; mucous membranes moist;   Neck:  Supple, no lymphadenopathy or thyromegaly  Lungs: breath sounds normal, normal respiratory effort, no retractions, expiratory effort normal  CV: normal heart sounds S1/S2, PMI normal   ABD: soft non tender, no masses , no hepatic or splenomegaly  EXT: DP pulses intact, no lymphadenopathy, no edema  Skin: normal turgor, normal texture, no rash  Psych: affect normal, mood normal  Neuro: AAOx3        The following portions of the patient's history were reviewed and updated as appropriate: allergies, current medications, past family history, past medical history, past social history, past surgical history and problem list.     Past History:       Past Medical History:   Diagnosis Date    Achilles tendinitis     Left ankle    Allergic Seasonal    Anesthesia     2/12/25 per pt - reports BP low with use of anesthesia    Arthritis Back and hips    Fibromyalgia, primary     hx of fibromyalgia    Generalized anxiety disorder 06/06/2019    Hip pain     RIGHT -  discomfort    Hyperlipidemia     last assessed - [id number]    Lumbar degenerative disc disease     Sciatica, left side     last assessed - [id number]    Past Surgical History:   Procedure Laterality Date    COLONOSCOPY      Complete colonoscopy - nml; resolved - [id number]    DENTAL SURGERY      tooth- titanium implant - used liquid versed per pt    LUMBAR EPIDURAL INJECTION      child delivery    OTHER SURGICAL HISTORY      nasal surgery to remove basal cell lesion          Social History     Tobacco Use    Smoking " status: Never    Smokeless tobacco: Never    Tobacco comments:     Never a smoker or use of any tobacco products per pt    Vaping Use    Vaping status: Never Used   Substance Use Topics    Alcohol use: Yes     Comment: rare - occasional glass of wine    Drug use: Not Currently     Comment: medical marijuana card-- not using gummies at this time     Family History   Problem Relation Age of Onset    Diabetes Mother         After age 58    Hypertension Mother     Diabetes type II Mother         Type 2 diabetes mellitus    Hypertension Father     Heart disease Father         Myocardial ischemia    Abdominal aortic aneurysm Father     Heart defect Sister     Stroke Sister     Heart Valve Disease Sister         patent foramen ovale found     Clotting disorder Sister     Mental illness Sister     Hyperlipidemia Sister     Anesthesia problems Sister     Arthritis Sister         Had a RNR and has rt shoulder arthritis.    No Known Problems Daughter     No Known Problems Maternal Grandmother     No Known Problems Maternal Grandfather     No Known Problems Paternal Grandmother     No Known Problems Paternal Grandfather     Cancer Brother         Lung CA Smoker    Diabetes type II Brother         Type 2 diabetes mellitus    Hypertension Brother     Hyperlipidemia Brother     Heart disease Brother     Diabetes Brother     No Known Problems Paternal Aunt     No Known Problems Paternal Aunt     No Known Problems Paternal Aunt     Thyroid disease Neg Hx           Allergies:     Allergies   Allergen Reactions    Statins Myalgia        Current Medications:     Current Outpatient Medications   Medication Instructions    acetaminophen (TYLENOL) 1,300 mg, As needed    ascorbic acid (VITAMIN C) 500 mg, Oral, 2 times daily    celecoxib (CELEBREX) 200 mg, Oral, 2 times daily    cetirizine (ZYRTEC) 10 mg, As needed    cholecalciferol (VITAMIN D3) 3,000 Units, Daily    Cyanocobalamin (Vitamin B-12) 500 MCG SUBL Take by mouth    ezetimibe  "(ZETIA) 10 mg, Oral, Daily    folic acid (FOLVITE) 1 mg, Oral, Daily    gabapentin (NEURONTIN) 100 mg capsule 2 tablets daily at bedtime    Loratadine-Pseudoephedrine (CLARITIN-D 12 HOUR PO) As needed    Magnesium 250 MG TABS Daily at bedtime    methylPREDNISolone 4 MG tablet therapy pack Use as directed on package    Multiple Vitamins-Minerals (multivitamin with minerals) tablet 1 tablet, Oral, Daily    mupirocin (BACTROBAN) 2 % ointment Apply topically to the inside of the left and right nostrils twice daily for 5 days before surgery, including the morning of surgery.    Zepbound 5 mg, Subcutaneous, Weekly           PRE-OP WORKSHEET DATA    Assessment of Pre-Operative Risks     MLJ Quality Hard Stops:    BMI (<40) : Estimated body mass index is 30.25 kg/m² as calculated from the following:    Height as of this encounter: 5' 2\" (1.575 m).    Weight as of this encounter: 75 kg (165 lb 6.4 oz).    Hgb ( >11):   Lab Results   Component Value Date    HGB 13.2 02/12/2025    HGB 14.6 09/26/2024    HGB 14.6 10/23/2020       HbA1c (<7.5) :   Lab Results   Component Value Date    HGBA1C 5.0 01/24/2025       GFR (>60) (Less then 45 = Nephrology consult):    Lab Results   Component Value Date    EGFR 75 01/24/2025    EGFR 62 05/21/2024    EGFR 66 12/12/2023            Pre-Op Data Reviewed:       Laboratory Results: I have personally reviewed the pertinent reports    EKG: I personally reviewed and interpreted available tracings in the electronic medical record    Sinus tachycardia     Specimen Collected: 02/07/25  1:47 PM Last Resulted: 02/07/25  1:47 PM           OLD RECORDS: reviewed old records in the chart review section if EHR on day of visit.    Previous cardiopulmonary studies within the past year:  Echocardiogram: no   Cardiac Catheterization: no  Stress Test: no      Time of visit including pre-visit chart review, visit and post-visit coordination of plan and care , review of pre-surgical lab work, preparation and " time spent documenting note in electronic medical record, time spent face-to-face in physical examination answering patient questions by care team 35 minutes             [facility]

## 2025-02-13 NOTE — PATIENT INSTRUCTIONS
BEFORE SURGERY    Contact your surgical nurse navigator or surgical provider with any questions regarding preoperative plan or schedule.  Stop all over the counter supplements, herbal, naturopathic  medications for 1 week prior to surgery UNLESS prescribed by your surgeon  Hold NSAIDS (i.e. advil, alleve, motrin, ibuprofen, celebrex) minimum 5 days prior to surgery  Follow presurgical medication instructions provided by preadmission nursing team reviewed during your presurgery phone call  Strategies for optimizing your surgery through breathing exercises, nutrition and physical activity can be found at [email or web address]  Call [phone] with any presurgical concerns or medications questions or use the messaging feature in your Segway roxanne to contact your provider    AFTER SURGERY    Recommend using Tylenol ( acetaminophen ) 1000 mg every eight hours during the first week post discharge along with icing the area for 20 mins every 3-4 hours while awake can be helpful in reducing your need for post operative opioid use. This opioid sparing plan can be used along side your surgeons pain plan.  Use stool softener over the counter (colace) daily after surgery during the first 1-2 weeks to avoid post operative constipation issues  If no bowel movement within 3 days after surgery then use over the counter Miralax in addition to your stool softener   If cleared by your surgical team for activity then early and often walking is encouraged and can be important in prevention of post surgical blood clots. Additionally spend as much time out of bed as possible and allowed by your surgical team  Use your incentive spirometer twice per hour in the first seven days after surgery to help prevent post surgery lung complications and infections  It is very important you follow the instructions from your surgeon regarding any medications for after surgery blood clot prevention. Compliance with these medications or interventions is very  important.  Call [phone] with any post discharge concerns or medical issues or use the messaging feature in your [employer] roxanne to contact your provider

## 2025-02-16 ENCOUNTER — PATIENT MESSAGE (OUTPATIENT)
Age: 70
End: 2025-02-16

## 2025-02-16 DIAGNOSIS — M16.11 PRIMARY OSTEOARTHRITIS OF RIGHT HIP: Primary | ICD-10-CM

## 2025-02-17 ENCOUNTER — HOSPITAL ENCOUNTER (OUTPATIENT)
Dept: MAMMOGRAPHY | Facility: MEDICAL CENTER | Age: 70
Discharge: HOME | End: 2025-02-17
Payer: MEDICARE

## 2025-02-17 VITALS — BODY MASS INDEX: 30.18 KG/M2 | WEIGHT: 164 LBS | HEIGHT: 62 IN

## 2025-02-17 DIAGNOSIS — Z12.31 ENCOUNTER FOR SCREENING MAMMOGRAM FOR MALIGNANT NEOPLASM OF BREAST: ICD-10-CM

## 2025-02-17 PROCEDURE — 77067 SCR MAMMO BI INCL CAD: CPT

## 2025-02-17 PROCEDURE — 77063 BREAST TOMOSYNTHESIS BI: CPT

## 2025-02-17 RX ORDER — METHYLPREDNISOLONE 4 MG/1
TABLET ORAL
Qty: 21 TABLET | Refills: 0 | Status: SHIPPED | OUTPATIENT
Start: 2025-02-17

## 2025-02-18 ENCOUNTER — TELEPHONE (OUTPATIENT)
Dept: ADMINISTRATIVE | Facility: OTHER | Age: 70
End: 2025-02-18

## 2025-02-18 DIAGNOSIS — G25.81 RESTLESS LEG: ICD-10-CM

## 2025-02-18 RX ORDER — GABAPENTIN 100 MG/1
CAPSULE ORAL
Qty: 180 CAPSULE | Refills: 1 | Status: SHIPPED | OUTPATIENT
Start: 2025-02-18

## 2025-02-18 NOTE — TELEPHONE ENCOUNTER
02/18/25 10:05 AM    Patient contacted to bring Advance Directive, POLST, or Living Will document to next scheduled pcp visit.[facility] Department spoke with patient/ caregiver.    Thank you.  Adore Kellogg MA  [facility]

## 2025-02-19 ENCOUNTER — OFFICE VISIT (OUTPATIENT)
Age: 70
End: 2025-02-19
Payer: MEDICARE

## 2025-02-19 VITALS
SYSTOLIC BLOOD PRESSURE: 128 MMHG | DIASTOLIC BLOOD PRESSURE: 80 MMHG | BODY MASS INDEX: 30.44 KG/M2 | HEART RATE: 85 BPM | WEIGHT: 165.4 LBS | HEIGHT: 62 IN

## 2025-02-19 DIAGNOSIS — E78.2 MIXED HYPERLIPIDEMIA: ICD-10-CM

## 2025-02-19 DIAGNOSIS — Z01.818 PRE-OP EXAMINATION: ICD-10-CM

## 2025-02-19 DIAGNOSIS — M16.11 PRIMARY OSTEOARTHRITIS OF RIGHT HIP: ICD-10-CM

## 2025-02-19 DIAGNOSIS — M25.561 RIGHT KNEE PAIN, UNSPECIFIED CHRONICITY: ICD-10-CM

## 2025-02-19 DIAGNOSIS — M16.12 PRIMARY OSTEOARTHRITIS OF LEFT HIP: Primary | ICD-10-CM

## 2025-02-19 PROCEDURE — 99205 OFFICE O/P NEW HI 60 MIN: CPT | Performed by: INTERNAL MEDICINE

## 2025-02-20 ENCOUNTER — ANESTHESIA EVENT (OUTPATIENT)
Age: 70
End: 2025-02-20
Payer: MEDICARE

## 2025-02-20 NOTE — TELEPHONE ENCOUNTER
----- Message from Yumiko Mack DO sent at 2/20/2025  8:47 AM EST -----  Call patient mammogram is normal repeat in one year

## 2025-03-03 ENCOUNTER — APPOINTMENT (OUTPATIENT)
Dept: LAB | Facility: HOSPITAL | Age: 70
End: 2025-03-03

## 2025-03-04 ENCOUNTER — TRANSCRIBE ORDERS (OUTPATIENT)
Dept: LAB | Facility: HOSPITAL | Age: 70
End: 2025-03-04

## 2025-03-04 ENCOUNTER — APPOINTMENT (OUTPATIENT)
Dept: LAB | Facility: MEDICAL CENTER | Age: 70
End: 2025-03-04
Payer: MEDICARE

## 2025-03-04 DIAGNOSIS — M25.561 RIGHT KNEE PAIN, UNSPECIFIED CHRONICITY: ICD-10-CM

## 2025-03-04 DIAGNOSIS — M25.561 RIGHT KNEE PAIN, UNSPECIFIED CHRONICITY: Primary | ICD-10-CM

## 2025-03-04 DIAGNOSIS — M16.11 PRIMARY OSTEOARTHRITIS OF RIGHT HIP: ICD-10-CM

## 2025-03-04 PROCEDURE — 87081 CULTURE SCREEN ONLY: CPT

## 2025-03-05 ENCOUNTER — OFFICE VISIT (OUTPATIENT)
Dept: BARIATRICS | Facility: CLINIC | Age: 70
End: 2025-03-05
Payer: MEDICARE

## 2025-03-05 VITALS
TEMPERATURE: 97.9 F | HEIGHT: 62 IN | DIASTOLIC BLOOD PRESSURE: 78 MMHG | HEART RATE: 78 BPM | BODY MASS INDEX: 30.03 KG/M2 | WEIGHT: 163.2 LBS | RESPIRATION RATE: 17 BRPM | SYSTOLIC BLOOD PRESSURE: 124 MMHG

## 2025-03-05 DIAGNOSIS — E66.811 OBESITY, CLASS I, BMI 30-34.9: Primary | ICD-10-CM

## 2025-03-05 DIAGNOSIS — E78.2 MIXED HYPERLIPIDEMIA: ICD-10-CM

## 2025-03-05 PROBLEM — M16.11 PRIMARY OSTEOARTHRITIS OF RIGHT HIP: Status: ACTIVE | Noted: 2025-03-05

## 2025-03-05 LAB — MRSA NOSE QL CULT: NORMAL

## 2025-03-05 PROCEDURE — 99214 OFFICE O/P EST MOD 30 MIN: CPT | Performed by: PHYSICIAN ASSISTANT

## 2025-03-05 RX ORDER — TIRZEPATIDE 7.5 MG/.5ML
INJECTION, SOLUTION SUBCUTANEOUS
Qty: 2 ML | Refills: 3 | Status: SHIPPED | OUTPATIENT
Start: 2025-03-05

## 2025-03-05 NOTE — DISCHARGE INSTR - AVS FIRST PAGE
Dr. Sotelo Hip Replacement    What to Expect/Activity  You are weight bearing as tolerated to your operative leg unless otherwise instructed. Use your walker or crutches. It is important to get up and walk for 10 minutes every hour, if possible.  Initial recovery therapy is focused on walking. If in your follow-up a referral to formal physical therapy is required, this will be provided based on your recovery.  You may sleep however you would like. Many patients feel more comfortable with a pillow between their legs and find it uncomfortably to lay on the operative side. If you do roll on that side at night you will not damage the replacement.  You may use a regular or elevated toilet seat, whichever is more comfortable.  We do not routinely require any specific precautions in terms of positioning of your leg and if so this will be taught to you by the physical therapists at the hospital. This means that you can dress as you are comfortable, including shoes and socks. You can bend down carefully to get items from the floor.   Swelling and discomfort causes pain. Elevate your surgical leg on 1-2 pillows placed behind your ankle/calf throughout the day, especially when you are laying down.  Please use ice packs for 20-30 minutes every 1-2 hours for 48-72 hours on the operative hip. Please make sure there is a barrier directly between your skin and your ice/ice pack.  Please use incentive spirometer 10 times per hour while awake (see diagram below).    Dressing/Wound Care/Bathing  There is a surgical dressing over your incision that stays in place until follow-up unless it starts to peel off.   You may start showering 24 hours after surgery, the surgical dressing will remain in place. Please pat the dressing dry. If you notice the dressing appears saturated or is starting to come off, please replace with a dry dressing.  Keep the dressing on until your follow-up in the office.   There may be dried blood around the  incision. It is ok to continue showering after removing the dressing but do not scrub the incision. Pat incision dry.  Do not place any creams, ointments or gels on or around the incision.  No baths, swimming or submerging until cleared by Dr. Sotelo.    Pain Management/Medications  You may resume your usual medications.  Please take the following medications:  Anti-coagulation (blood clot prevention) - aspirin 81mg twice daily for 4 weeks  Pain medication:  Narcotic Medication: Take as directed  NSAID (Anti-inflammatory): Take as directed  Tylenol 1000mg every 8 hours  Zofran (ondasetron) - 4mg every 8 hours as needed for nausea  Stool Softeners (senna/colace)- take daily to help prevent constipation as narcotic pain medication causes constipation  Antibiotic - take as directed if prescribed  If you have questions or pain concerns, please contact the office. Pain medication cannot remove all post-operative pain.    Follow up/Call if:  The findings of your surgery will be explained to you and your family immediately after surgery. However, in the post-operative period, during recovery from anesthesia you may not fully remember or fully understand what was said. We will go over this again when you return for your post-op appointment.  Please contact Dr. Sotelo's office if you experience the following:  Excessive bleeding (bleeding through your dressing)  Fever greater than 101 degrees F after the first 2 days (a slight fever is normal the first few days after surgery)  Persistent nausea or vomiting  Decreased sensation or discoloration of the operative limb  Pain or swelling that is getting worse and not better with medication    Dr. Sotelo's Office Contact: [phone]

## 2025-03-05 NOTE — PROGRESS NOTES
Assessment & Plan  Obesity, Class I, BMI 30-34.9  Continue Zepbound RX through [employer]  Will increase Zepbound to 7.5mg weekly  Advised her we can continue this transitional dose if working as she will be paying cash, but to contact the office if she would like to increase to Zepbound 10mg.   Discussed increased risk of side effects (especially constipation) with upcoming surgery due to anesthesia, decreased activity, and possible use of pain meds.  She will monitor for this.   Mixed hyperlipidemia  Continue Zetia and lifestyle modification.        Return in about 4 months (around 7/5/2025).       Subjective:   Chief Complaint   Patient presents with   • Follow-up     MWM F/u; Waist 35in       Patient ID: Kaya Connor  is a 69 y.o. female with excess weight/obesity here for Weight Management Follow up Visit    HPI: Here for Medical Weight Management Follow Up Visit    Obesity/Excess Weight:  Current BMI: Body mass index is 29.85 kg/m².   Initial Weight: 195  Last visit Weight: 169  Current Weight: 163    Current Weight Management Plan:   Current Medication: Zepbound 5mg (Vial through [employer] program)  Medication Side effects:  None, constipation has improved.   Calorie Intake:  1000 calorie per day on average, a little more while on cruise.     **Has been off injection due to hip replacement tomorrow, last dose 3/26.   Has 1 vial of Zepbound 5mg dose left, interested increase to 7.5mg dose.     Food Recall:  Similar to before.     Lifestyle History:    Exercise: Was on cruise-- did a lot of walking  Hip surgery replacement surgery tomorrow        Wt Readings from Last 20 Encounters:   03/05/25 74 kg (163 lb 3.2 oz)   02/19/25 75 kg (165 lb 6.4 oz)   02/17/25 74.4 kg (164 lb)   02/11/25 74.6 kg (164 lb 6.4 oz)   02/07/25 73.6 kg (162 lb 3.2 oz)   02/04/25 76.2 kg (168 lb)   01/20/25 76.2 kg (168 lb)   01/14/25 76.2 kg (168 lb)   01/13/25 76.7 kg (169 lb)   12/10/24 76.7 kg (169 lb)   12/10/24  "76.8 kg (169 lb 6.4 oz)   10/09/24 81.9 kg (180 lb 9.6 oz)   09/11/24 83.3 kg (183 lb 9.6 oz)   08/14/24 83.9 kg (185 lb)   07/16/24 84.6 kg (186 lb 9.6 oz)   07/09/24 83.4 kg (183 lb 12.8 oz)   12/12/23 83.1 kg (183 lb 3.2 oz)   11/22/23 82.6 kg (182 lb 3.2 oz)   09/22/23 82.7 kg (182 lb 6.4 oz)   06/12/23 84.8 kg (187 lb)        Review of Systems   Musculoskeletal:  Positive for arthralgias.   Psychiatric/Behavioral:  Positive for sleep disturbance.        Past Medical History:   Diagnosis Date   • Achilles tendinitis     Left ankle   • Allergic Seasonal   • Anesthesia     2/12/25 per pt - reports BP low with use of anesthesia   • Arthritis Back and hips   • Fibromyalgia, primary     hx of fibromyalgia   • Generalized anxiety disorder 06/06/2019   • Hip pain     RIGHT -  discomfort   • Hyperlipidemia     last assessed - [id number]   • Lumbar degenerative disc disease    • Sciatica, left side     last assessed - [id number]       Past Surgical History:   Procedure Laterality Date   • COLONOSCOPY      Complete colonoscopy - nml; resolved - [id number]   • DENTAL SURGERY      tooth- titanium implant - used liquid versed per pt   • LUMBAR EPIDURAL INJECTION      child delivery   • OTHER SURGICAL HISTORY      nasal surgery to remove basal cell lesion        Allergies   Allergen Reactions   • Statins Myalgia        Objective:    /78 (BP Location: Left arm, Patient Position: Sitting)   Pulse 78   Temp 97.9 °F (36.6 °C) (Tympanic)   Resp 17   Ht 5' 2\" (1.575 m)   Wt 74 kg (163 lb 3.2 oz)   BMI 29.85 kg/m²     Physical Exam:  Constitutional:  she  appears well-developed and well-nourished. No distress.   HENT:   Head: Normocephalic and atraumatic.   Neck: Normal range of motion.   Pulmonary/Chest: Effort normal.   Musculoskeletal: Normal range of motion.   Neurological: she  is alert and oriented to person, place, and time.   Skin: she  is not diaphoretic.   Psychiatric: she  has a normal mood and affect. her  " behavior is normal.        LABS:    Lab Results   Component Value Date    HGBA1C 5.0 01/24/2025      Lab Results   Component Value Date    SODIUM 141 01/24/2025    K 4.6 01/24/2025     01/24/2025    CO2 25 01/24/2025    AGAP 9 01/24/2025    BUN 18 01/24/2025    CREATININE 0.80 01/24/2025    GLUC 90 10/23/2020    GLUF 82 01/24/2025    CALCIUM 9.2 01/24/2025    AST 16 01/24/2025    ALT 12 01/24/2025    ALKPHOS 57 01/24/2025    TP 6.7 01/24/2025    TBILI 0.47 01/24/2025    EGFR 75 01/24/2025 01/24/2025   Lab Results   Component Value Date    BLC4SZVGPAHR 1.644 05/21/2024    TSH 1.36 10/23/2020

## 2025-03-06 ENCOUNTER — ANESTHESIA (OUTPATIENT)
Age: 70
End: 2025-03-06
Payer: MEDICARE

## 2025-03-06 ENCOUNTER — HOSPITAL ENCOUNTER (OUTPATIENT)
Age: 70
Setting detail: OUTPATIENT SURGERY
Discharge: HOME | End: 2025-03-07
Attending: STUDENT IN AN ORGANIZED HEALTH CARE EDUCATION/TRAINING PROGRAM | Admitting: STUDENT IN AN ORGANIZED HEALTH CARE EDUCATION/TRAINING PROGRAM
Payer: MEDICARE

## 2025-03-06 ENCOUNTER — APPOINTMENT (OUTPATIENT)
Age: 70
End: 2025-03-06
Payer: MEDICARE

## 2025-03-06 ENCOUNTER — HOSPITAL ENCOUNTER (OUTPATIENT)
Age: 70
Discharge: HOME | End: 2025-03-06
Payer: MEDICARE

## 2025-03-06 DIAGNOSIS — M16.11 PRIMARY OSTEOARTHRITIS OF RIGHT HIP: Primary | ICD-10-CM

## 2025-03-06 DIAGNOSIS — M16.11 PRIMARY OSTEOARTHRITIS OF RIGHT HIP: ICD-10-CM

## 2025-03-06 LAB
GLUCOSE SERPL-MCNC: 127 MG/DL (ref 65–140)
GLUCOSE SERPL-MCNC: 97 MG/DL (ref 65–140)

## 2025-03-06 PROCEDURE — 27130 TOTAL HIP ARTHROPLASTY: CPT | Performed by: STUDENT IN AN ORGANIZED HEALTH CARE EDUCATION/TRAINING PROGRAM

## 2025-03-06 PROCEDURE — C1776 JOINT DEVICE (IMPLANTABLE): HCPCS | Performed by: STUDENT IN AN ORGANIZED HEALTH CARE EDUCATION/TRAINING PROGRAM

## 2025-03-06 PROCEDURE — 99222 1ST HOSP IP/OBS MODERATE 55: CPT | Performed by: INTERNAL MEDICINE

## 2025-03-06 PROCEDURE — 82948 REAGENT STRIP/BLOOD GLUCOSE: CPT

## 2025-03-06 PROCEDURE — 97167 OT EVAL HIGH COMPLEX 60 MIN: CPT

## 2025-03-06 PROCEDURE — 27130 TOTAL HIP ARTHROPLASTY: CPT | Performed by: PHYSICIAN ASSISTANT

## 2025-03-06 PROCEDURE — C1713 ANCHOR/SCREW BN/BN,TIS/BN: HCPCS | Performed by: STUDENT IN AN ORGANIZED HEALTH CARE EDUCATION/TRAINING PROGRAM

## 2025-03-06 PROCEDURE — 73501 X-RAY EXAM HIP UNI 1 VIEW: CPT

## 2025-03-06 PROCEDURE — 0054T BONE SRGRY CMPTR FLUOR IMAGE: CPT | Performed by: STUDENT IN AN ORGANIZED HEALTH CARE EDUCATION/TRAINING PROGRAM

## 2025-03-06 PROCEDURE — 72170 X-RAY EXAM OF PELVIS: CPT

## 2025-03-06 PROCEDURE — 97163 PT EVAL HIGH COMPLEX 45 MIN: CPT | Performed by: PHYSICAL THERAPIST

## 2025-03-06 DEVICE — ACTIS DUOFIX HIP PROSTHESIS (FEMORAL STEM 12/14 TAPER CEMENTLESS SIZE 4 STD COLLAR)  CE
Type: IMPLANTABLE DEVICE | Site: HIP | Status: FUNCTIONAL
Brand: ACTIS

## 2025-03-06 DEVICE — EMPHASYS ACETABULAR SHELL THREE-HOLE 48MM CEMENTLESS
Type: IMPLANTABLE DEVICE | Site: HIP | Status: FUNCTIONAL
Brand: EMPHASYS

## 2025-03-06 DEVICE — EMPHASYS POLYETHYLENE LINER AOX NEUTRAL 48MM 36MM
Type: IMPLANTABLE DEVICE | Site: HIP | Status: FUNCTIONAL
Brand: EMPHASYS

## 2025-03-06 DEVICE — BIOLOX DELTA CERAMIC FEMORAL HEAD +1.5 36MM DIA 12/14 TAPER
Type: IMPLANTABLE DEVICE | Site: HIP | Status: FUNCTIONAL
Brand: BIOLOX DELTA

## 2025-03-06 DEVICE — PINNACLE CANCELLOUS BONE SCREW 6.5MM X 30MM
Type: IMPLANTABLE DEVICE | Site: HIP | Status: FUNCTIONAL
Brand: PINNACLE

## 2025-03-06 RX ORDER — ACETAMINOPHEN 325 MG/1
975 TABLET ORAL ONCE
Status: COMPLETED | OUTPATIENT
Start: 2025-03-06 | End: 2025-03-06

## 2025-03-06 RX ORDER — CEFAZOLIN SODIUM 2 G/50ML
2000 SOLUTION INTRAVENOUS ONCE
Status: COMPLETED | OUTPATIENT
Start: 2025-03-06 | End: 2025-03-06

## 2025-03-06 RX ORDER — CEFADROXIL 500 MG/1
500 CAPSULE ORAL EVERY 12 HOURS SCHEDULED
Qty: 2 CAPSULE | Refills: 0 | Status: SHIPPED | OUTPATIENT
Start: 2025-03-06 | End: 2025-03-07

## 2025-03-06 RX ORDER — ONDANSETRON 2 MG/ML
4 INJECTION INTRAMUSCULAR; INTRAVENOUS EVERY 6 HOURS PRN
Status: DISCONTINUED | OUTPATIENT
Start: 2025-03-06 | End: 2025-03-07 | Stop reason: HOSPADM

## 2025-03-06 RX ORDER — PROPOFOL 10 MG/ML
INJECTION, EMULSION INTRAVENOUS AS NEEDED
Status: DISCONTINUED | OUTPATIENT
Start: 2025-03-06 | End: 2025-03-06

## 2025-03-06 RX ORDER — CHLORHEXIDINE GLUCONATE ORAL RINSE 1.2 MG/ML
15 SOLUTION DENTAL ONCE
Status: COMPLETED | OUTPATIENT
Start: 2025-03-06 | End: 2025-03-06

## 2025-03-06 RX ORDER — ONDANSETRON 2 MG/ML
INJECTION INTRAMUSCULAR; INTRAVENOUS AS NEEDED
Status: DISCONTINUED | OUTPATIENT
Start: 2025-03-06 | End: 2025-03-06

## 2025-03-06 RX ORDER — ACETAMINOPHEN 325 MG/1
975 TABLET ORAL EVERY 8 HOURS
Status: DISCONTINUED | OUTPATIENT
Start: 2025-03-06 | End: 2025-03-07 | Stop reason: HOSPADM

## 2025-03-06 RX ORDER — SODIUM CHLORIDE, SODIUM LACTATE, POTASSIUM CHLORIDE, CALCIUM CHLORIDE 600; 310; 30; 20 MG/100ML; MG/100ML; MG/100ML; MG/100ML
125 INJECTION, SOLUTION INTRAVENOUS CONTINUOUS
Status: DISCONTINUED | OUTPATIENT
Start: 2025-03-06 | End: 2025-03-07 | Stop reason: HOSPADM

## 2025-03-06 RX ORDER — MEPERIDINE HYDROCHLORIDE 50 MG/ML
12.5 INJECTION INTRAMUSCULAR; INTRAVENOUS; SUBCUTANEOUS ONCE
Status: DISCONTINUED | OUTPATIENT
Start: 2025-03-06 | End: 2025-03-07 | Stop reason: HOSPADM

## 2025-03-06 RX ORDER — ACETAMINOPHEN 500 MG
1000 TABLET ORAL EVERY 8 HOURS
Qty: 60 TABLET | Refills: 0 | Status: SHIPPED | OUTPATIENT
Start: 2025-03-06

## 2025-03-06 RX ORDER — DOCUSATE SODIUM 100 MG/1
100 CAPSULE, LIQUID FILLED ORAL 2 TIMES DAILY
Status: DISCONTINUED | OUTPATIENT
Start: 2025-03-06 | End: 2025-03-07 | Stop reason: HOSPADM

## 2025-03-06 RX ORDER — CEFAZOLIN SODIUM 2 G/50ML
2000 SOLUTION INTRAVENOUS EVERY 8 HOURS
Status: COMPLETED | OUTPATIENT
Start: 2025-03-06 | End: 2025-03-07

## 2025-03-06 RX ORDER — CHLORHEXIDINE GLUCONATE 40 MG/ML
SOLUTION TOPICAL DAILY PRN
Status: DISCONTINUED | OUTPATIENT
Start: 2025-03-06 | End: 2025-03-06 | Stop reason: HOSPADM

## 2025-03-06 RX ORDER — DEXAMETHASONE SODIUM PHOSPHATE 10 MG/ML
INJECTION, SOLUTION INTRA-ARTICULAR; INTRALESIONAL; INTRAMUSCULAR; INTRAVENOUS; SOFT TISSUE AS NEEDED
Status: DISCONTINUED | OUTPATIENT
Start: 2025-03-06 | End: 2025-03-06

## 2025-03-06 RX ORDER — CELECOXIB 200 MG/1
200 CAPSULE ORAL 2 TIMES DAILY
Qty: 60 CAPSULE | Refills: 0 | Status: SHIPPED | OUTPATIENT
Start: 2025-03-06

## 2025-03-06 RX ORDER — GABAPENTIN 300 MG/1
300 CAPSULE ORAL
Status: DISCONTINUED | OUTPATIENT
Start: 2025-03-06 | End: 2025-03-07 | Stop reason: HOSPADM

## 2025-03-06 RX ORDER — OXYCODONE HYDROCHLORIDE 10 MG/1
10 TABLET ORAL EVERY 4 HOURS PRN
Status: DISCONTINUED | OUTPATIENT
Start: 2025-03-06 | End: 2025-03-07 | Stop reason: HOSPADM

## 2025-03-06 RX ORDER — FENTANYL CITRATE/PF 50 MCG/ML
25 SYRINGE (ML) INJECTION
Status: DISCONTINUED | OUTPATIENT
Start: 2025-03-06 | End: 2025-03-07 | Stop reason: HOSPADM

## 2025-03-06 RX ORDER — ALBUTEROL SULFATE 0.83 MG/ML
2.5 SOLUTION RESPIRATORY (INHALATION) ONCE AS NEEDED
Status: DISCONTINUED | OUTPATIENT
Start: 2025-03-06 | End: 2025-03-07 | Stop reason: HOSPADM

## 2025-03-06 RX ORDER — PROMETHAZINE HYDROCHLORIDE 25 MG/ML
12.5 INJECTION, SOLUTION INTRAMUSCULAR; INTRAVENOUS ONCE AS NEEDED
Status: DISCONTINUED | OUTPATIENT
Start: 2025-03-06 | End: 2025-03-07 | Stop reason: HOSPADM

## 2025-03-06 RX ORDER — CALCIUM CARBONATE 500 MG/1
1000 TABLET, CHEWABLE ORAL DAILY PRN
Status: DISCONTINUED | OUTPATIENT
Start: 2025-03-06 | End: 2025-03-07 | Stop reason: HOSPADM

## 2025-03-06 RX ORDER — ONDANSETRON 2 MG/ML
4 INJECTION INTRAMUSCULAR; INTRAVENOUS ONCE AS NEEDED
Status: DISCONTINUED | OUTPATIENT
Start: 2025-03-06 | End: 2025-03-07 | Stop reason: HOSPADM

## 2025-03-06 RX ORDER — PHENYLEPHRINE HCL IN 0.9% NACL 1 MG/10 ML
SYRINGE (ML) INTRAVENOUS AS NEEDED
Status: DISCONTINUED | OUTPATIENT
Start: 2025-03-06 | End: 2025-03-06

## 2025-03-06 RX ORDER — LABETALOL HYDROCHLORIDE 5 MG/ML
5 INJECTION, SOLUTION INTRAVENOUS
Status: DISCONTINUED | OUTPATIENT
Start: 2025-03-06 | End: 2025-03-07 | Stop reason: HOSPADM

## 2025-03-06 RX ORDER — OXYCODONE HYDROCHLORIDE 5 MG/1
5 TABLET ORAL EVERY 4 HOURS PRN
Qty: 42 TABLET | Refills: 0 | Status: SHIPPED | OUTPATIENT
Start: 2025-03-06 | End: 2025-03-16

## 2025-03-06 RX ORDER — TRANEXAMIC ACID 10 MG/ML
1000 INJECTION, SOLUTION INTRAVENOUS ONCE
Status: COMPLETED | OUTPATIENT
Start: 2025-03-06 | End: 2025-03-06

## 2025-03-06 RX ORDER — ONDANSETRON 4 MG/1
4 TABLET, ORALLY DISINTEGRATING ORAL EVERY 6 HOURS PRN
Qty: 20 TABLET | Refills: 0 | Status: SHIPPED | OUTPATIENT
Start: 2025-03-06

## 2025-03-06 RX ORDER — OXYCODONE HYDROCHLORIDE 5 MG/1
5 TABLET ORAL EVERY 4 HOURS PRN
Status: DISCONTINUED | OUTPATIENT
Start: 2025-03-06 | End: 2025-03-07 | Stop reason: HOSPADM

## 2025-03-06 RX ORDER — LORATADINE 10 MG/1
10 TABLET ORAL DAILY
Status: CANCELLED | OUTPATIENT
Start: 2025-03-07

## 2025-03-06 RX ORDER — EZETIMIBE 10 MG/1
10 TABLET ORAL DAILY
Status: CANCELLED | OUTPATIENT
Start: 2025-03-07

## 2025-03-06 RX ORDER — ASPIRIN 81 MG/1
81 TABLET ORAL 2 TIMES DAILY
Status: DISCONTINUED | OUTPATIENT
Start: 2025-03-06 | End: 2025-03-07 | Stop reason: HOSPADM

## 2025-03-06 RX ORDER — AMOXICILLIN 250 MG
1 CAPSULE ORAL DAILY
Qty: 30 TABLET | Refills: 0 | Status: SHIPPED | OUTPATIENT
Start: 2025-03-06

## 2025-03-06 RX ORDER — MIDAZOLAM HYDROCHLORIDE 2 MG/2ML
INJECTION, SOLUTION INTRAMUSCULAR; INTRAVENOUS AS NEEDED
Status: DISCONTINUED | OUTPATIENT
Start: 2025-03-06 | End: 2025-03-06

## 2025-03-06 RX ORDER — HYDROMORPHONE HCL/PF 1 MG/ML
0.5 SYRINGE (ML) INJECTION
Status: DISCONTINUED | OUTPATIENT
Start: 2025-03-06 | End: 2025-03-07 | Stop reason: HOSPADM

## 2025-03-06 RX ORDER — MAGNESIUM HYDROXIDE/ALUMINUM HYDROXICE/SIMETHICONE 120; 1200; 1200 MG/30ML; MG/30ML; MG/30ML
30 SUSPENSION ORAL EVERY 6 HOURS PRN
Status: DISCONTINUED | OUTPATIENT
Start: 2025-03-06 | End: 2025-03-07 | Stop reason: HOSPADM

## 2025-03-06 RX ORDER — ASPIRIN 81 MG/1
81 TABLET ORAL 2 TIMES DAILY
Qty: 60 TABLET | Refills: 0 | Status: SHIPPED | OUTPATIENT
Start: 2025-03-06

## 2025-03-06 RX ORDER — SENNOSIDES 8.6 MG
1 TABLET ORAL DAILY
Status: DISCONTINUED | OUTPATIENT
Start: 2025-03-06 | End: 2025-03-07 | Stop reason: HOSPADM

## 2025-03-06 RX ORDER — SODIUM CHLORIDE, SODIUM LACTATE, POTASSIUM CHLORIDE, CALCIUM CHLORIDE 600; 310; 30; 20 MG/100ML; MG/100ML; MG/100ML; MG/100ML
100 INJECTION, SOLUTION INTRAVENOUS CONTINUOUS
Status: DISCONTINUED | OUTPATIENT
Start: 2025-03-06 | End: 2025-03-07 | Stop reason: HOSPADM

## 2025-03-06 RX ORDER — CELECOXIB 200 MG/1
200 CAPSULE ORAL 2 TIMES DAILY
Status: CANCELLED | OUTPATIENT
Start: 2025-03-06

## 2025-03-06 RX ORDER — MAGNESIUM HYDROXIDE 1200 MG/15ML
LIQUID ORAL AS NEEDED
Status: DISCONTINUED | OUTPATIENT
Start: 2025-03-06 | End: 2025-03-06 | Stop reason: HOSPADM

## 2025-03-06 RX ADMIN — CEFAZOLIN SODIUM 2000 MG: 2 SOLUTION INTRAVENOUS at 10:56

## 2025-03-06 RX ADMIN — SODIUM CHLORIDE, SODIUM LACTATE, POTASSIUM CHLORIDE, AND CALCIUM CHLORIDE: .6; .31; .03; .02 INJECTION, SOLUTION INTRAVENOUS at 10:55

## 2025-03-06 RX ADMIN — ONDANSETRON 4 MG: 2 INJECTION INTRAMUSCULAR; INTRAVENOUS at 11:06

## 2025-03-06 RX ADMIN — PROPOFOL 80 MCG/KG/MIN: 10 INJECTION, EMULSION INTRAVENOUS at 11:00

## 2025-03-06 RX ADMIN — MORPHINE SULFATE 2 MG: 2 INJECTION, SOLUTION INTRAMUSCULAR; INTRAVENOUS at 13:34

## 2025-03-06 RX ADMIN — MEPIVACAINE HYDROCHLORIDE 3 ML: 15 INJECTION, SOLUTION EPIDURAL; INFILTRATION at 10:53

## 2025-03-06 RX ADMIN — SODIUM CHLORIDE, SODIUM LACTATE, POTASSIUM CHLORIDE, AND CALCIUM CHLORIDE: .6; .31; .03; .02 INJECTION, SOLUTION INTRAVENOUS at 10:49

## 2025-03-06 RX ADMIN — PROPOFOL 50 MG: 10 INJECTION, EMULSION INTRAVENOUS at 10:56

## 2025-03-06 RX ADMIN — MIDAZOLAM 2 MG: 1 INJECTION INTRAMUSCULAR; INTRAVENOUS at 10:48

## 2025-03-06 RX ADMIN — ACETAMINOPHEN 975 MG: 325 TABLET ORAL at 17:24

## 2025-03-06 RX ADMIN — ASPIRIN 81 MG: 81 TABLET, COATED ORAL at 20:32

## 2025-03-06 RX ADMIN — SENNOSIDES 8.6 MG: 8.6 TABLET, FILM COATED ORAL at 13:32

## 2025-03-06 RX ADMIN — Medication 100 MCG: at 11:54

## 2025-03-06 RX ADMIN — DEXAMETHASONE SODIUM PHOSPHATE 10 MG: 10 INJECTION, SOLUTION INTRA-ARTICULAR; INTRALESIONAL; INTRAMUSCULAR; INTRAVENOUS; SOFT TISSUE at 11:06

## 2025-03-06 RX ADMIN — CHLORHEXIDINE GLUCONATE 15 ML: 1.2 SOLUTION ORAL at 09:45

## 2025-03-06 RX ADMIN — GABAPENTIN 300 MG: 300 CAPSULE ORAL at 21:38

## 2025-03-06 RX ADMIN — SODIUM CHLORIDE, SODIUM LACTATE, POTASSIUM CHLORIDE, AND CALCIUM CHLORIDE 125 ML/HR: .6; .31; .03; .02 INJECTION, SOLUTION INTRAVENOUS at 13:30

## 2025-03-06 RX ADMIN — OXYCODONE HYDROCHLORIDE 10 MG: 10 TABLET ORAL at 15:12

## 2025-03-06 RX ADMIN — Medication 100 MCG: at 11:51

## 2025-03-06 RX ADMIN — TRANEXAMIC ACID 1000 MG: 10 INJECTION, SOLUTION INTRAVENOUS at 10:54

## 2025-03-06 RX ADMIN — PHENYLEPHRINE HYDROCHLORIDE 20 MCG/MIN: 10 INJECTION INTRAVENOUS at 11:15

## 2025-03-06 RX ADMIN — CEFAZOLIN SODIUM 2000 MG: 2 SOLUTION INTRAVENOUS at 19:46

## 2025-03-06 RX ADMIN — Medication 50 MCG: at 11:25

## 2025-03-06 RX ADMIN — DOCUSATE SODIUM 100 MG: 100 CAPSULE, LIQUID FILLED ORAL at 17:24

## 2025-03-06 RX ADMIN — OXYCODONE HYDROCHLORIDE 10 MG: 10 TABLET ORAL at 20:32

## 2025-03-06 RX ADMIN — ACETAMINOPHEN 975 MG: 325 TABLET ORAL at 09:45

## 2025-03-06 RX ADMIN — Medication 50 MCG: at 11:17

## 2025-03-06 NOTE — ASSESSMENT & PLAN NOTE
Postoperative day 0 status post right anterior KENDRICK, AVSS, 150 mL blood loss, moderate pain, some burning over incision, neurovascularly in tact    POD #0 s/p right anterior KENDRICK-   - Antibiotics: Ancef 1 g IV every 8 hour x 2 doses  - Anticoagulation: Aspirin 81 mg p.o. twice daily, SCDs, ambulation  - Activity: Weightbearing as tolerated, PT/OT    - AM lab draw: Morning labs with CBC and BMP  - Analgesia: Continue p.r.n. medication  - Dressing: keep clean, dry, and in tact,  - Disposition: Hopeful for discharge tomorrow

## 2025-03-06 NOTE — INTERVAL H&P NOTE
H&P reviewed. After examining the patient I find no changes in the patients condition since the H&P had been written. Plan for right KENDRICK.

## 2025-03-06 NOTE — ANESTHESIA POSTPROCEDURE EVALUATION
Post-Op Assessment Note    CV Status:  Stable  Pain Score: 0    Pain management: adequate       Mental Status:  Alert and awake   Hydration Status:  Euvolemic   PONV Controlled:  Controlled   Airway Patency:  Patent     Post Op Vitals Reviewed: Yes    No anethesia notable event occurred.    Staff: CRNA           Last Filed PACU Vitals:  Vitals Value Taken Time   Temp 97.2    Pulse 87    BP 97/52    Resp 16    SpO2 96

## 2025-03-06 NOTE — ANESTHESIA POSTPROCEDURE EVALUATION
Post-Op Assessment Note    CV Status:  Stable    Pain management: adequate       Mental Status:  Alert and awake   Hydration Status:  Euvolemic   PONV Controlled:  Controlled   Airway Patency:  Patent     Post Op Vitals Reviewed: Yes    No anethesia notable event occurred.    Staff: Anesthesiologist, CRNA           Last Filed PACU Vitals:  Vitals Value Taken Time   Temp 97.5 °F (36.4 °C) 03/06/25 1215   Pulse 80 03/06/25 1235   BP 96/52 03/06/25 1230   Resp 17 03/06/25 1235   SpO2 97 % 03/06/25 1235   Vitals shown include unfiled device data.    Modified Radha:     Vitals Value Taken Time   Activity 1 03/06/25 1230   Respiration 2 03/06/25 1230   Circulation 2 03/06/25 1230   Consciousness 2 03/06/25 1230   Oxygen Saturation 2 03/06/25 1230     Modified Radha Score: 9

## 2025-03-06 NOTE — OCCUPATIONAL THERAPY NOTE
Occupational Therapy Evaluation     Patient Name: Kaya Connor  Today's Date: 3/6/2025  Problem List  Principal Problem:    Primary osteoarthritis of right hip    Past Medical History  Past Medical History:   Diagnosis Date    Achilles tendinitis     Left ankle    Allergic Seasonal    Anesthesia     2/12/25 per pt - reports BP low with use of anesthesia    Arthritis Back and hips    Fibromyalgia, primary     hx of fibromyalgia    Generalized anxiety disorder 06/06/2019    Hip pain     RIGHT -  discomfort    Hyperlipidemia     last assessed - [id number]    Lumbar degenerative disc disease     Sciatica, left side     last assessed - [id number]     Past Surgical History  Past Surgical History:   Procedure Laterality Date    COLONOSCOPY      Complete colonoscopy - nml; resolved - [id number]    DENTAL SURGERY      tooth- titanium implant - used liquid versed per pt    LUMBAR EPIDURAL INJECTION      child delivery    OTHER SURGICAL HISTORY      nasal surgery to remove basal cell lesion        03/06/25 1439   OT Last Visit   OT Visit Date 03/06/25   Note Type   Note type Evaluation   Additional Comments pt greeted in supine, agreeable to OT evaluation   Pain Assessment   Pain Assessment Tool 0-10   Pain Score 6   Pain Location/Orientation Orientation: Right;Location: Hip   Hospital Pain Intervention(s) Repositioned;Ambulation/increased activity;Emotional support;Rest   Restrictions/Precautions   Weight Bearing Precautions Per Order Yes   RLE Weight Bearing Per Order WBAT   Other Precautions Chair Alarm;Bed Alarm;WBS;THR;Multiple lines;Fall Risk;Pain  (anterior hip approach)   Home Living   Type of Home House   Home Layout Multi-level;Performs ADLs on one level;1/2 bath on main level;Bed/bath upstairs;Stairs to enter without rails  (1 BRADLEY and FF to bed/bath w/ R rail)   Bathroom Shower/Tub Tub/shower unit   Bathroom Toilet Standard   Bathroom Equipment Grab bars in shower;Commode;Toilet raiser   Bathroom Accessibility  Accessible   Home Equipment Walker;Cane;Reacher   Additional Comments Pt plans to stay on main level for first few nights then go upstairs as she feels comfortable. 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)   Comments (+) , No AD use at baseline   Lifestyle   Autonomy Independent with all ADLs/IADls, no AD use at baseline   Reciprocal Relationships Spouse   Service to Others FTE   Intrinsic Gratification working out   General   Family/Caregiver Present Yes   ADL   Where Assessed Edge of bed   Eating Assistance 5  Supervision/Setup   Grooming Assistance 5  Supervision/Setup   UB Bathing Assistance 5  Supervision/Setup   LB Bathing Assistance 4  Minimal Assistance   UB Dressing Assistance 5  Supervision/Setup   LB Dressing Assistance 4  Minimal Assistance   Toileting Assistance  4  Minimal Assistance   Bed Mobility   Supine to Sit 5  Supervision   Additional items HOB elevated;Bedrails;Increased time required;Verbal cues   Sit to Supine Unable to assess   Additional Comments Pt greeted in supine, 144/84 supine   Transfers   Sit to Stand 5  Supervision   Additional items Increased time required;Verbal cues;Bedrails  (RW)   Stand to Sit 5  Supervision   Additional items Armrests;Increased time required;Verbal cues  (RW)   Additional Comments verbal cues for hand placement and safety with use of RW and S   Functional Mobility   Functional Mobility 5  Supervision   Additional Comments Pt completed in room functional mobility functional household distance with use of RW and S   Additional items Rolling walker   Balance   Static Sitting Good   Dynamic Sitting Fair +   Static Standing  Fair   Dynamic Standing Fair -   Ambulatory Fair -   Activity Tolerance   Activity Tolerance Patient tolerated treatment well   Medical Staff Made Aware PT Ali, Pt seen for co-evaluation/treatment with skilled Physical Therapy due to pt's medical complexity, decreased endurance, overall functional level, overall safety, and post surgical day #0.   Nurse Made Aware LESLIE Heck   RUE Assessment   RUE Assessment WFL   LUE Assessment   LUE Assessment WFL   Hand Function   Gross Motor Coordination Functional   Fine Motor Coordination Functional   Cognition   Overall Cognitive Status WFL   Arousal/Participation Alert;Cooperative   Attention Within functional limits   Orientation Level Oriented X4   Memory Within functional limits   Following Commands Follows all commands and directions without difficulty   Comments Cooperative and pleasant   Assessment   Limitation Decreased ADL status;Decreased endurance;Decreased self-care trans;Decreased high-level ADLs   Prognosis Good   Assessment Pt is a 69 y.o. female seen for OT evaluation s/p adm to [facility] on 3/6/2025 w/ Primary osteoarthritis of right hip . Comorbidities affecting pt’s functional performance include a significant PMH of arthritis, fibromyalgia, hyperlipidemia, lumbar DDD, sciatica L side. Pt with active OT orders and activity orders for Activity beginning POD #0. WBAT RLE. Pt lives with significant other in a multilevel house w/ 1 BRADLEY. Pt has 1/2 bath on main level. Tub/shower upstairs and raised toilets. At baseline, pt was independent with all ADLs/IADLs. Pt completed supine to sit with S. Pt completed sit to stand with use of RW and S. Pt completed short functional mobility in room distance with use of RW and S. Pt seated in chair with S use of armrests. Educated on ADLs/IADLs, transfers, LE management for independence at home. Upon evaluation, pt currently requires S for UB ADLs, [place] for LB ADLs, [place] for toileting, S for bed mobility, S for  functional mobility, and S for transfers 2* the following deficits impacting occupational performance: weakness, decreased strength , decreased balance, decreased activity tolerance, increased pain, and orthopedic restrictions. These impairments, as well at pt’s personal factors of: BRADLEY home environment, steps within home environment, difficulty performing ADLs, difficulty performing IADLs, difficulty performing transfers/mobility, WBS, fall risk , and new use of AD for functional transfers/mobility limit pt’s ability to safely engage in all baseline areas of occupation. Based on the aforementioned OT evaluation, functional performance deficits, and assessments, pt has been identified as a high complexity evaluation. Pt to continue to benefit from continued acute OT services during hospital stay to address defined deficits and to maximize level of functional independence in the following Occupational Performance areas: grooming, bathing/shower, toilet hygiene, dressing, health maintenance, functional mobility, community mobility, clothing management, cleaning, household maintenance, and job performance/volunteering. From OT standpoint, recommend III; Minimum Resource Intensity upon D/C. OT will continue to follow pt 3-5x/wk.   Goals   Patient Goals to get better   STG Time Frame 1-3   Short Term Goal #1 Pt will improve activity tolerance to G for min 30 min treatment sessions for increase engagement in functional tasks   Short Term Goal #2 Pt will complete bed mobility at a mod I level w/ G balance/safety demonstrated to decrease caregiver assistance required   Short Term Goal  Pt will complete LB dressing/self care w/ mod I using adaptive device and DME as needed   LTG Time Frame 3-7   Long Term Goal #1 Pt will complete toileting w/ mod I w/ G hygiene/thoroughness using DME as needed   Long Term Goal #2 Pt will improve functional transfers to mod I on/off all surfaces using DME as needed w/ G balance/safety   Long  Term Goal Pt will improve functional mobility during ADL/IADL/leisure tasks to mod I using DME as needed w/ G balance/safety   Plan   Treatment Interventions ADL retraining;Functional transfer training;Endurance training;Patient/family training;Equipment evaluation/education;Compensatory technique education;Continued evaluation;Energy conservation;Activityengagement   Goal Expiration Date 03/13/25   OT Treatment Day 0   OT Frequency 3-5x/wk   Discharge Recommendation   Rehab Resource Intensity Level, OT No post-acute rehabilitation needs   Additional Comments  The patient's raw score on the AM-PAC Daily Activity Inpatient Short Form is 19 . A raw score of greater than or equal to 19 suggests the patient may benefit from discharge to home. Please refer to the recommendation of the Occupational Therapist for safe discharge planning.   AM-PAC Daily Activity Inpatient   Lower Body Dressing 2   Bathing 3   Toileting 3   Upper Body Dressing 4   Grooming 3   Eating 4   Daily Activity Raw Score 19   Daily Activity Standardized Score (Calc for Raw Score >=11) 40.22   AM-PAC Applied Cognition Inpatient   Following a Speech/Presentation 4   Understanding Ordinary Conversation 4   Taking Medications 4   Remembering Where Things Are Placed or Put Away 4   Remembering List of 4-5 Errands 4   Taking Care of Complicated Tasks 4   Applied Cognition Raw Score 24   Applied Cognition Standardized Score 62.21   End of Consult   Education Provided Yes;Family or social support of family present for education by provider   Patient Position at End of Consult Bedside chair;Bed/Chair alarm activated;All needs within reach   Nurse Communication Nurse aware of consult   End of Consult Comments Pt seated OOB in chair with chair alarm activated at end of session. Call bell and phone within reach. All needs met and pt reports no further questions for OT at this time.   Landy Conrad, OT

## 2025-03-06 NOTE — OP NOTE
OPERATIVE REPORT  PATIENT NAME: Kaya Connor  : 1955  MRN: [id number]  Pt Location:  [facility] OR ROOM 03    Surgery Date: 3/6/2025    Surgeons and Role:     * Oleg Sotelo, DO - Primary     * MARISOL LirianoC - Assisting      * Kay Laguerre OT-C - Assisting     Preop Diagnosis:  Primary osteoarthritis of right hip [M16.11]    Post-Op Diagnosis Codes:     * Primary osteoarthritis of right hip [M16.11]    Procedure(s):  Right - ARTHROPLASTY HIP TOTAL ANTERIOR.NAVIGATED    Specimens:  * No specimens in log *    Estimated Blood Loss:   150 mL    Drains:  * No LDAs found *    Anesthesia Type:   Spinal     Operative Indications:  Primary osteoarthritis of right hip [M16.11]    69F with bone on bone, right hip osteoarthritis. Reviewed that on physical exam her bilateral knee pain is likely due to some referred pain from the hip and her known lumbar pathology. She has failed extensive non-operative management. The patient has elected to proceed with right KENDRICK through the anterior approach. Risks and benefits of surgery to include but not limited to bleeding, infection, damage to surrounding structures, hardware failure, instability, fracture, dislocation, leg length inequality, need for further surgery, continued pain, stiffness, blood clots, stroke, heart attack, and LFCN numbness was discussed with the patient.     Operative Findings:  Severe osteoarthritis  Native femoral head 47 mm    Implant Name Type Inv. Item Serial No.  Lot No. LRB No. Used Action   ACETABULAR SHELL 3HL 48MM CMNTLS EMPHASYS - [id number]  ACETABULAR SHELL 3HL 48MM CMNTLS EMPHASYS  [id number] Right 1 Implanted   SCREW MICHELLE 6.5 X 30MM SLF TAP PINNACLE - [id number]  SCREW MICHELLE 6.5 X 30MM SLF TAP PINNACLE  DEPUY [id number] Right 1 Implanted   LINER AOX NEUTRAL 48 X 36MM EMPHASYS - [id number]  LINER AOX NEUTRAL 48 X 36MM EMPHASYS  DEPUY [id number] Right 1 Implanted   STEM FEM SZ 4 TAPER CMNTLS STD COLLAR ACTIS - [id number]   STEM FEM SZ 4 TAPER CMNTLS STD COLLAR ACTIS  DEPUY [id number] Right 1 Implanted   HEAD FEMORAL 12/14 TPR 36MM +1.5MM ARTICULEZE BIOLOX DELTA - [id number]  HEAD FEMORAL 12/14 TPR 36MM +1.5MM ARTICULEZE BIOLOX DELTA  DEPUY [id number] Right 1 Implanted     Complications:   None    Hip Approach: Direct anterior    Chronic Narcotic Use:  No      Procedure and Technique:  The patient seen in the preoperative area. The informed consent was confirmed.  The operative site was confirmed and marked.  Patient was taken to the operating room and anesthesia was performed by the anesthesiologist.  The patient's bilateral lower extremities were well padded and placed in the in the hana table boots.  Patient was secured to the operating room table and all bony prominences were well padded.  Patient was given perioperative antibiotics per scip protocol.  A formal time-out was performed identifying the patient and correct surgical site.  The right lower extremity was prepped and draped in usual sterile fashion.  A 10 cm incision was made anteriorly approximately 2 fingerbreadths lateral and 3 fingerbreadths distal to ASIS avoiding the hip flexion crease sharply with a 10 blade.  Electrocautery was used to cauterize skin bleeders.  This was carried down through subcutaneous tissues to the level of the tensor fascia rosalba fascia.  Fascia was incised in line with the muscle fibers.  The tensor fascia rosalba muscle was then moved laterally and the inferior fascia was incised.  The branches of the lateral femoral cutaneous vessles were coagulated with electrocautery.  Retractors were placed carefully around the femoral neck.  Pericapsular fat was removed.  The anterior capsule was removed in its entirety.  Retractors were then carefully placed around the femoral neck.  Femoral neck was resected in line with our template.  A napkin ring was performed to better facilitate removal of the head.  Femoral head was removed with corkscrew.  Femoral head  measured approximately 47 mm. At this time retractors were carefully placed around the acetabulum.  The labrum was removed sharply. The inferior capsule was released.  Pulvinar was was removed to expose the medial wall of the acetabulum.  We started sequentially reaming at a size 45 medialized the medial wall.  We then sequentially reamed up to a size 48 with excellent chatter the Reamer.  We then impacted the Emphasys cluster hole 48 mm cup into place with the guidance of the [employer] hip navigation.  Our cup was placed at approximately 42° of abduction and 22 anteversion per navigation guidance. We drilled, measured and placed a 30 mm screw up the posterior column to augment fixation. Cup was well fixed and we placed the neutral Emphasys liner.  The liner was inspected and assured to be well seated.  At this time all the retractors were removed and attention was drawn to the femur.  Retractors were placed over the greater trochanter and along the medial calcar exposing the top of the femur. Medial capsule was released off the calcar and lateral capsule was resected allowing elevation of the femur into the wound.  We used the canal finer to localize the canal.  We then started broaching sequentially up to a size 4 Actis.  The size 4 had excellent rotational stability and canal fill.  We calcar planed down to the level of stem.  We trialed the hip with std offset with a +1.5 trial head.  The hip had excellent stability to 30° of internal rotation and 90° of external rotation with restoration of leg length and offset with the std offset and +1.5 head.  This was confirmed with intraoperative navigation via the [employer] hip navigation platform.  The trial femoral stem was removed and the real size 4 std offset stem was impacted into place.  The +1.5 36 mm ceramic head was impacted onto a dry trunnion.  At this time the hip was lavaged with Irrisept solution.  Hip was then irrigated with remainder of 3 L of normal saline  solution.  The deep tissues were again inspected for any bleeding from the lateral femoral cutaneous vessels or muscle bleeders.  Any bleeders were cauterized with electrocautery.  The fascia was then closed with interrupted 0 Vicryl followed by running #1 Stratafix suture.  The subcutaneous tissue was closed with interrupted 2-0 Vicryl.  The skin was then closed with running 4-0 Stratafix.  A Prineo dressing was placed sterilely.  The patient was awoken from anesthesia and taken to recovery room in stable condition.        Pre-surgical planning Xray  2.   Intraoperative use of [employer] Navigation system  3.   Intraoperative use of fluoroscopy and saved images        69F s/p R KENDRICK 3/6  - multi-modal pain control   - lucinda-op abx x 24hrs    - DVT ppx: asprin 81mg BID x 4 weeks  - PT/OT  - WBAT  - No formal hip precautions, no extremes of motion  - keep dressing in place until follow-up  - f/u 10-14 days     I was present for the entire procedure., A qualified resident physician was not available., and A physician assistant was required during the procedure for retraction, tissue handling, dissection and suturing.    Patient Disposition:  PACU       SIGNATURE: Oleg Sotelo DO  DATE: March 6, 2025  TIME: 11:57 AM

## 2025-03-06 NOTE — ANESTHESIA PREPROCEDURE EVALUATION
Procedure:  ARTHROPLASTY HIP TOTAL ANTERIOR,NAVIGATED (Right: Hip)    Relevant Problems   CARDIO   (+) Mixed hyperlipidemia      MUSCULOSKELETAL   (+) Disc degeneration, lumbar   (+) Osteoarthritis of left hip   (+) Primary osteoarthritis of right hip      NEURO/PSYCH   (+) Headache        Physical Exam    Airway    Mallampati score: III  TM Distance: >3 FB  Neck ROM: full     Dental   No notable dental hx     Cardiovascular      Pulmonary      Other Findings  post-pubertal.      Anesthesia Plan  ASA Score- 2     Anesthesia Type- spinal with ASA Monitors.         Additional Monitors:     Airway Plan:     Comment: Patient seen and examined.  History reviewed.  Patient to be done under spinal anesthesia with sedation, and GA as back-up.  Plan and risks discussed with the patient.  Consent obtained..       Plan Factors-Exercise tolerance (METS): >4 METS.    Chart reviewed.   Existing labs reviewed. Patient summary reviewed.                  Induction- intravenous.    Postoperative Plan- Plan for postoperative opioid use.         Informed Consent- Anesthetic plan and risks discussed with patient.  I personally reviewed this patient with the CRNA. Discussed and agreed on the Anesthesia Plan with the CRNA..      NPO Status:  Vitals Value Taken Time   Date of last liquid 03/05/25 03/06/25 0923   Time of last liquid 1900 03/06/25 0923   Date of last solid 03/05/25 03/06/25 0923   Time of last solid 1900 03/06/25 0923

## 2025-03-06 NOTE — PLAN OF CARE
Problem: PHYSICAL THERAPY ADULT  Goal: Performs mobility at highest level of function for planned discharge setting.  See evaluation for individualized goals.  Description: Treatment/Interventions: Functional transfer training, LE strengthening/ROM, Elevations, Therapeutic exercise, Endurance training, Bed mobility, Gait training, Spoke to nursing, OT, Family  Equipment Recommended: Walker       See flowsheet documentation for full assessment, interventions and recommendations.  Note: Prognosis: Good  Problem List: Decreased strength, Decreased range of motion, Decreased endurance, Impaired balance, Decreased mobility, Orthopedic restrictions, Pain  Assessment: Pt is a 69 y.o. female who presented to [facility] on 3/6/2025 s/p R KENDRICK, anterior approach done by Dr. Car. Precautions include WBAT. Pt  has a past medical history of Achilles tendinitis, Allergic (Seasonal), Anesthesia, Arthritis (Back and hips), Fibromyalgia, primary, Generalized anxiety disorder (06/06/2019), Hip pain, Hyperlipidemia, Lumbar degenerative disc disease, and Sciatica, left side.. Pt greeted in recliner chair for PT evaluation on 03/06/25. Pt referred to PT for functional mobility evaluation & D/C planning w/ orders of activity beginning POD #0 and activity as tolerated. PTA, pt reports being I w/o AD and I w/ IADLs. Personal factors affecting pt at time of IE include: lives in 2 story house and stairs to enter home. Please find objective findings from PT assessment regarding body systems outlined above with impairments and limitations including weakness, decreased ROM, impaired balance, decreased endurance, gait deviations, pain, decreased activity tolerance, decreased functional mobility tolerance, fall risk, and orthopedic restrictions.  Please see flow sheet above for objective findings and level of assistance required for safe completion of functional tasks. Pt able to perform supine to sit with S and use of bedrails. Pt able to perform  sit<>stand with RW and S. Pt able to ambulate 10 steps to door and back with S and RW. Pt demonstrated dec endurance and tolerance to activity. Denies reports of dizziness or SOB t/o session. Patient was left in recliner chair  with call bell and all needs within reach. Pt was educated on fall precautions and reinforced w/ good understanding. Based on pt presentation and impaired function, pt would benefit from level III, (minimal resource intensity) at D/C. From PT/mobility standpoint, pt would benefit from OPPT to address deficits as defined above and maximize level of independence and return pt to PLOF. HEP given and reviewed with patient, no questions at this time. CM to follow. Nsg staff to continue to mobilized pt (OOB in chair for all meals & ambulate in room/unit) as tolerated to prevent further decline in function. Nsg notified. Co-eval performed with OT to complete this evaluation for the pts best interest given pts medical complexity and functional level.  Barriers to Discharge: Inaccessible home environment (BRADLEY)     Rehab Resource Intensity Level, PT: III (Minimum Resource Intensity)    See flowsheet documentation for full assessment.

## 2025-03-06 NOTE — ANESTHESIA PROCEDURE NOTES
Spinal Block    Patient location during procedure: OR  Start time: 3/6/2025 10:53 AM  Reason for block: procedure for pain and at surgeon's request  Staffing  Performed by: Shannon Huggins CRNA  Authorized by: Oliverio Montelongo MD    Preanesthetic Checklist  Completed: patient identified, IV checked, site marked, risks and benefits discussed, surgical consent, monitors and equipment checked, pre-op evaluation and timeout performed  Spinal Block  Patient position: sitting  Prep: ChloraPrep and site prepped and draped  Patient monitoring: frequent blood pressure checks, continuous pulse ox and heart rate  Approach: midline  Location: L3-4  Needle  Needle type: Pencan   Needle gauge: 24 G  Needle length: 4 in  Assessment  Sensory level: T4  Injection Assessment:  negative aspiration for heme, no paresthesia on injection and positive aspiration for clear CSF.  Post-procedure:  site cleaned

## 2025-03-06 NOTE — PLAN OF CARE
Problem: OCCUPATIONAL THERAPY ADULT  Goal: Performs self-care activities at highest level of function for planned discharge setting.  See evaluation for individualized goals.  Description: Treatment Interventions: ADL retraining, Functional transfer training, Endurance training, Patient/family training, Equipment evaluation/education, Compensatory technique education, Continued evaluation, Energy conservation, Activityengagement          See flowsheet documentation for full assessment, interventions and recommendations.   Note: Limitation: Decreased ADL status, Decreased endurance, Decreased self-care trans, Decreased high-level ADLs  Prognosis: Good  Assessment: Pt is a 69 y.o. female seen for OT evaluation s/p adm to [facility] on 3/6/2025 w/ Primary osteoarthritis of right hip . Comorbidities affecting pt’s functional performance include a significant PMH of arthritis, fibromyalgia, hyperlipidemia, lumbar DDD, sciatica L side. Pt with active OT orders and activity orders for Activity beginning POD #0. WBAT RLE. Pt lives with significant other in a multilevel house w/ 1 BRADLEY. Pt has 1/2 bath on main level. Tub/shower upstairs and raised toilets. At baseline, pt was independent with all ADLs/IADLs. Pt completed supine to sit with S. Pt completed sit to stand with use of RW and S. Pt completed short functional mobility in room distance with use of RW and S. Pt seated in chair with S use of armrests. Educated on ADLs/IADLs, transfers, LE management for independence at home. Upon evaluation, pt currently requires S for UB ADLs, [place] for LB ADLs, [place] for toileting, S for bed mobility, S for functional mobility, and S for transfers 2* the following deficits impacting occupational performance: weakness, decreased strength , decreased balance, decreased activity tolerance, increased pain, and orthopedic restrictions. These impairments, as well at pt’s personal factors of: BRADLEY home environment, steps within home  environment, difficulty performing ADLs, difficulty performing IADLs, difficulty performing transfers/mobility, WBS, fall risk , and new use of AD for functional transfers/mobility limit pt’s ability to safely engage in all baseline areas of occupation. Based on the aforementioned OT evaluation, functional performance deficits, and assessments, pt has been identified as a high complexity evaluation. Pt to continue to benefit from continued acute OT services during hospital stay to address defined deficits and to maximize level of functional independence in the following Occupational Performance areas: grooming, bathing/shower, toilet hygiene, dressing, health maintenance, functional mobility, community mobility, clothing management, cleaning, household maintenance, and job performance/volunteering. From OT standpoint, recommend III; Minimum Resource Intensity upon D/C. OT will continue to follow pt 3-5x/wk.     Rehab Resource Intensity Level, OT: No post-acute rehabilitation needs

## 2025-03-06 NOTE — PHYSICAL THERAPY NOTE
PT EVALUATION    Pt. Name: Kaya Connor  Pt. Age: 69 y.o.  MRN: [id number]  LENGTH OF STAY: 0    Patient Active Problem List   Diagnosis    Basal cell carcinoma of nose    Disc degeneration, lumbar    Mixed hyperlipidemia    Overweight    Achilles tendon disorder, left    Restless leg    Asymptomatic postmenopausal state    Family history of abdominal aortic aneurysm (AAA)    Headache    Chronic fatigue    Osteoarthritis of left hip    Primary osteoarthritis of right hip       Admitting Diagnoses:   Primary osteoarthritis of right hip [M16.11]    Past Medical History:   Diagnosis Date    Achilles tendinitis     Left ankle    Allergic Seasonal    Anesthesia     2/12/25 per pt - reports BP low with use of anesthesia    Arthritis Back and hips    Fibromyalgia, primary     hx of fibromyalgia    Generalized anxiety disorder 06/06/2019    Hip pain     RIGHT -  discomfort    Hyperlipidemia     last assessed - [id number]    Lumbar degenerative disc disease     Sciatica, left side     last assessed - [id number]       Past Surgical History:   Procedure Laterality Date    COLONOSCOPY      Complete colonoscopy - nml; resolved - [id number]    DENTAL SURGERY      tooth- titanium implant - used liquid versed per pt    LUMBAR EPIDURAL INJECTION      child delivery    OTHER SURGICAL HISTORY      nasal surgery to remove basal cell lesion       Imaging Studies:  XR pelvis ap only 1 or 2 vw    (Results Pending)         03/06/25 1440   PT Last Visit   PT Visit Date 03/06/25   Note Type   Note type Evaluation   Additional Comments greeted in supine   Pain Assessment   Pain Assessment Tool 0-10   Pain Score 6   Pain Location/Orientation Orientation: Right;Location: Hip   Hospital Pain Intervention(s) Repositioned;Elevated;Emotional support;Rest   Restrictions/Precautions   Weight Bearing Precautions Per Order Yes   RLE Weight Bearing Per Order WBAT   Other Precautions WBS;THR;Bed Alarm;Chair Alarm;Fall Risk;Pain   Home Living   Type  of Home House   Home Layout Two level;Performs ADLs on one level;Able to live on main level with bedroom/bathroom;Stairs to enter with rails;Stairs to enter without rails;Bed/bath upstairs;1/2 bath on main level  (1 BRADLEY into. 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)   Comments (+)    General   Family/Caregiver Present Yes   Cognition   Overall Cognitive Status WFL   Arousal/Participation Alert   Orientation Level Oriented X4   Following Commands Follows all commands and directions without difficulty   RUE Assessment   RUE Assessment   (see OT note)   LUE Assessment   LUE Assessment   (see OT note)   RLE Assessment   RLE Assessment X   LLE Assessment   LLE Assessment WFL   Light Touch   RLE Light Touch Grossly intact   LLE Light Touch Grossly intact   Bed Mobility   Supine to Sit 5  Supervision   Additional items Increased time required;Bedrails;HOB elevated;Verbal cues   Sit to Supine Unable to assess   Additional Comments pt greeted in supine   Transfers   Sit to Stand 5  Supervision   Additional items Increased time required;Verbal cues  (RW)   Stand to Sit 5  Supervision   Additional items Increased time required;Verbal cues  (RW)   Ambulation/Elevation   Gait pattern Improper Weight shift;Antalgic;Decreased R stance;Step to;Excessively slow;Decreased hip extension;Decreased heel strike;Decreased toe off   Gait Assistance  5  Supervision   Additional items Verbal cues   Assistive Device Rolling walker   Distance 10 steps   Stair Management Assistance Not tested   Ambulation/Elevation Additional Comments cues for proper gait pattern with RW   Balance   Static Sitting Good   Dynamic Sitting Fair +   Static Standing Fair   Dynamic Standing Fair -   Ambulatory Fair -   Endurance Deficit   Endurance Deficit Yes   Endurance Deficit Description pain   Activity Tolerance   Activity Tolerance Patient tolerated treatment well   Medical Staff Made Aware RN JG Heck   Nurse Made Aware yes   Assessment   Prognosis Good   Problem List Decreased strength;Decreased range of motion;Decreased endurance;Impaired balance;Decreased mobility;Orthopedic restrictions;Pain   Assessment Pt is a 69 y.o. female who presented to [facility] on 3/6/2025 s/p R KENDRICK, anterior approach done by Dr. Car. Precautions include WBAT. Pt  has a past medical history of Achilles tendinitis, Allergic (Seasonal), Anesthesia, Arthritis (Back and hips), Fibromyalgia, primary, Generalized anxiety disorder (06/06/2019), Hip pain, Hyperlipidemia, Lumbar degenerative disc disease, and Sciatica, left side.. Pt greeted in recliner chair for PT evaluation on 03/06/25. Pt referred to PT for functional mobility evaluation & D/C planning w/ orders of activity beginning POD #0 and activity as tolerated. PTA, pt reports being I w/o AD and I w/ IADLs. Personal factors affecting pt at time of IE include: lives in 2 story house and stairs to enter home. Please find objective findings from PT assessment regarding body systems outlined above with impairments and limitations including weakness, decreased ROM, impaired balance, decreased endurance, gait deviations, pain, decreased activity tolerance, decreased functional mobility tolerance, fall risk, and orthopedic restrictions.  Please see flow sheet above for objective findings and level of assistance required for safe completion of functional  tasks. Pt able to perform supine to sit with S and use of bedrails. Pt able to perform sit<>stand with RW and S. Pt able to ambulate 10 steps to door and back with S and RW. Pt demonstrated dec endurance and tolerance to activity. Denies reports of dizziness or SOB t/o session. Patient was left in recliner chair  with call bell and all needs within reach. Pt was educated on fall precautions and reinforced w/ good understanding. Based on pt presentation and impaired function, pt would benefit from level III, (minimal resource intensity) at D/C. From PT/mobility standpoint, pt would benefit from OPPT to address deficits as defined above and maximize level of independence and return pt to PLOF. HEP given and reviewed with patient, no questions at this time. CM to follow. Nsg staff to continue to mobilized pt (OOB in chair for all meals & ambulate in room/unit) as tolerated to prevent further decline in function. Nsg notified. Co-eval performed with OT to complete this evaluation for the pts best interest given pts medical complexity and functional level.   Barriers to Discharge Inaccessible home environment  (BRADLEY)   Goals   Patient Goals to have less pain   STG Expiration Date 03/13/25   Short Term Goal #1 1).  Pt will perform bed mobility with Kristian demonstrating appropriate technique 100% of the time in order to improve function.2)  Perform all transfers with Kristian demonstrating safe and appropriate technique 100% of the time in order to improve ability to negotiate safely in home environment.3) Amb with least restrictive AD > 200'x1 with Kristian in order to demonstrate ability to negotiate in home environment.4)  Improve overall strength and balance 1/2 grade in order to optimize ability to perform functional tasks and reduce fall risk.5) Increase activity tolerance to 45 minutes in order to improve endurance to functional tasks.6)  Negotiate stairs using most appropriate technique and Kristian in order to be able to  negotiate safely in home environment. 7) PT for ongoing patient and family/caregiver education, DME needs and d/c planning in order to promote highest level of function in least restrictive environment.   Plan   Treatment/Interventions Functional transfer training;LE strengthening/ROM;Elevations;Therapeutic exercise;Endurance training;Bed mobility;Gait training;Spoke to nursing;OT;Family   PT Frequency Twice a day  (prn)   Discharge Recommendation   Rehab Resource Intensity Level, PT III (Minimum Resource Intensity)   Equipment Recommended Walker   Additional Comments The patient's AM-PAC Basic Mobility Inpatient Short Form Raw Score is 18. A Raw score of greater than 16 suggests the patient may benefit from discharge to home. Please also refer to the recommendation of the Physical Therapist for safe discharge planning.   AM-PAC Basic Mobility Inpatient   Turning in Flat Bed Without Bedrails 3   Lying on Back to Sitting on Edge of Flat Bed Without Bedrails 3   Moving Bed to Chair 3   Standing Up From Chair Using Arms 3   Walk in Room 3   Climb 3-5 Stairs With Railing 3   Basic Mobility Inpatient Raw Score 18   Basic Mobility Standardized Score 41.05   [facility] Highest Level Of Mobility   -HLM Goal 6: Walk 10 steps or more   -HLM Achieved 6: Walk 10 steps or more   End of Consult   Patient Position at End of Consult Bedside chair;Bed/Chair alarm activated;All needs within reach   End of Consult Comments pt stable, left seated in recliner at end of session with  in room. RN updated. call bell and alarm on     Hx/personal factors: co-morbidities, inaccessible home, pain, WB restrictions, and fall risk  Examination: dec mobility, dec balance, dec endurance, dec amb, risk for falls, pain, assessed body system, balance, endurance, amb, D/C disposition & fall risk, WB restrictions, impairements in locomotion, musculoskeletal, balance, endurance, posture, coordination  Clinical: unpredictable (ongoing medical  status, risk for falls, POD #0, and pain mgt)  Complexity: high  Estela Padilla, PT

## 2025-03-06 NOTE — CONSULTS
Consultation - Hospitalist   Name: Kaya Connor 69 y.o. female I MRN: [id number]  Unit/Bed#: WE 2 N -01 I Date of Admission: 3/6/2025   Date of Service: 3/6/2025 I Hospital Day: 0   Inpatient consult to Internal Medicine  Consult performed by: Uday Steinberg PA-C  Consult ordered by: Surekha Curtis PA-C        Physician Requesting Evaluation: Oleg Sotelo DO   Reason for Evaluation / Principal Problem: Medical management of comorbidities status post anterior right KENDRICK    Assessment & Plan  Primary osteoarthritis of right hip  Postoperative day 0 status post right anterior KENDRICK, AVSS, 150 mL blood loss, moderate pain, some burning over incision, neurovascularly in tact    POD #0 s/p right anterior KENDRICK-   - Antibiotics: Ancef 1 g IV every 8 hour x 2 doses  - Anticoagulation: Aspirin 81 mg p.o. twice daily, SCDs, ambulation  - Activity: Weightbearing as tolerated, PT/OT    - AM lab draw: Morning labs with CBC and BMP  - Analgesia: Continue p.r.n. medication  - Dressing: keep clean, dry, and in tact,  - Disposition: Hopeful for discharge tomorrow    Mixed hyperlipidemia  Stable, chronic    -Continue Zetia  Overweight  Improving, weight loss observed over time    -Continue tirzepatide 3 days after surgery  Restless leg  Stable, chronic    -Continue Neurontin  Hospitalist service will follow.      VTE Pharmacologic Prophylaxis:   High Risk (Score >/= 5) - Pharmacological DVT Prophylaxis Ordered: Aspirin 81 mg p.o. twice daily. Sequential Compression Devices Ordered.  Code Status: Level 1 - Full Code     History of Present Illness   Kaya Connor is a 69 y.o. female with a past medical history pertinent for general anxiety disorder, allergies, hyperlipidemia, fibromyalgia presenting to the medicine team for medical management of comorbidities status post right anterior hip arthroplasty.  Patient upon visiting reports that she is having some degree of increased discomfort that is 6 out of 10 without any  radicular type pain down the right lower extremity.  Patient reports most of her pain is discomfort and soreness in her right hip and quads as well as some burning type discomfort over the top of the incision.  Patient is waiting on pain medication.  Patient denies any numbness, tingling, lack of motor movement, lack of sensation down the the distal lower right extremity.  Other than discomfort with burning and soreness patient denies any other major issues currently.  Patient denies any other symptoms or concerns please refer to review of systems.    Review of Systems   Constitutional: Negative.    HENT: Negative.     Respiratory: Negative.     Cardiovascular: Negative.    Gastrointestinal: Negative.    Genitourinary: Negative.    Musculoskeletal:  Positive for arthralgias and myalgias.   Skin: Negative.    Neurological: Negative.    Psychiatric/Behavioral: Negative.         Historical Information   Past Medical History:   Diagnosis Date    Achilles tendinitis     Left ankle    Allergic Seasonal    Anesthesia     2/12/25 per pt - reports BP low with use of anesthesia    Arthritis Back and hips    Fibromyalgia, primary     hx of fibromyalgia    Generalized anxiety disorder 06/06/2019    Hip pain     RIGHT -  discomfort    Hyperlipidemia     last assessed - [id number]    Lumbar degenerative disc disease     Sciatica, left side     last assessed - [id number]     Past Surgical History:   Procedure Laterality Date    COLONOSCOPY      Complete colonoscopy - nml; resolved - [id number]    DENTAL SURGERY      tooth- titanium implant - used liquid versed per pt    LUMBAR EPIDURAL INJECTION      child delivery    OTHER SURGICAL HISTORY      nasal surgery to remove basal cell lesion     Social History     Tobacco Use    Smoking status: Never    Smokeless tobacco: Never    Tobacco comments:     Never a smoker or use of any tobacco products per pt    Vaping Use    Vaping status: Never Used   Substance and Sexual Activity    Alcohol  use: Yes     Comment: rare - occasional glass of wine    Drug use: Not Currently     Comment: medical marijuana card-- not using gummies at this time    Sexual activity: Yes     Partners: Male     Birth control/protection: None     Comment: Not needed and  partner had tests done- denies any chest pain or shortness of breath with activity     E-Cigarette/Vaping    E-Cigarette Use Never User     Comments Denies any use per pt      E-Cigarette/Vaping Substances     Family history non-contributory  Social History:  Marital Status: Single       Meds/Allergies   I have reviewed home medications using recent Epic encounter.  Prior to Admission medications    Medication Sig Start Date End Date Taking? Authorizing Provider   acetaminophen (TYLENOL) 500 mg tablet Take 2 tablets (1,000 mg total) by mouth every 8 (eight) hours 3/6/25  Yes Surekha Curtis PA-C   acetaminophen (TYLENOL) 650 mg CR tablet Take 1,300 mg by mouth as needed for mild pain   Yes Historical Provider, MD   ascorbic acid (VITAMIN C) 500 MG tablet Take 1 tablet (500 mg total) by mouth 2 (two) times a day  Patient taking differently: Take 500 mg by mouth 2 (two) times a day Surgeon prescribed 1/13/25  Yes Surekha Curtis PA-C   aspirin (ECOTRIN LOW STRENGTH) 81 mg EC tablet Take 1 tablet (81 mg total) by mouth 2 (two) times a day 3/6/25  Yes Surekha Curtis PA-C   cefadroxil (DURICEF) 500 mg capsule Take 1 capsule (500 mg total) by mouth every 12 (twelve) hours for 2 doses Take 1 capsule by mouth at 8pm the night of surgery and again at 8am the following morning for 2 total doses. 3/6/25 3/7/25 Yes Surekha Curtis PA-C   celecoxib (CeleBREX) 100 mg capsule Take 2 capsules (200 mg total) by mouth 2 (two) times a day 1/13/25  Yes Surekha Curtis PA-C   celecoxib (CeleBREX) 200 mg capsule Take 1 capsule (200 mg total) by mouth 2 (two) times a day 3/6/25  Yes Surekha Curtis PA-C   cetirizine (ZyrTEC) 10 mg tablet Take 10 mg by mouth if needed for  allergies   Yes Historical Provider, MD   cholecalciferol (VITAMIN D3) 25 mcg (1,000 units) tablet Take 3,000 Units by mouth daily 1200 Calcium - within the vitamin D3 9/6/24  Yes Historical Provider, MD   ezetimibe (ZETIA) 10 mg tablet Take 1 tablet (10 mg total) by mouth daily 8/14/24  Yes Godfrey Rodriguez, DO   folic acid (FOLVITE) 1 mg tablet Take 1 tablet (1 mg total) by mouth daily 1/13/25  Yes Surekha Curtis PA-C   gabapentin (NEURONTIN) 100 mg capsule 2 tablets daily at bedtime 2/18/25  Yes Yumiko Mack, DO   Magnesium 250 MG TABS Take by mouth daily at bedtime   Yes Historical Provider, MD   Multiple Vitamins-Minerals (multivitamin with minerals) tablet Take 1 tablet by mouth daily 1/13/25  Yes Surekha Curtis PA-C   mupirocin (BACTROBAN) 2 % ointment Apply topically to the inside of the left and right nostrils twice daily for 5 days before surgery, including the morning of surgery. 1/13/25  Yes Surekha Curtis PA-C   ondansetron (ZOFRAN-ODT) 4 mg disintegrating tablet Take 1 tablet (4 mg total) by mouth every 6 (six) hours as needed for nausea or vomiting 3/6/25  Yes Surekha Curtis PA-C   oxyCODONE (Roxicodone) 5 immediate release tablet Take 1 tablet (5 mg total) by mouth every 4 (four) hours as needed for moderate pain or severe pain for up to 10 days Max Daily Amount: 30 mg 3/6/25 3/16/25 Yes Surekha Curtis PA-C   senna-docusate sodium (SENOKOT S) 8.6-50 mg per tablet Take 1 tablet by mouth daily 3/6/25  Yes Surekha Curtis PA-C   Cyanocobalamin (Vitamin B-12) 500 MCG SUBL Take by mouth    Historical Provider, MD   tirzepatide (Zepbound) 7.5 mg/0.5 mL auto-injector Inject 7.5mg weekly Disp Vial (NDC not available in EMR) 3/5/25   Daryl Bradley PA-C     Allergies   Allergen Reactions    Statins Myalgia       Objective :  Temp:  [97.5 °F (36.4 °C)-98.2 °F (36.8 °C)] 97.7 °F (36.5 °C)  HR:  [64-96] 93  BP: ()/(52-84) 147/78  Resp:  [16-20] 18  SpO2:  [95 %-98 %] 95 %  O2 Device: None  (Room air)    Physical Exam  Constitutional:       General: She is awake. She is not in acute distress.     Appearance: Normal appearance. She is normal weight. She is not ill-appearing, toxic-appearing or diaphoretic.      Interventions: She is not intubated.  HENT:      Head: Normocephalic and atraumatic.      Right Ear: Hearing and external ear normal.      Left Ear: Hearing and external ear normal.      Nose: Nose normal.   Cardiovascular:      Rate and Rhythm: Normal rate and regular rhythm.      Pulses:           Dorsalis pedis pulses are 1+ on the right side and 1+ on the left side.      Heart sounds: Normal heart sounds, S1 normal and S2 normal. Heart sounds not distant. No murmur heard.  Pulmonary:      Effort: Pulmonary effort is normal. No tachypnea, bradypnea, accessory muscle usage, prolonged expiration, respiratory distress or retractions. She is not intubated.      Breath sounds: Normal breath sounds. No stridor, decreased air movement or transmitted upper airway sounds. No decreased breath sounds, wheezing, rhonchi or rales.   Abdominal:      Palpations: Abdomen is soft.      Tenderness: There is no abdominal tenderness.   Musculoskeletal:      Right foot: Normal range of motion. No deformity.      Left foot: Normal range of motion. No deformity.   Feet:      Right foot:      Protective Sensation: 3 sites tested.  3 sites sensed.      Skin integrity: Skin integrity normal.      Left foot:      Protective Sensation: 3 sites tested.  3 sites sensed.      Skin integrity: Skin integrity normal.   Skin:     General: Skin is warm.      Capillary Refill: Capillary refill takes less than 2 seconds.   Psychiatric:         Behavior: Behavior is cooperative.      Right Lower Extremity: Thigh and calf compartments are soft and nontender to palpation. + L3-S1 SILT. + DF/PF.+ EHL. No pain with passive stretch/extension of toes. DP 1+, capillary refill less than 2 seconds, and foot is warm B/L. Incision is  c/d/i      Lines/Drains:            Lab Results: I have reviewed the following results:              Results from last 7 days   Lab Units 03/06/25  1517 03/06/25  0923   POC GLUCOSE mg/dl 127 97     Lab Results   Component Value Date    HGBA1C 5.0 01/24/2025    HGBA1C 5.3 09/26/2024    HGBA1C 5.3 06/02/2023           Imaging Results Review: No pertinent imaging studies reviewed.  Other Study Results Review: No additional pertinent studies reviewed.    Administrative Statements   Approximately 35 minutes spent with reviewing chart, developing plan, placing orders, reviewing labs, reviewing imaging, reviewing history, physical exam, history taking, patient education, documentation and chart    ** Please Note: This note has been constructed using a voice recognition system. **

## 2025-03-07 VITALS
BODY MASS INDEX: 28.17 KG/M2 | HEIGHT: 63 IN | TEMPERATURE: 98.6 F | OXYGEN SATURATION: 97 % | HEART RATE: 71 BPM | SYSTOLIC BLOOD PRESSURE: 113 MMHG | WEIGHT: 159 LBS | RESPIRATION RATE: 18 BRPM | DIASTOLIC BLOOD PRESSURE: 59 MMHG

## 2025-03-07 LAB
ANION GAP SERPL CALCULATED.3IONS-SCNC: 8 MMOL/L (ref 4–13)
BUN SERPL-MCNC: 16 MG/DL (ref 5–25)
CALCIUM SERPL-MCNC: 8.8 MG/DL (ref 8.4–10.2)
CHLORIDE SERPL-SCNC: 108 MMOL/L (ref 96–108)
CO2 SERPL-SCNC: 24 MMOL/L (ref 21–32)
CREAT SERPL-MCNC: 0.77 MG/DL (ref 0.6–1.3)
ERYTHROCYTE [DISTWIDTH] IN BLOOD BY AUTOMATED COUNT: 12.6 % (ref 11.6–15.1)
GFR SERPL CREATININE-BSD FRML MDRD: 79 ML/MIN/1.73SQ M
GLUCOSE SERPL-MCNC: 123 MG/DL (ref 65–140)
HCT VFR BLD AUTO: 33.2 % (ref 34.8–46.1)
HGB BLD-MCNC: 11.6 G/DL (ref 11.5–15.4)
MCH RBC QN AUTO: 33.6 PG (ref 26.8–34.3)
MCHC RBC AUTO-ENTMCNC: 34.9 G/DL (ref 31.4–37.4)
MCV RBC AUTO: 96 FL (ref 82–98)
PLATELET # BLD AUTO: 242 THOUSANDS/UL (ref 149–390)
PMV BLD AUTO: 10.8 FL (ref 8.9–12.7)
POTASSIUM SERPL-SCNC: 4 MMOL/L (ref 3.5–5.3)
RBC # BLD AUTO: 3.45 MILLION/UL (ref 3.81–5.12)
SODIUM SERPL-SCNC: 140 MMOL/L (ref 135–147)
WBC # BLD AUTO: 13.31 THOUSAND/UL (ref 4.31–10.16)

## 2025-03-07 PROCEDURE — 97116 GAIT TRAINING THERAPY: CPT

## 2025-03-07 PROCEDURE — 80048 BASIC METABOLIC PNL TOTAL CA: CPT | Performed by: PHYSICIAN ASSISTANT

## 2025-03-07 PROCEDURE — 99024 POSTOP FOLLOW-UP VISIT: CPT | Performed by: PHYSICIAN ASSISTANT

## 2025-03-07 PROCEDURE — 85027 COMPLETE CBC AUTOMATED: CPT | Performed by: PHYSICIAN ASSISTANT

## 2025-03-07 PROCEDURE — 99232 SBSQ HOSP IP/OBS MODERATE 35: CPT | Performed by: INTERNAL MEDICINE

## 2025-03-07 PROCEDURE — 97535 SELF CARE MNGMENT TRAINING: CPT

## 2025-03-07 PROCEDURE — 97530 THERAPEUTIC ACTIVITIES: CPT

## 2025-03-07 RX ORDER — EZETIMIBE 10 MG/1
10 TABLET ORAL DAILY
Status: DISCONTINUED | OUTPATIENT
Start: 2025-03-07 | End: 2025-03-07 | Stop reason: HOSPADM

## 2025-03-07 RX ADMIN — OXYCODONE HYDROCHLORIDE 10 MG: 10 TABLET ORAL at 05:40

## 2025-03-07 RX ADMIN — OXYCODONE HYDROCHLORIDE 10 MG: 10 TABLET ORAL at 01:15

## 2025-03-07 RX ADMIN — DOCUSATE SODIUM 100 MG: 100 CAPSULE, LIQUID FILLED ORAL at 08:53

## 2025-03-07 RX ADMIN — SENNOSIDES 8.6 MG: 8.6 TABLET, FILM COATED ORAL at 08:53

## 2025-03-07 RX ADMIN — OXYCODONE HYDROCHLORIDE 10 MG: 10 TABLET ORAL at 08:53

## 2025-03-07 RX ADMIN — ASPIRIN 81 MG: 81 TABLET, COATED ORAL at 08:53

## 2025-03-07 RX ADMIN — ACETAMINOPHEN 975 MG: 325 TABLET ORAL at 05:39

## 2025-03-07 RX ADMIN — CEFAZOLIN SODIUM 2000 MG: 2 SOLUTION INTRAVENOUS at 05:41

## 2025-03-07 RX ADMIN — EZETIMIBE 10 MG: 10 TABLET ORAL at 09:46

## 2025-03-07 NOTE — PLAN OF CARE
Problem: PHYSICAL THERAPY ADULT  Goal: Performs mobility at highest level of function for planned discharge setting.  See evaluation for individualized goals.  Description: Treatment/Interventions: Functional transfer training, LE strengthening/ROM, Therapeutic exercise, Elevations, Endurance training, Patient/family training, Equipment eval/education, Bed mobility, Gait training, Compensatory technique education, Continued evaluation, Spoke to nursing, Family  Equipment Recommended: Walker (pt has)       See flowsheet documentation for full assessment, interventions and recommendations.  Outcome: Completed  Note: Prognosis: Good  Problem List: Decreased strength, Decreased range of motion, Decreased endurance, Impaired balance, Decreased mobility, Orthopedic restrictions, Pain  Assessment: Ольга is seen for progression of PT POC to facilitate discharge to home. Improved with all aspects of mobility. S for transfers and ambulation with RW support. Able to increase distances and demonstrate step through pattern.  Negotiates stairs with step to pattern using cane + rail to simulate home set up.  Tolerated mobility training well.  Reviewed education handouts, HEP with progression in reps and frequency of program.  At this time is cleared from PT perspective for d/c to home with family support and OPPT.  The patient's AM-[facility] Basic Mobility Inpatient Short Form Raw Score is 23. A Raw score of greater than 16 suggests the patient may benefit from discharge to home. Please also refer to the recommendation of the Physical Therapist for safe discharge planning. OPPT at d/c.  Barriers to Discharge: Inaccessible home environment  Barriers to Discharge Comments: stairs  Rehab Resource Intensity Level, PT: III (Minimum Resource Intensity) (OPPT)    See flowsheet documentation for full assessment.

## 2025-03-07 NOTE — ASSESSMENT & PLAN NOTE
Postoperative day 1 status post right anterior KENDRICK, AVSS, some burning over incision, neurovascularly in tact, still experiencing some degree of proximal thigh discomfort    POD #1 s/p right anterior KENDRICK-   - Antibiotics: Ancef 1 g IV every 8 hour x 2 doses  - Anticoagulation: Aspirin 81 mg p.o. twice daily, SCDs, ambulation  - Activity: Weightbearing as tolerated, PT/OT    - AM lab draw: Morning labs with CBC and BMP  - Analgesia: Continue p.r.n. medication  - Dressing: keep clean, dry, and in tact,  - Disposition: Hopeful for discharge tomorrow

## 2025-03-07 NOTE — PROGRESS NOTES
Progress Note - Hospitalist   Name: Kaya Connor 69 y.o. female I MRN: [id number]  Unit/Bed#: WE 2 N -01 I Date of Admission: 3/6/2025   Date of Service: 3/7/2025 I Hospital Day: 0     Assessment & Plan  Primary osteoarthritis of right hip  Postoperative day 1 status post right anterior KENDRICK, AVSS, some burning over incision, neurovascularly in tact, still experiencing some degree of proximal thigh discomfort    POD #1 s/p right anterior KENDRICK-   - Antibiotics: Ancef 1 g IV every 8 hour x 2 doses  - Anticoagulation: Aspirin 81 mg p.o. twice daily, SCDs, ambulation  - Activity: Weightbearing as tolerated, PT/OT    - AM lab draw: Morning labs with CBC and BMP  - Analgesia: Continue p.r.n. medication  - Dressing: keep clean, dry, and in tact,  - Disposition: Hopeful for discharge tomorrow    Mixed hyperlipidemia  Stable, chronic    -Continue Zetia  Overweight  Improving, weight loss observed over time    -Continue tirzepatide 3 days after surgery  Restless leg  Stable, chronic    -Continue Neurontin    Hospitalist service will follow.    Subjective   Patient was seen and examined at bedside.  Patient denies any acute events overnight.  Patient had better pain control and was able to get some rest later into the evening.  Patient still reports some discomfort in her proximal thigh as well as having the burning type discomfort overlying her incision and medial thigh.  Patient denies any distal numbness, tingling, lack of motor movement, lack of sensation.  Patient has been getting up to ambulate in order to go to the bathroom.  Patient has felt quite steady on her feet.  Patient just did not sleep well due to the combination of discomfort and being in a foreign environment.  Patient is eager to be discharged but knows that she has to work with PT/OT.    Objective :  Temp:  [97.5 °F (36.4 °C)-98.2 °F (36.8 °C)] 98.2 °F (36.8 °C)  HR:  [64-96] 72  BP: ()/(52-84) 121/80  Resp:  [16-20] 18  SpO2:  [94 %-98 %] 94  %  O2 Device: None (Room air)    I/O         03/05 0701 03/06 0700 03/06 0701  03/07 0700    P.O.  360    I.V. (mL/kg)  1500 (20.8)    Total Intake(mL/kg)  1860 (25.8)    Urine (mL/kg/hr)  1200    Blood  150    Total Output  1350    Net  +510                  Physical Exam  Constitutional:       General: She is awake. She is not in acute distress.     Appearance: Normal appearance. She is normal weight. She is not ill-appearing, toxic-appearing or diaphoretic.   Cardiovascular:      Rate and Rhythm: Normal rate.   Pulmonary:      Effort: Pulmonary effort is normal.   Skin:     General: Skin is warm and dry.   Psychiatric:         Behavior: Behavior is cooperative.     right Lower Extremity: Thigh and calf compartments are soft and nontender to palpation. + L3-S1 SILT. + DF/PF.+ EHL. No pain with passive stretch/extension of toes. capillary refill less than 2 seconds, and foot is warm B/L. Incision is c/d/i      Lab Results: I have reviewed the following results:  Recent Labs     03/07/25  0551   WBC 13.31*   HGB 11.6   HCT 33.2*          Imaging Results Review: No pertinent imaging studies reviewed.  Other Study Results Review: No additional pertinent studies reviewed.    VTE Pharmacologic Prophylaxis: Aspirin 81 mg p.o. twice daily  VTE Mechanical Prophylaxis: sequential compression device

## 2025-03-07 NOTE — ASSESSMENT & PLAN NOTE
POD 1 s/p right anterior KENDRICK with Dr. Sotelo 3/6/2025  -WBAT RLE  -No formal hip precautions, no extremes of motion.  -DVT ppx:  ASA 81mg BID x 4 weeks  -PT/OT  -Pain control  -H 11.6 this AM, VSS, monitor  -Discharge planning, home today.

## 2025-03-07 NOTE — OCCUPATIONAL THERAPY NOTE
Occupational Therapy Progress Note     Patient Name: Kaya Connor  Today's Date: 3/7/2025  Problem List  Principal Problem:    Primary osteoarthritis of right hip  Active Problems:    Mixed hyperlipidemia    Overweight    Restless leg       03/07/25 0753   OT Last Visit   OT Visit Date 03/07/25   Note Type   Note Type Treatment   Pain Assessment   Pain Assessment Tool 0-10   Pain Score 4   Pain Location/Orientation Orientation: Right;Location: Hip   Hospital Pain Intervention(s) Repositioned;Ambulation/increased activity;Emotional support;Rest   Restrictions/Precautions   Weight Bearing Precautions Per Order Yes   RLE Weight Bearing Per Order WBAT   Other Precautions Chair Alarm;WBS;THR;Multiple lines;Fall Risk;Pain  (anterior hip approach)   Lifestyle   Autonomy Independent with all ADLs/IADls, no AD use at baseline   Reciprocal Relationships Spouse   Service to Others FTE   Intrinsic Gratification working out   ADL   Where Assessed Chair   Grooming Assistance 5  Supervision/Setup   Grooming Deficit Verbal cueing;Supervision/safety;Standing with assistive device   UB Dressing Assistance 5  Supervision/Setup   UB Dressing Deficit Setup;Increased time to complete;Supervision/safety   LB Dressing Assistance 5  Supervision/Setup   LB Dressing Deficit Setup;Requires assistive device for steadying;Verbal cueing;Supervision/safety;Increased time to complete   Toileting Assistance  5  Supervision/Setup   Toileting Deficit Setup;Verbal cueing;Supervison/safety;Increased time to complete;Grab bar use;Bedside commode  (BSC over standard toilet)   Functional Standing Tolerance   Time ~1-2 min   Activity standing for dressing tasks   Comments use of RW for stability   Bed Mobility   Supine to Sit Unable to assess   Sit to Supine Unable to assess   Additional Comments Pt greeted OOB in recliner   Transfers   Sit to Stand 5  Supervision   Additional items Armrests;Increased time required;Verbal cues  (RW)   Stand to Sit 5   Supervision   Additional items Armrests;Increased time required;Verbal cues  (RW)   Additional Comments pt w/ good carryover of cues from previous session   Functional Mobility   Functional Mobility 5  Supervision   Additional Comments Pt completed functional mobility in room distance with use of RW and S   Additional items Rolling walker   Toilet Transfers   Toilet Transfer From Other (Comment)  (Chair)   Toilet Transfer Type To and from   Toilet Transfer to Standard toilet  (BSC over)   Toilet Transfer Technique Ambulating   Toilet Transfers Supervision   Toilet Transfers Comments RW   Cognition   Overall Cognitive Status WFL   Arousal/Participation Alert;Cooperative   Attention Within functional limits   Orientation Level Oriented X4   Memory Within functional limits   Following Commands Follows all commands and directions without difficulty   Comments Cooperative and pleasant   Activity Tolerance   Activity Tolerance Patient tolerated treatment well   Medical Staff Made Aware RN Kristan   Assessment   Assessment Pt seen for OT treatment session focusing on ADLs/IADLs, functional mobility, functional standing tolerance, functional transfers, patient education, continued evaluation. Pt greeted OOB in recliner at start of session. Pt alert and cooperative throughout session. Pt with good sitting balance and fair - dynamic standing balance. Pt tolerated treatment well. Pt completed don of underwear seated in chair, then standing to pull over waist. Pt then completed functional mobility from chair to bathroom with S and use of RW. Pt completed toilet transfer onto BSC over standard toilet with S. Pt completed toileting with S, perineal hygiene in seated. Pt completed grooming at sink with S and use of RW for stability. Pt completed functional mobility back to chair with S and use of RW. Pt completed don of pants with S seated then standing to pull over waist with RW for stability as needed. Pt completed don of  sweatshirt with S in standing. Pt educated on all ADLs/IADls, transfers, and LE management for independence and safety at home. Pt reports 4/10 pain and no dizziness. Pt's vitals include: stable.     Pt ended session seated OOB in recliner. Call bell and phone within reach. All needs met and pt reports no further questions at this time. Continue to recommend No post-acute rehabilitation needs when medically cleared. OT will continue to follow pt on caseload.   Plan   Treatment Interventions ADL retraining;Functional transfer training;Endurance training;Patient/family training;Equipment evaluation/education;Compensatory technique education;Continued evaluation;Energy conservation;Activityengagement   Goal Expiration Date 03/13/25   OT Treatment Day 0   OT Frequency 3-5x/wk   Discharge Recommendation   Rehab Resource Intensity Level, OT No post-acute rehabilitation needs   Additional Comments  The patient's raw score on the AM-PAC Daily Activity Inpatient Short Form is 21. A raw score of greater than or equal to 19 suggests the patient may benefit from discharge to home. Please refer to the recommendation of the Occupational Therapist for safe discharge planning.   -PAC Daily Activity Inpatient   Lower Body Dressing 3   Bathing 3   Toileting 3   Upper Body Dressing 4   Grooming 4   Eating 4   Daily Activity Raw Score 21   Daily Activity Standardized Score (Calc for Raw Score >=11) 44.27   AM-PAC Applied Cognition Inpatient   Following a Speech/Presentation 4   Understanding Ordinary Conversation 4   Taking Medications 4   Remembering Where Things Are Placed or Put Away 4   Remembering List of 4-5 Errands 4   Taking Care of Complicated Tasks 4   Applied Cognition Raw Score 24   Applied Cognition Standardized Score 62.21   End of Consult   Education Provided Yes   Patient Position at End of Consult Bedside chair;All needs within reach;Bed/Chair alarm activated   Nurse Communication Nurse aware of consult   Landy Conrad  OT

## 2025-03-07 NOTE — PLAN OF CARE
Problem: OCCUPATIONAL THERAPY ADULT  Goal: Performs self-care activities at highest level of function for planned discharge setting.  See evaluation for individualized goals.  Description: Treatment Interventions: ADL retraining, Functional transfer training, Endurance training, Patient/family training, Equipment evaluation/education, Compensatory technique education, Continued evaluation, Energy conservation, Activityengagement          See flowsheet documentation for full assessment, interventions and recommendations.   Outcome: Adequate for Discharge  Note: Limitation: Decreased ADL status, Decreased endurance, Decreased self-care trans, Decreased high-level ADLs  Prognosis: Good  Assessment: Pt seen for OT treatment session focusing on ADLs/IADLs, functional mobility, functional standing tolerance, functional transfers, patient education, continued evaluation. Pt greeted OOB in recliner at start of session. Pt alert and cooperative throughout session. Pt with good sitting balance and fair - dynamic standing balance. Pt tolerated treatment well. Pt completed don of underwear seated in chair, then standing to pull over waist. Pt then completed functional mobility from chair to bathroom with S and use of RW. Pt completed toilet transfer onto [facility] [place] standard toilet with S. Pt completed toileting with S, perineal hygiene in seated. Pt completed grooming at sink with S and use of RW for stability. Pt completed functional mobility back to chair with S and use of RW. Pt completed don of pants with S seated then standing to pull over waist with RW for stability as needed. Pt completed don of sweatshirt with S in standing. Pt educated on all ADLs/IADls, transfers, and LE management for independence and safety at home. Pt reports 4/10 pain and no dizziness. Pt's vitals include: stable.     Pt ended session seated OOB in recliner. Call bell and phone within reach. All needs met and pt reports no further questions at  this time. Continue to recommend No post-acute rehabilitation needs when medically cleared. OT will continue to follow pt on caseload.     Rehab Resource Intensity Level, OT: No post-acute rehabilitation needs

## 2025-03-07 NOTE — PHYSICAL THERAPY NOTE
PHYSICAL THERAPY NOTE          Patient Name: Kaya Connor  Today's Date: 3/7/2025  Flowsheet time entry error:  10:48-11:11       03/07/25 1050   PT Last Visit   PT Visit Date 03/07/25   Note Type   Note Type Treatment   Pain Assessment   Pain Score 4   Pain Location/Orientation Orientation: Right;Location: Hip   Hospital Pain Intervention(s) Cold applied;Repositioned;Ambulation/increased activity;Emotional support   Restrictions/Precautions   RLE Weight Bearing Per Order WBAT   Other Precautions Chair Alarm;Bed Alarm;Fall Risk;Pain;Multiple lines  (Masimo)   General   Chart Reviewed Yes   Response to Previous Treatment Patient with no complaints from previous session.   Family/Caregiver Present Yes   Cognition   Overall Cognitive Status WFL   Arousal/Participation Alert   Orientation Level Oriented X4   Comments Pleasant and cooperative.   Subjective   Subjective Agreeable to PT.  Doing well.   Bed Mobility   Additional Comments receied sitting OOB in recliner chair.   Transfers   Sit to Stand 5  Supervision   Additional items Increased time required;Verbal cues;Armrests   Stand to Sit 5  Supervision   Additional items Increased time required;Armrests;Verbal cues   Ambulation/Elevation   Gait pattern Improper Weight shift;Decreased foot clearance;Antalgic;Step through pattern   Gait Assistance 5  Supervision   Additional items Verbal cues   Assistive Device Rolling walker   Distance Amb with 'x2 with stair navigation in between distances.   Stair Management Assistance 5  Supervision   Additional items Verbal cues   Stair Management Technique One rail R;With cane  (rail + cane)   Number of Stairs 5  (performed 5 steps x 2.)   Ambulation/Elevation Additional Comments Progressed to step through pattern.   Balance   Static Sitting Good   Dynamic Sitting Fair   Static Standing Fair   Dynamic Standing Fair -   Ambulatory Fair -    Endurance Deficit   Endurance Deficit Yes   Endurance Deficit Description pain   Activity Tolerance   Activity Tolerance Patient tolerated treatment well;Patient limited by pain   Medical Staff Made Aware NurseKristan   Nurse Made Aware yes   Exercises   THR   (issued and reviewed THR handouts and HEP. Ed on progression of reps and frequency.)   Assessment   Prognosis Good   Problem List Decreased strength;Decreased range of motion;Decreased endurance;Impaired balance;Decreased mobility;Orthopedic restrictions;Pain   Assessment Ольга is seen for progression of PT POC to facilitate discharge to home. Improved with all aspects of mobility. S for transfers and ambulation with RW support. Able to increase distances and demonstrate step through pattern.  Negotiates stairs with step to pattern using cane + rail to simulate home set up.  Tolerated mobility training well.  Reviewed education handouts, HEP with progression in reps and frequency of program.  At this time is cleared from PT perspective for d/c to home with family support and OPPT.  The patient's AM-PAC Basic Mobility Inpatient Short Form Raw Score is 23. A Raw score of greater than 16 suggests the patient may benefit from discharge to home. Please also refer to the recommendation of the Physical Therapist for safe discharge planning. OPPT at d/c.   Barriers to Discharge Inaccessible home environment   Barriers to Discharge Comments stairs   Goals   Patient Goals get better   STG Expiration Date 03/13/25   PT Treatment Day 1   Plan   Treatment/Interventions Functional transfer training;LE strengthening/ROM;Therapeutic exercise;Elevations;Endurance training;Patient/family training;Equipment eval/education;Bed mobility;Gait training;Compensatory technique education;Continued evaluation;Spoke to nursing;Family   Progress Improving as expected   PT Frequency 2-3x/wk   Discharge Recommendation   Rehab Resource Intensity Level, PT III (Minimum Resource  Intensity)  (OPPT)   Equipment Recommended Walker  (pt has)   AM-PAC Basic Mobility Inpatient   Turning in Flat Bed Without Bedrails 4   Lying on Back to Sitting on Edge of Flat Bed Without Bedrails 4   Moving Bed to Chair 4   Standing Up From Chair Using Arms 4   Walk in Room 4   Climb 3-5 Stairs With Railing 3   Basic Mobility Inpatient Raw Score 23   Basic Mobility Standardized Score 50.88   [facility] Highest Level Of Mobility   -HLM Goal 7: Walk 25 feet or more   -HLM Achieved 7: Walk 25 feet or more   Education   Education Provided Mobility training;Home exercise program;Assistive device;Precautions for total hip arthroplasty (KENDRICK)   Patient Demonstrates acceptance/verbal understanding   End of Consult   Patient Position at End of Consult Bedside chair;Bed/Chair alarm activated;All needs within reach   End of Consult Comments ice to hip   Stephanie Cleveland, PT

## 2025-03-07 NOTE — PROGRESS NOTES
"Progress Note - Orthopedics   Name: Kaya Connor 69 y.o. female I MRN: [id number]  Unit/Bed#: WE 2 N -01 I Date of Admission: 3/6/2025   Date of Service: 3/7/2025 I Hospital Day: 0     Assessment & Plan  Primary osteoarthritis of right hip  POD 1 s/p right anterior KENDRICK with Dr. Sotelo 3/6/2025  -WBAT RLE  -No formal hip precautions, no extremes of motion.  -DVT ppx:  ASA 81mg BID x 4 weeks  -PT/OT  -Pain control  -H 11.6 this AM, VSS, monitor  -Discharge planning, home today.      Subjective   69 y.o.female seen and examined at the bedside.  She states overall she is doing well this morning.  Has anterior thigh muscle soreness, but otherwise no acute complaints.  Denies any RLE paresthesias.    No acute events overnight.    Objective :  Temp:  [97.5 °F (36.4 °C)-98.2 °F (36.8 °C)] 98.2 °F (36.8 °C)  HR:  [64-96] 72  BP: ()/(52-84) 121/80  Resp:  [16-20] 18  SpO2:  [94 %-98 %] 94 %  O2 Device: None (Room air)    Physical Exam  Musculoskeletal: Right lower extremity  Skin incision c/d/i . No erythema or ecchymosis.  Mild lucinda-incisional tenderness, no fluctuance, no abnormal warmth  Dressing:  Prineo  Motor intact to +FHL/EHL, +ankle dorsi/plantar flexion  SILT L2-S1 nerve root distribution  Toes warm, sensate, mobile.    No calf tenderness, no pitting edema.      Lab Results: I have reviewed the following results:  Recent Labs     03/07/25  0551   WBC 13.31*   HGB 11.6   HCT 33.2*      BUN 16   CREATININE 0.77     Blood Culture:  No results found for: \"BLOODCX\"  Wound Culture: No results found for: \"WOUNDCULT\"    "

## 2025-03-10 ENCOUNTER — TELEPHONE (OUTPATIENT)
Dept: OBGYN CLINIC | Facility: HOSPITAL | Age: 70
End: 2025-03-10

## 2025-03-12 NOTE — TELEPHONE ENCOUNTER
Patient contacted for a postoperative follow up assessment. Patient states current pain level of a 4/10 when sitting and 5/10 when walking with RW and cane.  Patient denies increase in swelling and dressing is Dressing C/D/I Patient isicing the site regularly. NN educated patient on post-op bruising, swelling, and icing. PT 3/17 at 11:30AM.     We reviewed patients AVS medication list. Patient is taking Oxycodone 5mg PRN, ASA 81mg BID, Celebrex 200mg BID, Senokot once daily, and Tylenol 1000mg BID, Cefadroxil 500mg BID(completed)..Patient has not had a BM but is passing gas.  NN instructed patient to add in daily Miralax to have a BM. Advised patient to call back within 48 hours if she does not have a BM.     Patient denies nausea, vomiting, abdominal pain, chest pain, shortness of breath, fever, dizziness, and calf pain. Patient confirmed post-op appointment with surgeon on  3/24 at 11:15AM.Patient does not have any other questions or concerns at this time. Pt was encouraged to call with any questions, concerns or issues.  .

## 2025-03-17 ENCOUNTER — OFFICE VISIT (OUTPATIENT)
Dept: PHYSICAL THERAPY | Facility: MEDICAL CENTER | Age: 70
End: 2025-03-17
Payer: MEDICARE

## 2025-03-17 DIAGNOSIS — Z96.641 STATUS POST TOTAL REPLACEMENT OF RIGHT HIP: Primary | ICD-10-CM

## 2025-03-17 DIAGNOSIS — M16.11 PRIMARY OSTEOARTHRITIS OF RIGHT HIP: ICD-10-CM

## 2025-03-17 PROCEDURE — 97140 MANUAL THERAPY 1/> REGIONS: CPT | Performed by: PHYSICAL THERAPIST

## 2025-03-17 PROCEDURE — 97110 THERAPEUTIC EXERCISES: CPT | Performed by: PHYSICAL THERAPIST

## 2025-03-17 PROCEDURE — 97161 PT EVAL LOW COMPLEX 20 MIN: CPT | Performed by: PHYSICAL THERAPIST

## 2025-03-17 NOTE — PROGRESS NOTES
PT Evaluation     Today's date: 3/17/2025  Patient name: Kaya Connor  : 1955  MRN: [id number]  Referring provider: Surekha Curtis PA-C  Dx:   Encounter Diagnoses   Name Primary?    Status post total replacement of right hip Yes    Primary osteoarthritis of right hip                   Assessment  Impairments: abnormal coordination, abnormal gait, abnormal or restricted ROM, activity intolerance, impaired balance, impaired physical strength, lacks appropriate home exercise program, pain with function, weight-bearing intolerance and poor body mechanics    Assessment details: Kaya Connor is a 69 y.o. female presents s/p R KENDRICK c/ anterior approach performed by Dr. Sotelo on 3/6/25.    Kaya arrived ambulating c/ SPC. Current weight bearing status: weight bearing as tolerated with AD prn.    Precautions, surgeon's protocol, and signs/symptoms of infection were reviewed with Kaya, who expressed verbal understanding.  Kaya Connor has the above listed impairments and will benefit from skilled PT to improve deficits to return to prior level of function.             Prognosis details: Prognosis given patient compliance to HEP and POC.     Goals  Impairment Goals  - Pt I with initial HEP in 1-2 visits  - Improve ROM equal to contralateral side in 6 weeks  - Increase strength to at least 3+/5 in all affected areas in 4-6 weeks    Functional Goals  - Increase Functional Status Measure to: goal status in 6-8 weeks  - Patient will be independent with comprehensive HEP in 6-8 weeks  - Ambulation is improved to prior level of function in 6-8 weeks  - Stair climbing is improved to prior level of function in 6-8 weeks  - Squatting is improved to prior level of function in 6-8 weeks       Plan  Patient would benefit from: PT eval  Planned modality interventions: thermotherapy: hydrocollator packs    Planned therapy interventions: joint mobilization, manual therapy, neuromuscular re-education, strengthening,  stretching, therapeutic activities, therapeutic exercise, graded exercise, graded activity, flexibility, home exercise program, body mechanics training, balance/weight bearing training and abdominal trunk stabilization    Frequency: 2x week  Therapy duration (weeks): 8-12 weeks.  Treatment plan discussed with: patient    Subjective Evaluation    History of Present Illness  Mechanism of injury: Patient is s/p R KENDRICK performed by Dr. Sotelo on 3/6/25.  Patient is doing well at this time.  She denies R LE paresthesias.  She is taking pain medication as prescribed.  Patient has a hx of chronic hip and back pain.  She is walking with a SPC.  She enjoys staying active, traveling, and walking.  Patient is looking forward to being able to do these activities post surgery.  Patient Goals  Patient goals for therapy: decreased pain, improved balance, increased motion, increased strength, independence with ADLs/IADLs and return to sport/leisure activities    Pain  Current pain ratin  At best pain ratin  At worst pain ratin  Relieving factors: medications and ice    Treatments  Current treatment: physical therapy    Objective     Observations     Additional Observation Details  Incision appears clean and dry without S/S of infection.    +2 DP/PT pulses.  No calf tenderness.    Neurological Testing     Sensation     Hip   Left Hip   Intact: light touch    Right Hip   Intact: light touch    Additional Neurological Details  Reflexes NT.     Active Range of Motion   Left Knee   Normal active range of motion    Right Knee   Normal active range of motion    Passive Range of Motion   Left Hip   Normal passive range of motion    Right Hip   Flexion: 70 degrees   Abduction: 30 degrees     Strength/Myotome Testing     Left Hip   Planes of Motion   Flexion: 5    Right Hip   Planes of Motion   Flexion: 3-    Tests     Additional Tests Details  No pertinent testing d/t post-op pd.    Ambulation     Ambulation: Level Surfaces    Ambulation with assistive device: independent    Observational Gait   Gait: antalgic and asymmetric   Decreased walking speed and right stance time.     Functional Assessment      Squat    Pain and sitting toward left side.     Single Leg Stance   Left single leg stance time: WNL.  Right single leg stance time: unsteady.    Posterior weight shift: moderate       Precautions: WBAT    Manuals 3/17            PROM AZ                                                   Neuro Re-Ed             TA isometrics 2x10 5s                                                   Ther Ex             Bike             Heel slides 3x10            Sup hip abd 3x10                         Glute sets 3x10 5s            Quad sets 3x10 5s            Sup hip add 2x10 5s            Sup hip abd 2x10 5s                         Ther Activity                                       Gait Training                                       Modalities              10'                                 Liz Noland, PT  3/17/2025,12:31 PM

## 2025-03-20 ENCOUNTER — OFFICE VISIT (OUTPATIENT)
Dept: PHYSICAL THERAPY | Facility: MEDICAL CENTER | Age: 70
End: 2025-03-20
Payer: MEDICARE

## 2025-03-20 DIAGNOSIS — M16.11 PRIMARY OSTEOARTHRITIS OF RIGHT HIP: ICD-10-CM

## 2025-03-20 DIAGNOSIS — Z96.641 STATUS POST TOTAL REPLACEMENT OF RIGHT HIP: Primary | ICD-10-CM

## 2025-03-20 PROCEDURE — 97140 MANUAL THERAPY 1/> REGIONS: CPT | Performed by: PHYSICAL THERAPIST

## 2025-03-20 PROCEDURE — 97112 NEUROMUSCULAR REEDUCATION: CPT | Performed by: PHYSICAL THERAPIST

## 2025-03-20 PROCEDURE — 97110 THERAPEUTIC EXERCISES: CPT | Performed by: PHYSICAL THERAPIST

## 2025-03-20 NOTE — PROGRESS NOTES
Daily Note     Today's date: 3/20/2025  Patient name: Kaya Connor  : 1955  MRN: [id number]  Referring provider: Surekha Curtis PA-C  Dx:   Encounter Diagnosis     ICD-10-CM    1. Status post total replacement of right hip  Z96.641       2. Primary osteoarthritis of right hip  M16.11                      Subjective: Patient states that she is doing well.       Objective: See treatment diary below.      Assessment:  Patient demonstrates improved ROM and good tolerance to progressions.  Tolerated treatment well. Patient would benefit from continued PT.      Plan: Continue per plan of care.      Precautions: WBAT    Manuals 3/17 3/20           PROM AZ AZ                                                  Neuro Re-Ed             TA isometrics 2x10 5s 3x10 5s           Marches  2x10                                     Ther Ex             Bike  5'           Heel slides 3x10 3x10           Sup hip abd 3x10 3x10                        Glute sets 3x10 5s 3x10 5s           Quad sets 3x10 5s 3x10 5s           Sup hip add 2x10 5s 3x10 5s           Sup hip abd 2x10 5s 3x10 5s green           Bridges  2x10           Heel raises  2x10           Standing hip abd  2x10                        Ther Activity                                       Gait Training                                       Modalities              10' 10'

## 2025-03-21 DIAGNOSIS — M16.11 PRIMARY OSTEOARTHRITIS OF RIGHT HIP: ICD-10-CM

## 2025-03-23 RX ORDER — ACETAMINOPHEN 500 MG
1000 TABLET ORAL EVERY 8 HOURS
Qty: 60 TABLET | Refills: 0 | Status: SHIPPED | OUTPATIENT
Start: 2025-03-23

## 2025-03-24 ENCOUNTER — OFFICE VISIT (OUTPATIENT)
Dept: PHYSICAL THERAPY | Facility: MEDICAL CENTER | Age: 70
End: 2025-03-24
Payer: MEDICARE

## 2025-03-24 ENCOUNTER — APPOINTMENT (OUTPATIENT)
Age: 70
End: 2025-03-24
Payer: MEDICARE

## 2025-03-24 ENCOUNTER — OFFICE VISIT (OUTPATIENT)
Age: 70
End: 2025-03-24

## 2025-03-24 VITALS — BODY MASS INDEX: 28.17 KG/M2 | HEIGHT: 63 IN | WEIGHT: 159 LBS

## 2025-03-24 DIAGNOSIS — Z47.1 AFTERCARE FOLLOWING RIGHT HIP JOINT REPLACEMENT SURGERY: ICD-10-CM

## 2025-03-24 DIAGNOSIS — Z96.641 STATUS POST TOTAL REPLACEMENT OF RIGHT HIP: Primary | ICD-10-CM

## 2025-03-24 DIAGNOSIS — Z96.641 AFTERCARE FOLLOWING RIGHT HIP JOINT REPLACEMENT SURGERY: ICD-10-CM

## 2025-03-24 DIAGNOSIS — Z47.1 AFTERCARE FOLLOWING RIGHT HIP JOINT REPLACEMENT SURGERY: Primary | ICD-10-CM

## 2025-03-24 DIAGNOSIS — M16.11 PRIMARY OSTEOARTHRITIS OF RIGHT HIP: ICD-10-CM

## 2025-03-24 DIAGNOSIS — Z96.641 AFTERCARE FOLLOWING RIGHT HIP JOINT REPLACEMENT SURGERY: Primary | ICD-10-CM

## 2025-03-24 PROCEDURE — 73502 X-RAY EXAM HIP UNI 2-3 VIEWS: CPT

## 2025-03-24 PROCEDURE — 97112 NEUROMUSCULAR REEDUCATION: CPT | Performed by: PHYSICAL THERAPIST

## 2025-03-24 PROCEDURE — 97140 MANUAL THERAPY 1/> REGIONS: CPT | Performed by: PHYSICAL THERAPIST

## 2025-03-24 PROCEDURE — 99024 POSTOP FOLLOW-UP VISIT: CPT | Performed by: STUDENT IN AN ORGANIZED HEALTH CARE EDUCATION/TRAINING PROGRAM

## 2025-03-24 PROCEDURE — 97110 THERAPEUTIC EXERCISES: CPT | Performed by: PHYSICAL THERAPIST

## 2025-03-24 RX ORDER — PSEUDOEPHED/ACETAMINOPH/DIPHEN 30MG-500MG
TABLET ORAL
Qty: 60 TABLET | Refills: 0 | Status: SHIPPED | OUTPATIENT
Start: 2025-03-24

## 2025-03-24 NOTE — PROGRESS NOTES
"Daily Note     Today's date: 3/24/2025  Patient name: Kaya Connor  : 1955  MRN: [id number]  Referring provider: Surekha Curtis PA-C  Dx:   Encounter Diagnosis     ICD-10-CM    1. Status post total replacement of right hip  Z96.641       2. Primary osteoarthritis of right hip  M16.11                      Subjective: Patient states that she is doing well and follow up c/ physician went well.       Objective: See treatment diary below.      Assessment:  Patient presents ambulating without use of AD.  She demonstrates good tolerance to progressions without pain.  Tolerated treatment well. Patient would benefit from continued PT.      Plan: Continue per plan of care.      Precautions: WBAT    Manuals 3/17 3/20 3/24          PROM AZ AZ AZ                                                 Neuro Re-Ed             TA isometrics 2x10 5s 3x10 5s 3x12           Marches  2x10 3x10                                    Ther Ex             Bike  5' 5'          Heel slides 3x10 3x10 3x10          Sup hip abd 3x10 3x10 3x10                        Glute sets 3x10 5s 3x10 5s 3x10 5s          Quad sets 3x10 5s 3x10 5s 3x10 5s          Sup hip add 2x10 5s 3x10 5s 3x12 5s           Sup hip abd 2x10 5s 3x10 5s green 3x10 5s blue          Bridges  2x10 3x10          Heel raises  2x10 3x10          Standing hip abd  2x10 3x10          SL balance   3x to failure SL floor                        Ther Activity                                       Gait Training   X s/ AD          Stair negotiation   Up R/ down R 2x10 4\"          Lateral step ups   2x10 4\"          Modalities             MH 10' 10' 10'                              "

## 2025-03-24 NOTE — ASSESSMENT & PLAN NOTE
2.5 weeks s/p right anterior total hip arthroplasty performed on 03/06/2025    - continue multi-modal pain control   - Weight bearing status: as tolerated  - DVT ppx: aspirin 81 mg BID for an additional 1.5 weeks (4 weeks total)  - Incision care: avoid scrubbing or submerging  - Continue PT/OT exercises  - F/U in 4 weeks     Orders:    XR hip/pelv 2-3 vws right if performed; Future

## 2025-03-24 NOTE — PROGRESS NOTES
Subjective: 69 y.o. female presents to the office 2.5 weeks s/p right anterior total hip arthroplasty performed on 03/06/2025. She has been doing well overall. She experiences some soreness down her anterior thigh to her knee. Pain controlled. Progressing well, ambulating independently. Incision without drainage. Denies fevers or chills    Physical Exam:  Incision: clean, dry, and intact. Dressing removed. Mild erythema present surrounding incision.   ROM: as expected without pain  5/5 IP/Q/HS/TA/GS, 2+ DP/PT, SILT DP/SP/S/S/TN    XR right hip: s/p press-fit total hip arthroplasty, components in good position. No fracture or dislocation   Assessment & Plan  Aftercare following right hip joint replacement surgery  2.5 weeks s/p right anterior total hip arthroplasty performed on 03/06/2025    - continue multi-modal pain control   - Weight bearing status: as tolerated  - DVT ppx: aspirin 81 mg BID for an additional 1.5 weeks (4 weeks total)  - Incision care: avoid scrubbing or submerging  - Continue PT/OT exercises  - F/U in 4 weeks     Orders:    XR hip/pelv 2-3 vws right if performed; Future        Scribe Attestation      I,:  Patito Laguerre am acting as a scribe while in the presence of the attending physician.:       I,:  Oleg Sotelo,  personally performed the services described in this documentation    as scribed in my presence.:

## 2025-03-27 ENCOUNTER — OFFICE VISIT (OUTPATIENT)
Dept: PHYSICAL THERAPY | Facility: MEDICAL CENTER | Age: 70
End: 2025-03-27
Payer: MEDICARE

## 2025-03-27 DIAGNOSIS — M16.11 PRIMARY OSTEOARTHRITIS OF RIGHT HIP: ICD-10-CM

## 2025-03-27 DIAGNOSIS — Z96.641 STATUS POST TOTAL REPLACEMENT OF RIGHT HIP: Primary | ICD-10-CM

## 2025-03-27 PROCEDURE — 97110 THERAPEUTIC EXERCISES: CPT | Performed by: PHYSICAL THERAPIST

## 2025-03-27 PROCEDURE — 97140 MANUAL THERAPY 1/> REGIONS: CPT | Performed by: PHYSICAL THERAPIST

## 2025-03-27 PROCEDURE — 97112 NEUROMUSCULAR REEDUCATION: CPT | Performed by: PHYSICAL THERAPIST

## 2025-03-27 NOTE — PROGRESS NOTES
"Daily Note     Today's date: 3/27/2025  Patient name: Kaya Connor  : 1955  MRN: [id number]  Referring provider: Surekha Curtis PA-C  Dx:   Encounter Diagnosis     ICD-10-CM    1. Status post total replacement of right hip  Z96.641       2. Primary osteoarthritis of right hip  M16.11                      Subjective: Patient states that she is doing well, but after doing a lot yesterday, she started to feel anterior thigh pain.      Objective: See treatment diary below.      Assessment:  Patient presents c/ tightness t/o right hip flexor and quad mm.  She demonstrates good tolerance to progressions.  Tolerated treatment well. Patient would benefit from continued PT.      Plan: Continue per plan of care.      Precautions: WBAT    Manuals 3/17 3/20 3/24 3/27         PROM AZ AZ AZ AZ         Hip flexor str    AZ         Quad str    AZ                      Neuro Re-Ed             TA isometrics 2x10 5s 3x10 5s 3x12  3x12 5s         Marches  2x10 3x10 3x10                                   Ther Ex             Bike  5' 5' 10'         Heel slides 3x10 3x10 3x10 3x10         Sup hip abd 3x10 3x10 3x10  3x10                      Glute sets 3x10 5s 3x10 5s 3x10 5s 3x10 5s         Quad sets 3x10 5s 3x10 5s 3x10 5s 3x10 5s         Sup hip add 2x10 5s 3x10 5s 3x12 5s  3x12 5s         Sup hip abd 2x10 5s 3x10 5s green 3x10 5s blue 3x12 5s blue         Bridges  2x10 3x10 3x12         Heel raises  2x10 3x10 3x10         Standing hip abd  2x10 3x10 3x10         SL balance   3x to failure SL floor  5x to failure SL floor                      Ther Activity                                       Gait Training   X s/ AD X s/ AD         Stair negotiation   Up R/ down R 2x10 4\" Up R/ down R 2x10 4\"         Lateral step ups   2x10 4\" 2x10 4\"         Modalities             MH 10' 10' 10' 10'                               "

## 2025-03-31 ENCOUNTER — APPOINTMENT (OUTPATIENT)
Dept: PHYSICAL THERAPY | Facility: MEDICAL CENTER | Age: 70
End: 2025-03-31
Payer: MEDICARE

## 2025-04-01 ENCOUNTER — OFFICE VISIT (OUTPATIENT)
Dept: PHYSICAL THERAPY | Facility: MEDICAL CENTER | Age: 70
End: 2025-04-01
Payer: MEDICARE

## 2025-04-01 DIAGNOSIS — Z96.641 STATUS POST TOTAL REPLACEMENT OF RIGHT HIP: Primary | ICD-10-CM

## 2025-04-01 DIAGNOSIS — M16.11 PRIMARY OSTEOARTHRITIS OF RIGHT HIP: ICD-10-CM

## 2025-04-01 PROCEDURE — 97112 NEUROMUSCULAR REEDUCATION: CPT | Performed by: PHYSICAL THERAPIST

## 2025-04-01 PROCEDURE — 97140 MANUAL THERAPY 1/> REGIONS: CPT | Performed by: PHYSICAL THERAPIST

## 2025-04-01 PROCEDURE — 97110 THERAPEUTIC EXERCISES: CPT | Performed by: PHYSICAL THERAPIST

## 2025-04-01 NOTE — PROGRESS NOTES
"Daily Note     Today's date: 2025  Patient name: Kaya Connor  : 1955  MRN: [id number]  Referring provider: Surekha Curtis PA-C  Dx:   Encounter Diagnosis     ICD-10-CM    1. Status post total replacement of right hip  Z96.641       2. Primary osteoarthritis of right hip  M16.11                      Subjective:  Patient states that she is doing well.  She noted some radiating anterior thigh pain after doing a longer walk last night.      Objective: See treatment diary below.      Assessment:  Patient demonstrates good tolerance to progressions.  She demonstrates lumbar hypomobility on the left at L4-S1 c/ UPAs.  Tolerated treatment well. Patient would benefit from continued PT.      Plan: Continue per plan of care.      Precautions: WBAT    Manuals 3/17 3/20 3/24 3/27 4/1        PROM AZ AZ AZ AZ AZ        Hip flexor str    AZ         Quad str    AZ AZ        Iliopsoas release     AZ        UPAs L4-S1       AZ        Neuro Re-Ed             TA isometrics 2x10 5s 3x10 5s 3x12  3x12 5s 3x15 5s        Marches  2x10 3x10 3x10 3x12                                  Ther Ex             Bike  5' 5' 10' 10'        Heel slides 3x10 3x10 3x10 3x10 3x10        Sup hip abd 3x10 3x10 3x10  3x10 3x10                     Glute sets 3x10 5s 3x10 5s 3x10 5s 3x10 5s 3x10 5s        Quad sets 3x10 5s 3x10 5s 3x10 5s 3x10 5s 3x10 5s        Sup hip add 2x10 5s 3x10 5s 3x12 5s  3x12 5s 3x15 5s        Sup hip abd 2x10 5s 3x10 5s green 3x10 5s blue 3x12 5s blue 3x15 5s blue        Bridges  2x10 3x10 3x12 3x15        Heel raises  2x10 3x10 3x10 3x10        Standing hip abd  2x10 3x10 3x10 3x10        SL balance   3x to failure SL floor  5x to failure SL floor 5x to failure SL floor                     Ther Activity                                       Gait Training   X s/ AD X s/ AD         Stair negotiation   Up R/ down R 2x10 4\" Up R/ down R 2x10 4\" Up R/ down R 3x10 6\"        Lateral step ups   2x10 4\" 2x10 4\" 3x10 6\"      "   Modalities              10' 10' 10' 10' 10'

## 2025-04-02 DIAGNOSIS — M16.11 PRIMARY OSTEOARTHRITIS OF RIGHT HIP: ICD-10-CM

## 2025-04-02 RX ORDER — CELECOXIB 200 MG/1
200 CAPSULE ORAL 2 TIMES DAILY
Qty: 60 CAPSULE | Refills: 0 | OUTPATIENT
Start: 2025-04-02

## 2025-04-03 ENCOUNTER — OFFICE VISIT (OUTPATIENT)
Dept: PHYSICAL THERAPY | Facility: MEDICAL CENTER | Age: 70
End: 2025-04-03
Payer: MEDICARE

## 2025-04-03 DIAGNOSIS — Z96.641 STATUS POST TOTAL REPLACEMENT OF RIGHT HIP: Primary | ICD-10-CM

## 2025-04-03 DIAGNOSIS — M16.11 PRIMARY OSTEOARTHRITIS OF RIGHT HIP: ICD-10-CM

## 2025-04-03 PROCEDURE — 97140 MANUAL THERAPY 1/> REGIONS: CPT | Performed by: PHYSICAL THERAPIST

## 2025-04-03 PROCEDURE — 97112 NEUROMUSCULAR REEDUCATION: CPT | Performed by: PHYSICAL THERAPIST

## 2025-04-03 PROCEDURE — 97110 THERAPEUTIC EXERCISES: CPT | Performed by: PHYSICAL THERAPIST

## 2025-04-03 RX ORDER — CELECOXIB 200 MG/1
200 CAPSULE ORAL 2 TIMES DAILY
Qty: 60 CAPSULE | Refills: 0 | Status: SHIPPED | OUTPATIENT
Start: 2025-04-03

## 2025-04-03 NOTE — PROGRESS NOTES
Daily Note     Today's date: 4/3/2025  Patient name: Kaya Connor  : 1955  MRN: [id number]  Referring provider: Surekha Curtis PA-C  Dx:   Encounter Diagnosis     ICD-10-CM    1. Status post total replacement of right hip  Z96.641       2. Primary osteoarthritis of right hip  M16.11                      Subjective: Patient states that she is doing well, but continues c/ anterior thigh pain at night.      Objective: See treatment diary below.      Assessment:  Patient demonstrates good tolerance to tx c/ increased ROM and strength in the R hip.  She demonstrates lumbar hypomobility on the left at L4-S1 and R iliopsoas tightness.  Discussed HEP throughout the weekend.  Tolerated treatment well. Patient would benefit from continued PT.      Plan: Continue per plan of care.      Precautions: WBAT    Manuals 3/17 3/20 3/24 3/27 4/1 4/3       PROM AZ AZ AZ AZ AZ AZ       Hip flexor str    AZ         Quad str    AZ AZ AZ       Iliopsoas release     AZ AZ       UPAs L4-S1       AZ AZ       Neuro Re-Ed             TA isometrics 2x10 5s 3x10 5s 3x12  3x12 5s 3x15 5s 3x15 5s       Marches  2x10 3x10 3x10 3x12 3x12                                 Ther Ex             Bike  5' 5' 10' 10' 10'       Heel slides 3x10 3x10 3x10 3x10 3x10 3x10       Sup hip abd 3x10 3x10 3x10  3x10 3x10 3x10                    Glute sets 3x10 5s 3x10 5s 3x10 5s 3x10 5s 3x10 5s 3x10 5s       Quad sets 3x10 5s 3x10 5s 3x10 5s 3x10 5s 3x10 5s 3x10 5s       Sup hip add 2x10 5s 3x10 5s 3x12 5s  3x12 5s 3x15 5s 3x15 5s       Sup hip abd 2x10 5s 3x10 5s green 3x10 5s blue 3x12 5s blue 3x15 5s blue 3x15 5s blue       Bridges  2x10 3x10 3x12 3x15 3x15       Heel raises  2x10 3x10 3x10 3x10 3x10       Standing hip abd  2x10 3x10 3x10 3x10 3x10       SL balance   3x to failure SL floor  5x to failure SL floor 5x to failure SL floor 5x to failure SL floor                    Ther Activity                                       Gait Training   X s/ AD X s/  "AD         Stair negotiation   Up R/ down R 2x10 4\" Up R/ down R 2x10 4\" Up R/ down R 3x10 6\"        Lateral step ups   2x10 4\" 2x10 4\" 3x10 6\"        [facility] 10' 10' 10' 10' 10' 10'                                 "

## 2025-04-07 ENCOUNTER — APPOINTMENT (OUTPATIENT)
Dept: PHYSICAL THERAPY | Facility: MEDICAL CENTER | Age: 70
End: 2025-04-07
Payer: MEDICARE

## 2025-04-10 ENCOUNTER — OFFICE VISIT (OUTPATIENT)
Dept: PHYSICAL THERAPY | Facility: MEDICAL CENTER | Age: 70
End: 2025-04-10
Payer: MEDICARE

## 2025-04-10 DIAGNOSIS — M16.11 PRIMARY OSTEOARTHRITIS OF RIGHT HIP: ICD-10-CM

## 2025-04-10 DIAGNOSIS — Z96.641 STATUS POST TOTAL REPLACEMENT OF RIGHT HIP: Primary | ICD-10-CM

## 2025-04-10 PROCEDURE — 97140 MANUAL THERAPY 1/> REGIONS: CPT | Performed by: PHYSICAL THERAPIST

## 2025-04-10 PROCEDURE — 97110 THERAPEUTIC EXERCISES: CPT | Performed by: PHYSICAL THERAPIST

## 2025-04-10 PROCEDURE — 97112 NEUROMUSCULAR REEDUCATION: CPT | Performed by: PHYSICAL THERAPIST

## 2025-04-10 NOTE — PROGRESS NOTES
Daily Note     Today's date: 4/10/2025  Patient name: Kaya Connor  : 1955  MRN: [id number]  Referring provider: Surekha Curtis PA-C  Dx:   Encounter Diagnosis     ICD-10-CM    1. Status post total replacement of right hip  Z96.641       2. Primary osteoarthritis of right hip  M16.11                      Subjective: Patient states that she is feeling much better.      Objective: See treatment diary below.      Assessment:  Patient demonstrates good tolerance to progressions and is without radicular anterior thigh pain.  Patient tolerated tx well and will continue to benefit from skilled PT.      Plan: Continue per plan of care.      Precautions: WBAT    Manuals 3/17 3/20 3/24 3/27 4/1 4/3 4/10      PROM AZ AZ AZ AZ AZ AZ AZ      Hip flexor str    AZ         Quad str    AZ AZ AZ AZ      Iliopsoas release     AZ AZ AZ      UPAs L4-S1       AZ AZ AZ      Neuro Re-Ed             TA isometrics 2x10 5s 3x10 5s 3x12  3x12 5s 3x15 5s 3x15 5s 3x15 5s      Marches  2x10 3x10 3x10 3x12 3x12 3x20                                Ther Ex             Bike  5' 5' 10' 10' 10' 10'      Heel slides 3x10 3x10 3x10 3x10 3x10 3x10 30x      Sup hip abd 3x10 3x10 3x10  3x10 3x10 3x10 30x                   Glute sets 3x10 5s 3x10 5s 3x10 5s 3x10 5s 3x10 5s 3x10 5s 30x5s      Quad sets 3x10 5s 3x10 5s 3x10 5s 3x10 5s 3x10 5s 3x10 5s 30x5s      Sup hip add 2x10 5s 3x10 5s 3x12 5s  3x12 5s 3x15 5s 3x15 5s 3x15 5s      Sup hip abd 2x10 5s 3x10 5s green 3x10 5s blue 3x12 5s blue 3x15 5s blue 3x15 5s blue 3x15 5s blue      Bridges  2x10 3x10 3x12 3x15 3x15 3x15      Heel raises  2x10 3x10 3x10 3x10 3x10 3x12      Standing hip abd  2x10 3x10 3x10 3x10 3x10 3x12      SL balance   3x to failure SL floor  5x to failure SL floor 5x to failure SL floor 5x to failure SL floor 10x to failure SL floor      Standing hip flexor str       10x2s      Ther Activity                                       Gait Training   X s/ AD X s/ AD         Stair  "negotiation   Up R/ down R 2x10 4\" Up R/ down R 2x10 4\" Up R/ down R 3x10 6\"  Up R/ down R 3x10 6\"      Lateral step ups   2x10 4\" 2x10 4\" 3x10 6\"  3x10 6\"      [facility] 10' 10' 10' 10' 10' 10' 10'                                  "

## 2025-04-14 ENCOUNTER — OFFICE VISIT (OUTPATIENT)
Dept: PHYSICAL THERAPY | Facility: MEDICAL CENTER | Age: 70
End: 2025-04-14
Payer: MEDICARE

## 2025-04-14 DIAGNOSIS — Z96.641 STATUS POST TOTAL REPLACEMENT OF RIGHT HIP: Primary | ICD-10-CM

## 2025-04-14 DIAGNOSIS — M16.11 PRIMARY OSTEOARTHRITIS OF RIGHT HIP: ICD-10-CM

## 2025-04-14 PROCEDURE — 97110 THERAPEUTIC EXERCISES: CPT | Performed by: PHYSICAL THERAPIST

## 2025-04-14 PROCEDURE — 97112 NEUROMUSCULAR REEDUCATION: CPT | Performed by: PHYSICAL THERAPIST

## 2025-04-14 PROCEDURE — 97140 MANUAL THERAPY 1/> REGIONS: CPT | Performed by: PHYSICAL THERAPIST

## 2025-04-14 NOTE — PROGRESS NOTES
Daily Note     Today's date: 2025  Patient name: Kaya Connor  : 1955  MRN: [id number]  Referring provider: Surekha Curtis PA-C  Dx:   Encounter Diagnosis     ICD-10-CM    1. Status post total replacement of right hip  Z96.641       2. Primary osteoarthritis of right hip  M16.11                      Subjective: Patient states she is doing well.       Objective: See treatment diary below.      Assessment:  Patient demonstrates good tolerance to progressions.  Tolerated treatment well. Patient would benefit from continued PT.      Plan: Continue per plan of care.      Precautions: WBAT    Manuals 3/17 3/20 3/24 3/27 4/1 4/3 4/10 4/14     PROM AZ AZ AZ AZ AZ AZ AZ AZ     Hip flexor str    AZ         Quad str    AZ AZ AZ AZ AZ     Iliopsoas release     AZ AZ AZ AZ     UPAs L4-S1       AZ AZ AZ AZ     Neuro Re-Ed             TA isometrics 2x10 5s 3x10 5s 3x12  3x12 5s 3x15 5s 3x15 5s 3x15 5s 3x15 5s     Marches  2x10 3x10 3x10 3x12 3x12 3x20 3x20                               Ther Ex             Bike  5' 5' 10' 10' 10' 10' 10'     Heel slides 3x10 3x10 3x10 3x10 3x10 3x10 30x 30x     Sup hip abd 3x10 3x10 3x10  3x10 3x10 3x10 30x 30x                  Glute sets 3x10 5s 3x10 5s 3x10 5s 3x10 5s 3x10 5s 3x10 5s 30x5s 30x5s     Quad sets 3x10 5s 3x10 5s 3x10 5s 3x10 5s 3x10 5s 3x10 5s 30x5s 30x5s     Sup hip add 2x10 5s 3x10 5s 3x12 5s  3x12 5s 3x15 5s 3x15 5s 3x15 5s 3x15 5s     Sup hip abd 2x10 5s 3x10 5s green 3x10 5s blue 3x12 5s blue 3x15 5s blue 3x15 5s blue 3x15 5s blue 3x10 5s black     Bridges  2x10 3x10 3x12 3x15 3x15 3x15 3x15     Heel raises  2x10 3x10 3x10 3x10 3x10 3x12 3x15     Standing hip abd  2x10 3x10 3x10 3x10 3x10 3x12 3x15     SL balance   3x to failure SL floor  5x to failure SL floor 5x to failure SL floor 5x to failure SL floor 10x to failure SL floor 10x to failure SL floor     Standing hip flexor str       10x2s 10x2s     Ther Activity                                       Gait  "Training   X s/ AD X s/ AD         Stair negotiation   Up R/ down R 2x10 4\" Up R/ down R 2x10 4\" Up R/ down R 3x10 6\"  Up R/ down R 3x10 6\" UP R/ down R 3x10 8\"     Lateral step ups   2x10 4\" 2x10 4\" 3x10 6\"  3x10 6\" 3x10 8\"     [facility] 10' 10' 10' 10' 10' 10' 10' 10'                                   "

## 2025-04-17 ENCOUNTER — OFFICE VISIT (OUTPATIENT)
Dept: PHYSICAL THERAPY | Facility: MEDICAL CENTER | Age: 70
End: 2025-04-17
Payer: MEDICARE

## 2025-04-17 DIAGNOSIS — Z96.641 STATUS POST TOTAL REPLACEMENT OF RIGHT HIP: Primary | ICD-10-CM

## 2025-04-17 DIAGNOSIS — M16.11 PRIMARY OSTEOARTHRITIS OF RIGHT HIP: ICD-10-CM

## 2025-04-17 PROCEDURE — 97112 NEUROMUSCULAR REEDUCATION: CPT | Performed by: PHYSICAL THERAPIST

## 2025-04-17 PROCEDURE — 97110 THERAPEUTIC EXERCISES: CPT | Performed by: PHYSICAL THERAPIST

## 2025-04-17 PROCEDURE — 97140 MANUAL THERAPY 1/> REGIONS: CPT | Performed by: PHYSICAL THERAPIST

## 2025-04-17 NOTE — PROGRESS NOTES
Daily Note     Today's date: 2025  Patient name: Kaya Connor  : 1955  MRN: [id number]  Referring provider: Surekha Curtis PA-C  Dx:   Encounter Diagnosis     ICD-10-CM    1. Status post total replacement of right hip  Z96.641       2. Primary osteoarthritis of right hip  M16.11                      Subjective: Patient states she is doing well.      Objective: See treatment diary below.      Assessment:  Patient demonstrates good tolerance to tx progressions.  Tolerated treatment well. Patient would benefit from continued PT      Plan: Continue per plan of care.      Precautions: WBAT    Manuals 3/17 3/20 3/24 3/27 4/1 4/3 4/10 4/14 4/17    PROM AZ AZ AZ AZ AZ AZ AZ AZ AZ    Hip flexor str    AZ         Quad str    AZ AZ AZ AZ AZ AZ    Iliopsoas release     AZ AZ AZ AZ     UPAs L4-S1       AZ AZ AZ AZ AZ    STM/MFR          AZ    Neuro Re-Ed             TA isometrics 2x10 5s 3x10 5s 3x12  3x12 5s 3x15 5s 3x15 5s 3x15 5s 3x15 5s 3x15 5s    Marches  2x10 3x10 3x10 3x12 3x12 3x20 3x20 3x20                              Ther Ex             Bike  5' 5' 10' 10' 10' 10' 10' 10'    Heel slides 3x10 3x10 3x10 3x10 3x10 3x10 30x 30x 30x    Sup hip abd 3x10 3x10 3x10  3x10 3x10 3x10 30x 30x 30x                 Glute sets 3x10 5s 3x10 5s 3x10 5s 3x10 5s 3x10 5s 3x10 5s 30x5s 30x5s 30x5s    Quad sets 3x10 5s 3x10 5s 3x10 5s 3x10 5s 3x10 5s 3x10 5s 30x5s 30x5s 30x5s    Sup hip add 2x10 5s 3x10 5s 3x12 5s  3x12 5s 3x15 5s 3x15 5s 3x15 5s 3x15 5s 3x15 5s    Sup hip abd 2x10 5s 3x10 5s green 3x10 5s blue 3x12 5s blue 3x15 5s blue 3x15 5s blue 3x15 5s blue 3x10 5s black 3x10 5s black    Bridges  2x10 3x10 3x12 3x15 3x15 3x15 3x15 3x15    Heel raises  2x10 3x10 3x10 3x10 3x10 3x12 3x15 3x15    Standing hip abd  2x10 3x10 3x10 3x10 3x10 3x12 3x15 3x15    SL balance   3x to failure SL floor  5x to failure SL floor 5x to failure SL floor 5x to failure SL floor 10x to failure SL floor 10x to failure SL floor 5x to failure  "airex    Standing hip flexor str       10x2s 10x2s 10x2s    Ther Activity                                       Gait Training   X s/ AD X s/ AD         Stair negotiation   Up R/ down R 2x10 4\" Up R/ down R 2x10 4\" Up R/ down R 3x10 6\"  Up R/ down R 3x10 6\" UP R/ down R 3x10 8\" UP R/ down R 3x10 8\"    Lateral step ups   2x10 4\" 2x10 4\" 3x10 6\"  3x10 6\" 3x10 8\" 3x10 8\"    Modalities              10' 10' 10' 10' 10' 10' 10' 10' 10'                                    "

## 2025-04-21 ENCOUNTER — OFFICE VISIT (OUTPATIENT)
Dept: PHYSICAL THERAPY | Facility: MEDICAL CENTER | Age: 70
End: 2025-04-21
Payer: MEDICARE

## 2025-04-21 DIAGNOSIS — M16.11 PRIMARY OSTEOARTHRITIS OF RIGHT HIP: ICD-10-CM

## 2025-04-21 DIAGNOSIS — Z96.641 STATUS POST TOTAL REPLACEMENT OF RIGHT HIP: Primary | ICD-10-CM

## 2025-04-21 PROCEDURE — 97112 NEUROMUSCULAR REEDUCATION: CPT | Performed by: PHYSICAL THERAPIST

## 2025-04-21 PROCEDURE — 97110 THERAPEUTIC EXERCISES: CPT | Performed by: PHYSICAL THERAPIST

## 2025-04-21 PROCEDURE — 97140 MANUAL THERAPY 1/> REGIONS: CPT | Performed by: PHYSICAL THERAPIST

## 2025-04-21 NOTE — PROGRESS NOTES
Progress Note    Today's date: 2025  Patient name: Kaya Connor  : 1955  MRN: [id number]  Referring provider: Surekha Curtis PA-C  Dx:   Encounter Diagnosis     ICD-10-CM    1. Status post total replacement of right hip  Z96.641       2. Primary osteoarthritis of right hip  M16.11                      Subjective:  Patient states she is doing well.     Objective:  See tx diary below.    R hip PROM:  WNL    Assessment  Impairments: abnormal coordination, abnormal gait, abnormal or restricted ROM, activity intolerance, impaired balance, impaired physical strength, lacks appropriate home exercise program, pain with function, weight-bearing intolerance and poor body mechanics    Assessment details: Kaya Connor is a 69 y.o. female presents s/p R KENDRICK c/ anterior approach performed by Dr. Sotelo on 3/6/25.  Kaya Connor has been compliant in attending physical therapy and will continue to benefit from skilled PT to improve deficits to return to prior level of function.      Prognosis details: Prognosis given patient compliance to HEP and POC.     Goals  Impairment Goals  - Pt I with initial HEP in 1-2 visits- achieved  - Improve ROM equal to contralateral side in 6 weeks- in progress  - Increase strength to at least 3+/5 in all affected areas in 4-6 weeks- achieved    Functional Goals  - Increase Functional Status Measure to: goal status in 6-8 weeks- in progress  - Patient will be independent with comprehensive HEP in 6-8 weeks- in progress  - Ambulation is improved to prior level of function in 6-8 weeks- in progress  - Stair climbing is improved to prior level of function in 6-8 weeks- in progress  - Squatting is improved to prior level of function in 6-8 weeks- in progress        Precautions: WBAT    Manuals 3/17 3/20 3/24 3/27 4/1 4/3 4/10 4/14 4/17 4/21   PROM AZ AZ AZ AZ AZ AZ AZ AZ AZ AZ   Hip flexor str    AZ         Quad str    AZ AZ AZ AZ AZ AZ AZ   Iliopsoas release     AZ AZ AZ AZ  AZ   UPAs  "L4-S1       AZ AZ AZ AZ AZ AZ   STM/MFR          AZ    Neuro Re-Ed             TA isometrics 2x10 5s 3x10 5s 3x12  3x12 5s 3x15 5s 3x15 5s 3x15 5s 3x15 5s 3x15 5s 3x15   Marches  2x10 3x10 3x10 3x12 3x12 3x20 3x20 3x20 3x20                             Ther Ex             Bike  5' 5' 10' 10' 10' 10' 10' 10' 10'   Heel slides 3x10 3x10 3x10 3x10 3x10 3x10 30x 30x 30x 30x   Sup hip abd 3x10 3x10 3x10  3x10 3x10 3x10 30x 30x 30x 30x                Glute sets 3x10 5s 3x10 5s 3x10 5s 3x10 5s 3x10 5s 3x10 5s 30x5s 30x5s 30x5s 30x5s   Quad sets 3x10 5s 3x10 5s 3x10 5s 3x10 5s 3x10 5s 3x10 5s 30x5s 30x5s 30x5s 30x5s   Sup hip add 2x10 5s 3x10 5s 3x12 5s  3x12 5s 3x15 5s 3x15 5s 3x15 5s 3x15 5s 3x15 5s 3x15 5s   Sup hip abd 2x10 5s 3x10 5s green 3x10 5s blue 3x12 5s blue 3x15 5s blue 3x15 5s blue 3x15 5s blue 3x10 5s black 3x10 5s black 3x12 5s black   Bridges  2x10 3x10 3x12 3x15 3x15 3x15 3x15 3x15 3x15   Heel raises  2x10 3x10 3x10 3x10 3x10 3x12 3x15 3x15 3x15   Standing hip abd  2x10 3x10 3x10 3x10 3x10 3x12 3x15 3x15 3x15   SL balance   3x to failure SL floor  5x to failure SL floor 5x to failure SL floor 5x to failure SL floor 10x to failure SL floor 10x to failure SL floor 5x to failure airex 10x to failure airex   Standing hip flexor str       10x2s 10x2s 10x2s 10x2s   Ther Activity                                       Gait Training   X s/ AD X s/ AD         Stair negotiation   Up R/ down R 2x10 4\" Up R/ down R 2x10 4\" Up R/ down R 3x10 6\"  Up R/ down R 3x10 6\" UP R/ down R 3x10 8\" UP R/ down R 3x10 8\" UP R/ down R 3x10 8\"   Lateral step ups   2x10 4\" 2x10 4\" 3x10 6\"  3x10 6\" 3x10 8\" 3x10 8\" 3x10 8\"   Modalities              10' 10' 10' 10' 10' 10' 10' 10' 10' 10'                                     "

## 2025-04-22 ENCOUNTER — OFFICE VISIT (OUTPATIENT)
Age: 70
End: 2025-04-22

## 2025-04-22 VITALS — BODY MASS INDEX: 28.17 KG/M2 | HEIGHT: 63 IN | WEIGHT: 159 LBS

## 2025-04-22 DIAGNOSIS — Z96.641 AFTERCARE FOLLOWING RIGHT HIP JOINT REPLACEMENT SURGERY: Primary | ICD-10-CM

## 2025-04-22 DIAGNOSIS — M51.360 DEGENERATION OF INTERVERTEBRAL DISC OF LUMBAR REGION WITH DISCOGENIC BACK PAIN: ICD-10-CM

## 2025-04-22 DIAGNOSIS — Z47.1 AFTERCARE FOLLOWING RIGHT HIP JOINT REPLACEMENT SURGERY: Primary | ICD-10-CM

## 2025-04-22 PROCEDURE — 99024 POSTOP FOLLOW-UP VISIT: CPT | Performed by: STUDENT IN AN ORGANIZED HEALTH CARE EDUCATION/TRAINING PROGRAM

## 2025-04-22 NOTE — ASSESSMENT & PLAN NOTE
7 weeks s/p right anterior total hip arthroplasty performed on 03/06/2025     - continue multi-modal pain control   - Weight bearing status: as tolerated  - DVT ppx: completed  - Incision care: no restrictions  - Continue PT  - F/U in 6 weeks   - referral to spine and pain for lumbar radiculopathy

## 2025-04-22 NOTE — PROGRESS NOTES
Subjective: 69 y.o. female presents to the office 7 weeks s/p right anterior total hip arthroplasty performed on 03/06/2025. She has been doing well overall. Pain controlled. Progressing well with PT, ambulating independently. Incision without drainage. Denies fevers or chills    Physical Exam:  Incision: healed, no erythema or drainage  NTTP  ROM: full without pain  5/5 IP/Q/HS/TA/GS, 2+ DP/PT, SILT DP/SP/S/S/TN    Previous XR right hip: s/p press-fit total hip arthroplasty, components in good position. No fracture or dislocation   Assessment & Plan  Aftercare following right hip joint replacement surgery  7 weeks s/p right anterior total hip arthroplasty performed on 03/06/2025     - continue multi-modal pain control   - Weight bearing status: as tolerated  - DVT ppx: completed  - Incision care: no restrictions  - Continue PT  - F/U in 6 weeks   - referral to spine and pain for lumbar radiculopathy           Scribe Attestation      I,:   am acting as a scribe while in the presence of the attending physician.:       I,:   personally performed the services described in this documentation    as scribed in my presence.:

## 2025-04-24 ENCOUNTER — OFFICE VISIT (OUTPATIENT)
Dept: PHYSICAL THERAPY | Facility: MEDICAL CENTER | Age: 70
End: 2025-04-24
Payer: MEDICARE

## 2025-04-24 DIAGNOSIS — Z96.641 STATUS POST TOTAL REPLACEMENT OF RIGHT HIP: Primary | ICD-10-CM

## 2025-04-24 DIAGNOSIS — M16.11 PRIMARY OSTEOARTHRITIS OF RIGHT HIP: ICD-10-CM

## 2025-04-24 PROCEDURE — 97140 MANUAL THERAPY 1/> REGIONS: CPT | Performed by: PHYSICAL THERAPIST

## 2025-04-24 PROCEDURE — 97112 NEUROMUSCULAR REEDUCATION: CPT | Performed by: PHYSICAL THERAPIST

## 2025-04-24 PROCEDURE — 97110 THERAPEUTIC EXERCISES: CPT | Performed by: PHYSICAL THERAPIST

## 2025-04-24 NOTE — PROGRESS NOTES
"Daily Note     Today's date: 2025  Patient name: Kaya Connor  : 1955  MRN: [id number]  Referring provider: Surekha Curtis PA-C  Dx:   Encounter Diagnosis     ICD-10-CM    1. Status post total replacement of right hip  Z96.641       2. Primary osteoarthritis of right hip  M16.11                      Subjective: Patient states that she is doing well.       Objective: See treatment diary below.      Assessment:  Patient demonstrates good tolerance to progressions.  Tolerated treatment well. Patient would benefit from continued PT.      Plan: Continue per plan of care.      Precautions: WBAT    Manuals             PROM AZ            Iliopsoas release AZ            UPAs L4-S1   AZ                         Neuro Re-Ed             TA isometrics 3x15 5s            Marches 3x20                                      Ther Ex             Bike 10'            Heel slides 30x            Sup hip abd 30x                         Glute sets 30x5s            Quad sets 30x5s            Sup hip add 3x15 5s            Sup hip abd 3x15 5s black            Bridges 3x15            Heel raises 3x15            Standing hip abd 3x15            SL balance 10x to failure airex            Standing hip flexor str 10x2s            Ther Activity                                       Gait Training             Stair negotiation UP R/ down R 3x10 8\"            Lateral step ups 3x10 8\"            Modalities             MH 10'                                                "

## 2025-04-28 ENCOUNTER — OFFICE VISIT (OUTPATIENT)
Dept: PHYSICAL THERAPY | Facility: MEDICAL CENTER | Age: 70
End: 2025-04-28
Payer: MEDICARE

## 2025-04-28 DIAGNOSIS — M16.11 PRIMARY OSTEOARTHRITIS OF RIGHT HIP: ICD-10-CM

## 2025-04-28 DIAGNOSIS — Z96.641 STATUS POST TOTAL REPLACEMENT OF RIGHT HIP: Primary | ICD-10-CM

## 2025-04-28 PROCEDURE — 97110 THERAPEUTIC EXERCISES: CPT | Performed by: PHYSICAL THERAPIST

## 2025-04-28 PROCEDURE — 97112 NEUROMUSCULAR REEDUCATION: CPT | Performed by: PHYSICAL THERAPIST

## 2025-04-28 PROCEDURE — 97140 MANUAL THERAPY 1/> REGIONS: CPT | Performed by: PHYSICAL THERAPIST

## 2025-04-28 NOTE — PROGRESS NOTES
"Daily Note     Today's date: 2025  Patient name: Kaya Connor  : 1955  MRN: [id number]  Referring provider: Surekha Curtis PA-C  Dx:   Encounter Diagnosis     ICD-10-CM    1. Status post total replacement of right hip  Z96.641       2. Primary osteoarthritis of right hip  M16.11                      Subjective: Patient states that she is doing well.       Objective: See treatment diary below.      Assessment:  Patient demonstrates good tolerance to progressions without pain.  Tolerated treatment well. Patient would benefit from continued PT.      Plan: Continue per plan of care.      Precautions: WBAT    Manuals            PROM AZ AZ           Iliopsoas release AZ            UPAs L4-S1   AZ AZ                        Neuro Re-Ed             TA isometrics 3x15 5s 3x15 5s           Marches 3x20 3x20                                     Ther Ex             Bike 10' 10'           Heel slides 30x 30x           Sup hip abd 30x 30x                        Glute sets 30x5s 30x5s           Quad sets 30x5s 30x5s           Sup hip add 3x15 5s 3x15 5s           Sup hip abd 3x15 5s black 3x15 5s black           Bridges 3x15 3x15           Heel raises 3x15 3x15           Standing hip abd 3x15 2x10 2#           SL balance 10x to failure airex 10x to failure airex           Standing hip flexor str 10x2s 10x2s           Squats  2x10 to wall           Ther Activity                                       Gait Training             Stair negotiation UP R/ down R 3x10 8\" UP R/ down R 3x10 8\"           Lateral step ups 3x10 8\" 3x10 8\"           Modalities             MH 10' 10'                                                 "

## 2025-05-01 ENCOUNTER — OFFICE VISIT (OUTPATIENT)
Dept: PAIN MEDICINE | Facility: MEDICAL CENTER | Age: 70
End: 2025-05-01
Payer: MEDICARE

## 2025-05-01 ENCOUNTER — APPOINTMENT (OUTPATIENT)
Dept: PHYSICAL THERAPY | Facility: MEDICAL CENTER | Age: 70
End: 2025-05-01
Payer: MEDICARE

## 2025-05-01 ENCOUNTER — APPOINTMENT (OUTPATIENT)
Dept: RADIOLOGY | Facility: MEDICAL CENTER | Age: 70
End: 2025-05-01
Payer: MEDICARE

## 2025-05-01 VITALS — HEIGHT: 63 IN | BODY MASS INDEX: 28.17 KG/M2 | WEIGHT: 159 LBS

## 2025-05-01 DIAGNOSIS — M16.11 PRIMARY OSTEOARTHRITIS OF RIGHT HIP: ICD-10-CM

## 2025-05-01 DIAGNOSIS — M54.50 CHRONIC BILATERAL LOW BACK PAIN WITHOUT SCIATICA: Primary | ICD-10-CM

## 2025-05-01 DIAGNOSIS — M47.816 LUMBAR SPONDYLOSIS: ICD-10-CM

## 2025-05-01 DIAGNOSIS — M79.18 MYOFASCIAL PAIN SYNDROME: ICD-10-CM

## 2025-05-01 DIAGNOSIS — G89.29 CHRONIC BILATERAL LOW BACK PAIN WITHOUT SCIATICA: ICD-10-CM

## 2025-05-01 DIAGNOSIS — M54.50 CHRONIC BILATERAL LOW BACK PAIN WITHOUT SCIATICA: ICD-10-CM

## 2025-05-01 DIAGNOSIS — G89.29 CHRONIC BILATERAL LOW BACK PAIN WITHOUT SCIATICA: Primary | ICD-10-CM

## 2025-05-01 PROCEDURE — 72110 X-RAY EXAM L-2 SPINE 4/>VWS: CPT

## 2025-05-01 PROCEDURE — 99204 OFFICE O/P NEW MOD 45 MIN: CPT | Performed by: PHYSICAL MEDICINE & REHABILITATION

## 2025-05-01 RX ORDER — METHOCARBAMOL 500 MG/1
500 TABLET, FILM COATED ORAL
Qty: 30 TABLET | Refills: 0 | Status: SHIPPED | OUTPATIENT
Start: 2025-05-01 | End: 2025-05-31

## 2025-05-01 NOTE — PROGRESS NOTES
Assessment  1. Chronic bilateral low back pain without sciatica    2. Myofascial pain syndrome    3. Lumbar spondylosis        Plan  Obtain lumbar spine x-ray  Initiate Robaxin at nighttime  Follow-up in 4 weeks to evaluate her response  Consider bilateral L3-5 medial branch nerve blocks if she continues with axial lumbar pain worse with extension    My impressions and treatment recommendations were discussed in detail with the patient who verbalized understanding and had no further questions.  Discharge instructions were provided. I personally saw and examined the patient and I agree with the above discussed plan of care.    Orders Placed This Encounter   Procedures    XR spine lumbar minimum 4 views non injury     Standing Status:   Future     Expected Date:   5/1/2025     Expiration Date:   5/1/2029     Scheduling Instructions:      Bring along any outside films relating to this procedure.           New Medications Ordered This Visit   Medications    methocarbamol (ROBAXIN) 500 mg tablet     Sig: Take 1 tablet (500 mg total) by mouth daily at bedtime     Dispense:  30 tablet     Refill:  0       History of Present Illness    Kaya Connor is a 69 y.o. female seen in consultation at the request of Dr. Sotelo regarding chronic lower back pain.  She is describing tightness sensation and pain in the axial lumbar spine without radiation down the legs.    She describes moderate to severe intensity pain currently rated as a 5/10 which is intermittent in nature worse in the evening and nighttime characterized as pressure-like throbbing dull and aching.    Aggravating factors include standing bending sitting.  Alleviating factors include lying down walking exercise and relaxation.    No lumbar spine imaging has been performed recently.    She has tried traction in the past with chiropractors with moderate relief also moderate relief from physical therapy exercise and heat or ice application.    Social history negative  for tobacco marijuana and alcohol use.        I have personally reviewed and/or updated the patient's past medical history, past surgical history, family history, social history, current medications, allergies, and vital signs today.     Review of Systems   Constitutional:  Positive for unexpected weight change. Negative for fatigue.   HENT:  Negative for ear pain, hearing loss and sinus pain.    Eyes:  Negative for redness and visual disturbance.   Respiratory:  Negative for shortness of breath and wheezing.    Cardiovascular:  Negative for chest pain and palpitations.   Gastrointestinal:  Negative for abdominal pain, diarrhea and rectal pain.   Endocrine: Negative for polydipsia and polyuria.   Genitourinary:  Negative for difficulty urinating and frequency.   Musculoskeletal:  Positive for back pain. Negative for gait problem and myalgias.   Skin:  Negative for rash.   Allergic/Immunologic: Negative for food allergies.   Neurological:  Negative for numbness and headaches.   Psychiatric/Behavioral:  Negative for decreased concentration and sleep disturbance. The patient is not nervous/anxious.        Patient Active Problem List   Diagnosis    Basal cell carcinoma of nose    Disc degeneration, lumbar    Mixed hyperlipidemia    Overweight    Achilles tendon disorder, left    Restless leg    Asymptomatic postmenopausal state    Family history of abdominal aortic aneurysm (AAA)    Headache    Chronic fatigue    Osteoarthritis of left hip    Primary osteoarthritis of right hip    Aftercare following right hip joint replacement surgery       Past Medical History:   Diagnosis Date    Achilles tendinitis     Left ankle    Allergic Seasonal    Anesthesia     2/12/25 per pt - reports BP low with use of anesthesia    Arthritis Back and hips    Fibromyalgia, primary     hx of fibromyalgia    Generalized anxiety disorder 06/06/2019    Hip pain     RIGHT -  discomfort    Hyperlipidemia     last assessed - [id number]    Lumbar  degenerative disc disease     Osteoarthritis     Sciatica, left side     last assessed - [id number]       Past Surgical History:   Procedure Laterality Date    ARTHROPLASTY HIP TOTAL ANTERIOR Right 3/6/2025    Procedure: ARTHROPLASTY HIP TOTAL ANTERIOR,NAVIGATED;  Surgeon: Oleg Sotelo DO;  Location: [facility];  Service: Orthopedics    COLONOSCOPY      Complete colonoscopy - nml; resolved - [id number]    DENTAL SURGERY      tooth- titanium implant - used liquid versed per pt    LUMBAR EPIDURAL INJECTION      child delivery    OTHER SURGICAL HISTORY      nasal surgery to remove basal cell lesion       Family History   Problem Relation Age of Onset    Diabetes Mother         After age 58    Hypertension Mother     Diabetes type II Mother         Type 2 diabetes mellitus    Hypertension Father     Heart disease Father         Myocardial ischemia    Abdominal aortic aneurysm Father     Heart defect Sister     Stroke Sister     Heart Valve Disease Sister         patent foramen ovale found     Clotting disorder Sister     Mental illness Sister     Hyperlipidemia Sister     Anesthesia problems Sister     Arthritis Sister         Had a RNR and has rt shoulder arthritis.    Hyperlipidemia Sister     No Known Problems Daughter     No Known Problems Maternal Grandmother     No Known Problems Maternal Grandfather     No Known Problems Paternal Grandmother     No Known Problems Paternal Grandfather     Cancer Brother         Lung CA Smoker    Diabetes type II Brother         Type 2 diabetes mellitus    Hypertension Brother     Hyperlipidemia Brother     Heart disease Brother     Diabetes Brother     No Known Problems Paternal Aunt     No Known Problems Paternal Aunt     No Known Problems Paternal Aunt     Arthritis Sister         Had a RNR    Thyroid disease Neg Hx        Social History     Occupational History    Not on file   Tobacco Use    Smoking status: Never    Smokeless tobacco: Never    Tobacco comments:      Never a smoker or use of any tobacco products per pt    Vaping Use    Vaping status: Never Used   Substance and Sexual Activity    Alcohol use: Yes     Comment: rare - occasional glass of wine    Drug use: Not Currently     Comment: medical marijuana card-- not using gummies at this time    Sexual activity: Yes     Partners: Male     Birth control/protection: None     Comment: Not needed and  partner had tests done       Current Outpatient Medications on File Prior to Visit   Medication Sig    Acetaminophen Extra Strength 500 MG TABS TAKE 2 TABLETS (1,000 MG TOTAL) BY MOUTH EVERY 8 HOURS    ascorbic acid (VITAMIN C) 500 MG tablet Take 1 tablet (500 mg total) by mouth 2 (two) times a day    celecoxib (CeleBREX) 200 mg capsule Take 1 capsule (200 mg total) by mouth 2 (two) times a day    cetirizine (ZyrTEC) 10 mg tablet Take 10 mg by mouth if needed for allergies    cholecalciferol (VITAMIN D3) 25 mcg (1,000 units) tablet Take 3,000 Units by mouth daily 1200 Calcium - within the vitamin D3    ezetimibe (ZETIA) 10 mg tablet Take 1 tablet (10 mg total) by mouth daily    gabapentin (NEURONTIN) 100 mg capsule 2 tablets daily at bedtime    Magnesium 250 MG TABS Take by mouth daily at bedtime    Multiple Vitamins-Minerals (multivitamin with minerals) tablet Take 1 tablet by mouth daily    acetaminophen (TYLENOL) 500 mg tablet Take 2 tablets (1,000 mg total) by mouth every 8 (eight) hours    aspirin (ECOTRIN LOW STRENGTH) 81 mg EC tablet Take 1 tablet (81 mg total) by mouth 2 (two) times a day    celecoxib (CeleBREX) 100 mg capsule Take 2 capsules (200 mg total) by mouth 2 (two) times a day    Cyanocobalamin (Vitamin B-12) 500 MCG SUBL Take by mouth (Patient not taking: Reported on 5/1/2025)    folic acid (FOLVITE) 1 mg tablet Take 1 tablet (1 mg total) by mouth daily    mupirocin (BACTROBAN) 2 % ointment Apply topically to the inside of the left and right nostrils twice daily for 5 days before surgery, including the morning  "of surgery.    ondansetron (ZOFRAN-ODT) 4 mg disintegrating tablet Take 1 tablet (4 mg total) by mouth every 6 (six) hours as needed for nausea or vomiting (Patient not taking: Reported on 4/22/2025)    senna-docusate sodium (SENOKOT S) 8.6-50 mg per tablet Take 1 tablet by mouth daily    [Paused] tirzepatide (Zepbound) 7.5 mg/0.5 mL auto-injector Inject 7.5mg weekly Disp Vial (NDC not available in EMR)     No current facility-administered medications on file prior to visit.       Allergies   Allergen Reactions    Statins Myalgia       Physical Exam    Ht 5' 3\" (1.6 m)   Wt 72.1 kg (159 lb)   BMI 28.17 kg/m²     Constitutional: normal, well developed, well nourished, alert, in no distress and non-toxic and no overt pain behavior.  Eyes: anicteric  HEENT: grossly intact  Neck: supple, symmetric, trachea midline and no masses   Pulmonary:even and unlabored  Cardiovascular:No edema or pitting edema present  Psychiatric:Mood and affect appropriate  Neurologic:Cranial Nerves II-XII grossly intact  Musculoskeletal:normal, except for some tenderness to palpation over the lumbar paraspinal muscles reproducing some of her pain, pain is also reproduced with lumbar extension as well as extension with rotation in both directions, mild pain with forward flexion of lumbar spine    Imaging  "

## 2025-05-02 ENCOUNTER — OFFICE VISIT (OUTPATIENT)
Dept: PHYSICAL THERAPY | Facility: MEDICAL CENTER | Age: 70
End: 2025-05-02
Attending: PHYSICIAN ASSISTANT
Payer: MEDICARE

## 2025-05-02 DIAGNOSIS — M16.11 PRIMARY OSTEOARTHRITIS OF RIGHT HIP: ICD-10-CM

## 2025-05-02 DIAGNOSIS — Z96.641 STATUS POST TOTAL REPLACEMENT OF RIGHT HIP: Primary | ICD-10-CM

## 2025-05-02 PROCEDURE — 97140 MANUAL THERAPY 1/> REGIONS: CPT | Performed by: PHYSICAL THERAPIST

## 2025-05-02 PROCEDURE — 97112 NEUROMUSCULAR REEDUCATION: CPT | Performed by: PHYSICAL THERAPIST

## 2025-05-02 PROCEDURE — 97110 THERAPEUTIC EXERCISES: CPT | Performed by: PHYSICAL THERAPIST

## 2025-05-02 RX ORDER — CELECOXIB 200 MG/1
200 CAPSULE ORAL 2 TIMES DAILY
Qty: 60 CAPSULE | Refills: 0 | OUTPATIENT
Start: 2025-05-02

## 2025-05-02 NOTE — PROGRESS NOTES
"Daily Note     Today's date: 2025  Patient name: Kaya Connor  : 1955  MRN: [id number]  Referring provider: Surekha Curtis PA-C  Dx:   Encounter Diagnosis     ICD-10-CM    1. Status post total replacement of right hip  Z96.641       2. Primary osteoarthritis of right hip  M16.11                      Subjective: Patient states that she is doing well.       Objective: See treatment diary below      Assessment:  Patient demonstrates good tolerance to progressions.  Tolerated treatment well. Patient would benefit from continued PT.      Plan: Continue per plan of care.      Precautions: WBAT    Manuals           PROM AZ AZ AZ          Iliopsoas release AZ            UPAs L4-S1   AZ AZ AZ                       Neuro Re-Ed             TA isometrics 3x15 5s 3x15 5s 3x15 5s          Marches 3x20 3x20 3x20                                    Ther Ex             Bike 10' 10' 10'          Heel slides 30x 30x 30x          Sup hip abd 30x 30x 30x                       Glute sets 30x5s 30x5s 30x5s          Quad sets 30x5s 30x5s 30x5s          Sup hip add 3x15 5s 3x15 5s 3x15 5s          Sup hip abd 3x15 5s black 3x15 5s black 3x15 5s black          Bridges 3x15 3x15 3x15          Heel raises 3x15 3x15 3x15          Standing hip abd 3x15 2x10 2# 2x10 2#          SL balance 10x to failure airex 10x to failure airex 10x to failure airex          Standing hip flexor str 10x2s 10x2s 10x2s          Squats  2x10 to wall 3x10 to wall          Ther Activity                                       Gait Training             Stair negotiation UP R/ down R 3x10 8\" UP R/ down R 3x10 8\" UP R/ down R 3x10 8\"          Lateral step ups 3x10 8\" 3x10 8\" 3x10 8\"          Modalities             MH 10' 10' 10'                                                  "

## 2025-05-02 NOTE — TELEPHONE ENCOUNTER
Requested medication(s) are due for refill today: Yes  **If antibiotic or given during sick visit, contact patient to discuss current symptoms.   **Confirm prescribing provider    LOV:  2/11/25  **If longer then 1 year, contact patient to schedule annual PRIOR to refilling. Once scheduled, adjust refill for 30 days, no refills.  **Update CareEverywhere to confirm not being seen elsewhere    NOV:  7/15/25    Is patient due for annual visit: No  **If future appointment, adjust to annual/follow up.  ** No appointment call to schedule annual/follow up.    Route to PCP, unless PCP no longer here, then physician they are seeing next.

## 2025-05-05 ENCOUNTER — OFFICE VISIT (OUTPATIENT)
Dept: PHYSICAL THERAPY | Facility: MEDICAL CENTER | Age: 70
End: 2025-05-05
Payer: MEDICARE

## 2025-05-05 ENCOUNTER — RESULTS FOLLOW-UP (OUTPATIENT)
Dept: PAIN MEDICINE | Facility: MEDICAL CENTER | Age: 70
End: 2025-05-05

## 2025-05-05 DIAGNOSIS — Z96.641 STATUS POST TOTAL REPLACEMENT OF RIGHT HIP: Primary | ICD-10-CM

## 2025-05-05 DIAGNOSIS — M16.11 PRIMARY OSTEOARTHRITIS OF RIGHT HIP: ICD-10-CM

## 2025-05-05 PROCEDURE — 97140 MANUAL THERAPY 1/> REGIONS: CPT | Performed by: PHYSICAL THERAPIST

## 2025-05-05 PROCEDURE — 97112 NEUROMUSCULAR REEDUCATION: CPT | Performed by: PHYSICAL THERAPIST

## 2025-05-05 PROCEDURE — 97110 THERAPEUTIC EXERCISES: CPT | Performed by: PHYSICAL THERAPIST

## 2025-05-05 NOTE — PROGRESS NOTES
"Daily Note     Today's date: 2025  Patient name: Kaya Connor  : 1955  MRN: [id number]  Referring provider: Surekha Curtis PA-C  Dx:   Encounter Diagnosis     ICD-10-CM    1. Status post total replacement of right hip  Z96.641       2. Primary osteoarthritis of right hip  M16.11                      Subjective: Patient states that she is doing well.       Objective: See treatment diary below.      Assessment:  Patient demonstrates good tolerance to progressions.  Tolerated treatment well. Patient would benefit from continued PT.      Plan: Continue per plan of care.      Precautions: WBAT    Manuals          PROM AZ AZ AZ AZ         Iliopsoas release AZ            UPAs L4-S1   AZ AZ AZ AZ                      Neuro Re-Ed             TA isometrics 3x15 5s 3x15 5s 3x15 5s 3x15 5s         Marches 3x20 3x20 3x20 3x20                                   Ther Ex             Bike 10' 10' 10' 10'         Heel slides 30x 30x 30x 30x         Sup hip abd 30x 30x 30x 30x                      Glute sets 30x5s 30x5s 30x5s 30x5s         Quad sets 30x5s 30x5s 30x5s 30x5s         Sup hip add 3x15 5s 3x15 5s 3x15 5s 3x15 5s         Sup hip abd 3x15 5s black 3x15 5s black 3x15 5s black 3x15 5s black         Bridges 3x15 3x15 3x15 3x15         Heel raises 3x15 3x15 3x15 3x15         Standing hip abd 3x15 2x10 2# 2x10 2# 3x10 2#         SL balance 10x to failure airex 10x to failure airex 10x to failure airex 10x to failure airex         Standing hip flexor str 10x2s 10x2s 10x2s 10x2s         Squats  2x10 to wall 3x10 to wall 3x10 to wall         Ther Activity                                       Gait Training             Stair negotiation UP R/ down R 3x10 8\" UP R/ down R 3x10 8\" UP R/ down R 3x10 8\" UP R/ down R 3x10 8\"         Lateral step ups 3x10 8\" 3x10 8\" 3x10 8\" 3x10 8\"         Modalities             MH 10' 10' 10' 10'                                                   "

## 2025-05-08 ENCOUNTER — APPOINTMENT (OUTPATIENT)
Dept: PHYSICAL THERAPY | Facility: MEDICAL CENTER | Age: 70
End: 2025-05-08
Payer: MEDICARE

## 2025-05-12 ENCOUNTER — APPOINTMENT (OUTPATIENT)
Dept: PHYSICAL THERAPY | Facility: MEDICAL CENTER | Age: 70
End: 2025-05-12
Payer: MEDICARE

## 2025-05-15 ENCOUNTER — OFFICE VISIT (OUTPATIENT)
Dept: PHYSICAL THERAPY | Facility: MEDICAL CENTER | Age: 70
End: 2025-05-15
Payer: MEDICARE

## 2025-05-15 DIAGNOSIS — Z96.641 STATUS POST TOTAL REPLACEMENT OF RIGHT HIP: Primary | ICD-10-CM

## 2025-05-15 DIAGNOSIS — M16.11 PRIMARY OSTEOARTHRITIS OF RIGHT HIP: ICD-10-CM

## 2025-05-15 PROCEDURE — 97140 MANUAL THERAPY 1/> REGIONS: CPT | Performed by: PHYSICAL THERAPIST

## 2025-05-15 PROCEDURE — 97112 NEUROMUSCULAR REEDUCATION: CPT | Performed by: PHYSICAL THERAPIST

## 2025-05-15 PROCEDURE — 97110 THERAPEUTIC EXERCISES: CPT | Performed by: PHYSICAL THERAPIST

## 2025-05-15 NOTE — PROGRESS NOTES
Daily Note     Today's date: 5/15/2025  Patient name: Kaya Connor  : 1955  MRN: [id number]  Referring provider: Surekha Curtis PA-C  Dx:   Encounter Diagnosis     ICD-10-CM    1. Status post total replacement of right hip  Z96.641       2. Primary osteoarthritis of right hip  M16.11                      Subjective:  Patient states that she feels really good and her trip to [place] went well without pain.      Objective: See treatment diary below.      Assessment:  Kaya Connor has been compliant with attending PT and home exercise program since initial eval.  Kaya  has made improvements in objective data since initial evalulation and has achieved all goals.  Patient reports having returned to their prior level or function. Patient provided with updated Home Exercise Program, all questions answered, verbalized understanding and agreement to plan of care. Thus it was mutually decided to discontinue this episode of care and transition to Home Exercise Program.      Plan: d/c to HEP.    Precautions: WBAT    Manuals 4/24 4/28 5/2 5/5 5/15        PROM AZ AZ AZ AZ AZ        Iliopsoas release AZ            UPAs L4-S1   AZ AZ AZ AZ AZ                     Neuro Re-Ed             TA isometrics 3x15 5s 3x15 5s 3x15 5s 3x15 5s 3x15 5s        Marches 3x20 3x20 3x20 3x20 3x20                                  Ther Ex             Bike 10' 10' 10' 10 10'        Heel slides 30x 30x 30x 30x 30x        Sup hip abd 30x 30x 30x 30x 30x                     Glute sets 30x5s 30x5s 30x5s 30x5s 30x5s        Quad sets 30x5s 30x5s 30x5s 30x5s 30x5s        Sup hip add 3x15 5s 3x15 5s 3x15 5s 3x15 5s 3x15 5s         Sup hip abd 3x15 5s black 3x15 5s black 3x15 5s black 3x15 5s black 3x15 5s black        Bridges 3x15 3x15 3x15 3x15 3x15        Heel raises 3x15 3x15 3x15 3x15 3x15        Standing hip abd 3x15 2x10 2# 2x10 2# 3x10 2# 3x10 2#        SL balance 10x to failure airex 10x to failure airex 10x to failure airex 10x to failure  "airex 10x to failure airex        Standing hip flexor str 10x2s 10x2s 10x2s 10x2s 10x2s        Squats  2x10 to wall 3x10 to wall 3x10 to wall 3x10 to wall        Ther Activity                                                    Gait Training             Stair negotiation UP R/ down R 3x10 8\" UP R/ down R 3x10 8\" UP R/ down R 3x10 8\" UP R/ down R 3x10 8\" UP R/ down R 3x10 8\"        Lateral step ups 3x10 8\" 3x10 8\" 3x10 8\" 3x10 8\" 3x10 8\"        [facility] 10' 10' 10' 10' dec                                                    "

## 2025-06-03 ENCOUNTER — OFFICE VISIT (OUTPATIENT)
Age: 70
End: 2025-06-03

## 2025-06-03 VITALS — BODY MASS INDEX: 28.17 KG/M2 | WEIGHT: 159 LBS | HEIGHT: 63 IN

## 2025-06-03 DIAGNOSIS — Z47.1 AFTERCARE FOLLOWING RIGHT HIP JOINT REPLACEMENT SURGERY: Primary | ICD-10-CM

## 2025-06-03 DIAGNOSIS — Z96.641 AFTERCARE FOLLOWING RIGHT HIP JOINT REPLACEMENT SURGERY: Primary | ICD-10-CM

## 2025-06-03 PROCEDURE — 99024 POSTOP FOLLOW-UP VISIT: CPT | Performed by: STUDENT IN AN ORGANIZED HEALTH CARE EDUCATION/TRAINING PROGRAM

## 2025-06-03 NOTE — ASSESSMENT & PLAN NOTE
-12 weeks status post right total hip arthroplasty done on 3/6/2025.  - Discussed that she should let pain be a guide for activity tolerance.   - continue multi-modal pain control   - Weight bearing status: WBAT  - DVT ppx: competed  - Incision care: No restrictions  - Continue HEP.  - F/U 9 months for repeat right hip x-rays

## 2025-06-03 NOTE — PROGRESS NOTES
Subjective:Patient seen and examined. Pain controlled. Progressing well. Incision without drainage. Denies fevers or chills. She states that she overall is doing well but does get some discomfort along the lateral aspect of her hip. She states that she believes she is overdoing it as she is has been doing yard work and cleaning her pool without restrictions. She has finished PT and been doing HEP. She was at the airport in [place] and she had no complaints. She is overall pleased.    Physical Exam:  Incision: Clean, dry, intact  ROM: Normal without pain  5/5 IP/Q/HS/TA/GS, 2+ DP/PT, SILT DP/SP/S/S/TN      Assessment/Plan:  12 weeks s/p right total hip arthroplasty done on 3/6/2025.    Assessment & Plan  Aftercare following right hip joint replacement surgery  -12 weeks status post right total hip arthroplasty done on 3/6/2025.  - Discussed that she should let pain be a guide for activity tolerance.   - continue multi-modal pain control   - Weight bearing status: WBAT  - DVT ppx: competed  - Incision care: No restrictions  - Continue HEP.  - F/U 9 months for repeat right hip x-rays           Scribe Attestation    I,:  Eric Castaneda am acting as a scribe while in the presence of the attending physician.:       I,:  Oleg Sotelo, DO personally performed the services described in this documentation    as scribed in my presence.:

## 2025-06-04 ENCOUNTER — OFFICE VISIT (OUTPATIENT)
Dept: PAIN MEDICINE | Facility: MEDICAL CENTER | Age: 70
End: 2025-06-04
Payer: MEDICARE

## 2025-06-04 VITALS — HEIGHT: 63 IN | BODY MASS INDEX: 27.46 KG/M2 | WEIGHT: 155 LBS

## 2025-06-04 DIAGNOSIS — M54.2 NECK PAIN: ICD-10-CM

## 2025-06-04 DIAGNOSIS — G89.29 CHRONIC BILATERAL LOW BACK PAIN WITHOUT SCIATICA: ICD-10-CM

## 2025-06-04 DIAGNOSIS — M47.816 LUMBAR SPONDYLOSIS: Primary | ICD-10-CM

## 2025-06-04 DIAGNOSIS — M54.50 CHRONIC BILATERAL LOW BACK PAIN WITHOUT SCIATICA: ICD-10-CM

## 2025-06-04 DIAGNOSIS — M79.18 MYOFASCIAL PAIN SYNDROME: ICD-10-CM

## 2025-06-04 PROCEDURE — 99214 OFFICE O/P EST MOD 30 MIN: CPT

## 2025-06-04 RX ORDER — METHOCARBAMOL 500 MG/1
500 TABLET, FILM COATED ORAL
Qty: 30 TABLET | Refills: 2 | Status: SHIPPED | OUTPATIENT
Start: 2025-06-04 | End: 2025-07-04

## 2025-06-04 NOTE — PROGRESS NOTES
Name: Kaya Connor      : 1955      MRN: [id number]  Encounter Provider: Debbie Davenport PA-C  Encounter Date: 2025   Encounter department: [facility] [place]  :  Assessment & Plan  Lumbar spondylosis         Myofascial pain syndrome         Chronic bilateral low back pain without sciatica    Orders:    methocarbamol (ROBAXIN) 500 mg tablet; Take 1 tablet (500 mg total) by mouth daily at bedtime    Neck pain    Orders:    XR spine cervical complete 4 or 5 vw non injury; Future      The patient has been experiencing moderate to severe axial spine pain that is causing functional deficit.  The pain has been present for at least 3 months and is not improving with conservative care.  Currently the patient is not experiencing any radicular features nor neurogenic claudication.  Non-facet pathology has been ruled out on clinical evaluation.    I discussed with patient that she would be a candidate for bilateral L3-5 medial branch blocks with intention of moving forward towards radiofrequency ablation if there is an appropriate diagnostic response. The initial blocks will be performed with 2% lidocaine and if an appropriate response is obtained upon review of the patient's pain diary, a confirmatory block will be scheduled.  Patient states she would like to hold off on this procedure at this time.  Patient states she will call the office when she would like to move forward with scheduling bilateral L3-5 MBB 1.    Continue the use of methocarbamol as prescribed.  Patient states she tolerates this medication well without side effects.  Refills of this medication were sent to patient's pharmacy today.    Continue the use of gabapentin as prescribed by PCP.    Will obtain updated cervical spine x-ray due to patient's chronic neck pain.  Cervical spine x-rays ordered.    Patient will follow-up with Dr. Antonio for new issue appointment for chronic neck pain.    My impressions and treatment  recommendations were discussed in detail with the patient who verbalized understanding and had no further questions.  Discharge instructions were provided. I personally saw and examined the patient and I agree with the above discussed plan of care.    History of Present Illness     Kaya Connor is a 70 y.o. female who presents for a follow up office visit in regards to Back Pain.  Patient notes her pain has been gradually improving since her last office visit on 5/1/2025.  She continues to locate pain to the bilateral aspect of the low back, with right side worse than left.  She describes her pain as an intermittent dull, aching, throbbing, and pressure-like pain.  She rates her pain today 5/10 on a numeric rating scale.  Patient notes her pain is exacerbated with standing and thoracolumbar rotation.  Denies numbness, tingling, and weakness of bilateral lower extremities.  Admits to the use of acetaminophen and Robaxin for symptom management.  Patient states she tolerates Robaxin well without side effects.    Patient also noting chronic neck pain.  Denies recent injury or trauma to the area.  Patient locates her pain to the bilateral aspect of the neck, with right side worse than left.  Patient notes her pain is exacerbated with cervical extension.  Admits intermittent headache secondary to neck pain.  Denies numbness, tingling, and weakness of bilateral upper extremities.    Review of Systems   Respiratory:  Negative for shortness of breath.    Cardiovascular:  Negative for chest pain.   Gastrointestinal:  Negative for constipation, diarrhea, nausea and vomiting.   Musculoskeletal:  Positive for back pain, gait problem and myalgias. Negative for arthralgias and joint swelling.   Skin:  Negative for rash.   Neurological:  Negative for dizziness, seizures and weakness.   All other systems reviewed and are negative.      Medical History Reviewed by provider this encounter:     .  Medications Ordered Prior to  "Encounter[1]      Objective   Ht 5' 3\" (1.6 m)   Wt 70.3 kg (155 lb)   BMI 27.46 kg/m²      Pain Score:   5  Physical Exam  Constitutional: normal, well developed, well nourished, alert, in no distress and non-toxic and no overt pain behavior.  Eyes: anicteric  HEENT: grossly intact  Neck: supple, symmetric, trachea midline and no masses   Pulmonary: even and unlabored  Cardiovascular: No edema or pitting edema present  Skin: Normal without rashes or lesions and well hydrated  Psychiatric: Mood and affect appropriate  Neurologic: Cranial Nerves II-XII grossly intact  Musculoskeletal: normal, except for tenderness to palpation along bilateral lower lumbar facet joints, axial low back pain increased with lumbar extension and extension with rotation bilaterally, pain also noted along bilateral aspect of the neck    Radiology Results Review: I have reviewed radiology reports from 5/1/2025 including: xray(s).    XR spine lumbar minimum 4 views non injury: Result Notes     Allan Antonio DO  5/5/2025  3:20 PM EDT       Multilevel disc and facet degenerative changes of the lumbar spine without evidence of fracture or malalignment or instability.    Continue with current plan of care.             Study Result    Narrative & Impression   XR SPINE LUMBAR MINIMUM 4 VIEWS NON INJURY     INDICATION: M54.50: Low back pain, unspecified  G89.29: Other chronic pain.     COMPARISON: None     FINDINGS:     No acute fracture. Intact pedicles.     Five non-rib-bearing lumbar vertebral bodies.     Normal alignment seen on the neutral and extension and flexion lateral views. Of note, there seems to be somewhat limited mobility of the spine with limited flexion noted in particular.     There is moderate narrowing of L2-L3 disc. There is severe narrowing at L3-L4 and L4-L5. There is moderate narrowing at L5-S1. There is some sclerosis of the vertebral endplates and osteophytic spurring of the endplates of these levels. There is also "   advanced hypertrophic degenerative arthritis of the facet joints from L2-S1, but most prominent at L4-L5.     There appears to be a large gallstone in the right upper quadrant.     Right hip prosthesis noted.     IMPRESSION:     Multilevel disc and facet degenerative changes of the lumbar spine as detailed above, without evidence of fracture or malalignment or instability.     Incidental finding of gallstone        Computerized Assisted Algorithm (CAA) may have been used to analyze all applicable images.        Workstation performed: [id number]          [1]   Current Outpatient Medications on File Prior to Visit   Medication Sig Dispense Refill    Acetaminophen Extra Strength 500 MG TABS TAKE 2 TABLETS (1,000 MG TOTAL) BY MOUTH EVERY 8 HOURS 60 tablet 0    ascorbic acid (VITAMIN C) 500 MG tablet Take 1 tablet (500 mg total) by mouth 2 (two) times a day 30 tablet 1    cetirizine (ZyrTEC) 10 mg tablet Take 10 mg by mouth if needed for allergies      cholecalciferol (VITAMIN D3) 25 mcg (1,000 units) tablet Take 3,000 Units by mouth in the morning. 1200 Calcium - within the vitamin D3.      ezetimibe (ZETIA) 10 mg tablet Take 1 tablet (10 mg total) by mouth daily 90 tablet 3    gabapentin (NEURONTIN) 100 mg capsule 2 tablets daily at bedtime 180 capsule 1    Magnesium 250 MG TABS Take by mouth daily at bedtime      Multiple Vitamins-Minerals (multivitamin with minerals) tablet Take 1 tablet by mouth daily 30 tablet 1    acetaminophen (TYLENOL) 500 mg tablet Take 2 tablets (1,000 mg total) by mouth every 8 (eight) hours 60 tablet 0    aspirin (ECOTRIN LOW STRENGTH) 81 mg EC tablet Take 1 tablet (81 mg total) by mouth 2 (two) times a day 60 tablet 0    celecoxib (CeleBREX) 100 mg capsule Take 2 capsules (200 mg total) by mouth 2 (two) times a day 120 capsule 1    celecoxib (CeleBREX) 200 mg capsule Take 1 capsule (200 mg total) by mouth 2 (two) times a day 60 capsule 0    Cyanocobalamin (Vitamin B-12) 500 MCG SUBL Take  by mouth (Patient not taking: Reported on 6/3/2025)      folic acid (FOLVITE) 1 mg tablet Take 1 tablet (1 mg total) by mouth daily 30 tablet 1    mupirocin (BACTROBAN) 2 % ointment Apply topically to the inside of the left and right nostrils twice daily for 5 days before surgery, including the morning of surgery. 15 g 1    ondansetron (ZOFRAN-ODT) 4 mg disintegrating tablet Take 1 tablet (4 mg total) by mouth every 6 (six) hours as needed for nausea or vomiting (Patient not taking: Reported on 4/22/2025) 20 tablet 0    senna-docusate sodium (SENOKOT S) 8.6-50 mg per tablet Take 1 tablet by mouth daily 30 tablet 0    [Paused] tirzepatide (Zepbound) 7.5 mg/0.5 mL auto-injector Inject 7.5mg weekly Disp Vial (NDC not available in EMR) 2 mL 3    [DISCONTINUED] methocarbamol (ROBAXIN) 500 mg tablet Take 1 tablet (500 mg total) by mouth daily at bedtime 30 tablet 0     No current facility-administered medications on file prior to visit.

## 2025-06-25 ENCOUNTER — TELEPHONE (OUTPATIENT)
Dept: FAMILY MEDICINE CLINIC | Facility: CLINIC | Age: 70
End: 2025-06-25

## 2025-06-25 NOTE — TELEPHONE ENCOUNTER
Type of form: PT initial exam  via fax from [facility].    Forms have been placed in Yumiko Mack DO folder to be completed for clinical staff.     Routing to clinical pool.

## 2025-06-26 DIAGNOSIS — E66.811 OBESITY, CLASS I, BMI 30-34.9: ICD-10-CM

## 2025-06-26 DIAGNOSIS — E78.2 MIXED HYPERLIPIDEMIA: ICD-10-CM

## 2025-06-26 RX ORDER — TIRZEPATIDE 7.5 MG/.5ML
INJECTION, SOLUTION SUBCUTANEOUS
Qty: 2 ML | Refills: 0 | Status: SHIPPED | OUTPATIENT
Start: 2025-06-26

## 2025-06-27 ENCOUNTER — APPOINTMENT (OUTPATIENT)
Dept: RADIOLOGY | Facility: MEDICAL CENTER | Age: 70
End: 2025-06-27
Payer: MEDICARE

## 2025-06-27 DIAGNOSIS — M54.2 NECK PAIN: ICD-10-CM

## 2025-06-27 PROCEDURE — 72050 X-RAY EXAM NECK SPINE 4/5VWS: CPT

## 2025-07-07 ENCOUNTER — OFFICE VISIT (OUTPATIENT)
Dept: PAIN MEDICINE | Facility: MEDICAL CENTER | Age: 70
End: 2025-07-07
Payer: MEDICARE

## 2025-07-07 ENCOUNTER — TELEPHONE (OUTPATIENT)
Age: 70
End: 2025-07-07

## 2025-07-07 VITALS — WEIGHT: 154 LBS | HEIGHT: 63 IN | BODY MASS INDEX: 27.29 KG/M2

## 2025-07-07 DIAGNOSIS — G89.29 CHRONIC BILATERAL LOW BACK PAIN WITHOUT SCIATICA: ICD-10-CM

## 2025-07-07 DIAGNOSIS — M54.50 CHRONIC BILATERAL LOW BACK PAIN WITHOUT SCIATICA: ICD-10-CM

## 2025-07-07 PROCEDURE — 99213 OFFICE O/P EST LOW 20 MIN: CPT | Performed by: PHYSICAL MEDICINE & REHABILITATION

## 2025-07-07 RX ORDER — METHOCARBAMOL 500 MG/1
500 TABLET, FILM COATED ORAL
Qty: 30 TABLET | Refills: 2 | Status: SHIPPED | OUTPATIENT
Start: 2025-07-07 | End: 2025-08-06

## 2025-07-07 NOTE — PROGRESS NOTES
Name: Kaya Connor      : 1955      MRN: [id number]  Encounter Provider: Allan Antonio DO  Encounter Date: 2025   Encounter department: [facility] [place]  :  Assessment & Plan  Chronic bilateral low back pain without sciatica    Orders:    methocarbamol (ROBAXIN) 500 mg tablet; Take 1 tablet (500 mg total) by mouth daily at bedtime      At this time we will hold off on any interventional approaches.  She is managing her symptoms fairly well with conservative care including physical therapy, home stretching exercises, heat application.  She has noted some benefit from the muscle relaxers although they do not take effect as quickly as they once had.  We will provide refills for this.  She may follow-up with us on an as-needed basis at this point.      My impressions and treatment recommendations were discussed in detail with the patient who verbalized understanding and had no further questions.  Discharge instructions were provided. I personally saw and examined the patient and I agree with the above discussed plan of care.    History of Present Illness     Kaya Connor is a 70 y.o. female who presents for a follow up office visit in regards to neck and back pain.  We did obtain cervical spine x-rays which reveal some mild degenerative changes throughout and some foraminal narrowing.  The patient does not describe any significant radicular features.  Overall she is feeling better and rates the pain as a 3/10 with occasional dull aching type pain.  This is also described as pressure-like.  She has done some activity modification and is planning her days out utilizing oral analgesics appropriately for increased activity such as when visiting [facility] with her granddaughter.              Opioid contract date    Last UDS Date: No results in last 5 years                    last taken on    Review of Systems   Constitutional:  Negative for fatigue and unexpected weight change.   HENT:   "Negative for ear pain, hearing loss and sinus pain.    Eyes:  Negative for redness and visual disturbance.   Respiratory:  Negative for shortness of breath and wheezing.    Cardiovascular:  Negative for chest pain and palpitations.   Gastrointestinal:  Negative for abdominal pain, diarrhea and rectal pain.   Endocrine: Negative for polydipsia and polyuria.   Genitourinary:  Negative for difficulty urinating and frequency.   Musculoskeletal:  Positive for neck pain. Negative for gait problem and myalgias.   Skin:  Negative for rash.   Allergic/Immunologic: Negative for food allergies.   Neurological:  Negative for numbness and headaches.   Psychiatric/Behavioral:  Negative for decreased concentration and sleep disturbance. The patient is not nervous/anxious.        Medical History Reviewed by provider this encounter:     .       Objective   Ht 5' 3\" (1.6 m)   Wt 69.9 kg (154 lb)   BMI 27.28 kg/m²      Pain Score:   3  Physical Exam  Constitutional: normal, well developed, well nourished, alert, in no distress and non-toxic and no overt pain behavior.  Eyes: anicteric  HEENT: grossly intact  Neck: supple, symmetric, trachea midline and no masses   Pulmonary: even and unlabored  Cardiovascular: No edema or pitting edema present  Skin: Normal without rashes or lesions and well hydrated  Psychiatric: Mood and affect appropriate  Neurologic: Cranial Nerves II-XII grossly intact  Musculoskeletal: normal, except for some mild occasional pain in the neck and lower back          "

## 2025-07-07 NOTE — TELEPHONE ENCOUNTER
Patient called in inquiring about lab order patient states that she normally takes labs every six months patient does have an appointment 8/5 Please Advise

## 2025-07-07 NOTE — TELEPHONE ENCOUNTER
I called and spoke with the patient, does not look like she is due for any labs at this time patient verbalized understanding.

## 2025-07-08 ENCOUNTER — OFFICE VISIT (OUTPATIENT)
Dept: BARIATRICS | Facility: CLINIC | Age: 70
End: 2025-07-08
Payer: MEDICARE

## 2025-07-08 VITALS
TEMPERATURE: 97.6 F | WEIGHT: 153.4 LBS | HEIGHT: 63 IN | DIASTOLIC BLOOD PRESSURE: 82 MMHG | HEART RATE: 69 BPM | BODY MASS INDEX: 27.18 KG/M2 | RESPIRATION RATE: 16 BRPM | SYSTOLIC BLOOD PRESSURE: 116 MMHG

## 2025-07-08 DIAGNOSIS — E78.2 MIXED HYPERLIPIDEMIA: ICD-10-CM

## 2025-07-08 DIAGNOSIS — E66.811 OBESITY, CLASS I, BMI 30-34.9: Primary | ICD-10-CM

## 2025-07-08 PROCEDURE — 99214 OFFICE O/P EST MOD 30 MIN: CPT | Performed by: PHYSICIAN ASSISTANT

## 2025-07-08 NOTE — ASSESSMENT & PLAN NOTE
History of Obesity, BMI now in overweight range at 27.17  She has had 21.54% weight loss since starting treatment with Zepbound.   Weight has been stable the past 1-2 months and she is tolerating medication without side effects and would like to Increase Zepbound 10mg weekly  Discussed long term plan-- when she reaches goal of ~140 pounds, she would consider lowering dose of medication or spacing interval between injections if she finds this to be effective to maintain weight.   Continue regular exercise, discussed importance of strength training.   Discussed body comp testing-- she is interested.

## 2025-07-08 NOTE — PROGRESS NOTES
Assessment & Plan  Obesity, Class I, BMI 30-34.9  History of Obesity, BMI now in overweight range at 27.17  She has had 21.54% weight loss since starting treatment with Zepbound.   Weight has been stable the past 1-2 months and she is tolerating medication without side effects and would like to Increase Zepbound 10mg weekly  Discussed long term plan-- when she reaches goal of ~140 pounds, she would consider lowering dose of medication or spacing interval between injections if she finds this to be effective to maintain weight.   Continue regular exercise, discussed importance of strength training.   Discussed body comp testing-- she is interested.   Mixed hyperlipidemia  This has improved  Following with PCP, taking zetia   Continue lifestyle modification.        Return in about 4 months (around 11/8/2025) for Body Comp testing, today if possible. .       Subjective:   Chief Complaint   Patient presents with   • Follow-up     MWM F/u; Waist 33in       Patient ID: Kaya Connor  is a 70 y.o. female with excess weight/obesity here for Weight Management Follow up Visit    HPI: Here for Medical Weight Management Follow Up Visit    Obesity/Excess Weight:  Current BMI: Body mass index is 27.17 kg/m².   Initial Weight: 195  Last visit Weight: 163  Current Weight: 153  *42 pound weight loss    Current Weight Management Plan:   Current Medication: Zepbound 7.5mg weekly via lam direct.   Medication Side effects:  None  Calorie Intake:  Aiming for 100g protein per day     Since last visit, had hip surgery.  Did too much too soon-- injured back.  Had finished PT, now doing PT for back.     *has had picnics two weeks ago, ate different foods than usual, but did not go overboard.     Food Recall:  Breakfast: Protein shake, fruit (1/2 banana)  Homemade, atkins, or fairlire  Lunch: greek Yogurt, fruit  PM Snack: occasionally a shake  Dinner: Grilled chicken with Salad around 2PM, often will eat from 4-6PM    Lifestyle  "History:  Exercise: Goes in pool daily, does water exercises.   Walks daily -- can now walk over a mile, likes to go to [employer] and [employer]  Stretching in AM, core exercise, power plate.   Does some weights.         Wt Readings from Last 20 Encounters:   07/08/25 69.6 kg (153 lb 6.4 oz)   07/07/25 69.9 kg (154 lb)   06/04/25 70.3 kg (155 lb)   06/03/25 72.1 kg (159 lb)   05/01/25 72.1 kg (159 lb)   04/22/25 72.1 kg (159 lb)   03/24/25 72.1 kg (159 lb)   03/06/25 72.1 kg (159 lb)   03/05/25 74 kg (163 lb 3.2 oz)   02/19/25 75 kg (165 lb 6.4 oz)   02/17/25 74.4 kg (164 lb)   02/11/25 74.6 kg (164 lb 6.4 oz)   02/07/25 73.6 kg (162 lb 3.2 oz)   02/04/25 76.2 kg (168 lb)   01/20/25 76.2 kg (168 lb)   01/14/25 76.2 kg (168 lb)   01/13/25 76.7 kg (169 lb)   12/10/24 76.7 kg (169 lb)   12/10/24 76.8 kg (169 lb 6.4 oz)   10/09/24 81.9 kg (180 lb 9.6 oz)        Review of Systems    Past Medical History[1]    Past Surgical History[2]     Allergies   Allergen Reactions   • Statins Myalgia        Objective:    /82 (BP Location: Left arm, Patient Position: Sitting)   Pulse 69   Temp 97.6 °F (36.4 °C) (Tympanic)   Resp 16   Ht 5' 3\" (1.6 m)   Wt 69.6 kg (153 lb 6.4 oz)   BMI 27.17 kg/m²     Physical Exam:  Constitutional:  she  appears well-developed and well-nourished. No distress.   HENT:   Head: Normocephalic and atraumatic.   Neck: Normal range of motion.   Pulmonary/Chest: Effort normal.   Musculoskeletal: Normal range of motion.   Neurological: she  is alert and oriented to person, place, and time.   Skin: she  is not diaphoretic.   Psychiatric: she  has a normal mood and affect. her  behavior is normal.        LABS:    Lab Results   Component Value Date    HGBA1C 5.0 01/24/2025      Lab Results   Component Value Date    SODIUM 140 03/07/2025    K 4.0 03/07/2025     03/07/2025    CO2 24 03/07/2025    AGAP 8 03/07/2025    BUN 16 03/07/2025    CREATININE 0.77 03/07/2025    GLUC 123 03/07/2025    " GLUF 82 01/24/2025    CALCIUM 8.8 03/07/2025    AST 16 01/24/2025    ALT 12 01/24/2025    ALKPHOS 57 01/24/2025    TP 6.7 01/24/2025    TBILI 0.47 01/24/2025    EGFR 79 03/07/2025 01/24/2025   Lab Results   Component Value Date    DIW9WUYQUTLF 1.644 05/21/2024    TSH 1.36 10/23/2020             [1]  Past Medical History:  Diagnosis Date   • Achilles tendinitis     Left ankle   • Allergic Seasonal   • Anesthesia     2/12/25 per pt - reports BP low with use of anesthesia   • Arthritis Back and hips   • Fibromyalgia, primary     hx of fibromyalgia   • Generalized anxiety disorder 06/06/2019   • Hip pain     RIGHT -  discomfort   • Hyperlipidemia     last assessed - [id number]   • Lumbar degenerative disc disease    • Osteoarthritis    • Sciatica, left side     last assessed - [id number]   [2]  Past Surgical History:  Procedure Laterality Date   • ARTHROPLASTY HIP TOTAL ANTERIOR Right 3/6/2025    Procedure: ARTHROPLASTY HIP TOTAL ANTERIOR,NAVIGATED;  Surgeon: Oleg Sotelo DO;  Location: [facility];  Service: Orthopedics   • COLONOSCOPY      Complete colonoscopy - nml; resolved - [id number]   • DENTAL SURGERY      tooth- titanium implant - used liquid versed per pt   • LUMBAR EPIDURAL INJECTION      child delivery   • OTHER SURGICAL HISTORY      nasal surgery to remove basal cell lesion

## 2025-07-23 ENCOUNTER — CLINICAL SUPPORT (OUTPATIENT)
Dept: BARIATRICS | Facility: CLINIC | Age: 70
End: 2025-07-23

## 2025-07-23 VITALS — WEIGHT: 153.4 LBS | HEIGHT: 63 IN | BODY MASS INDEX: 27.18 KG/M2

## 2025-07-23 DIAGNOSIS — R63.5 ABNORMAL WEIGHT GAIN: Primary | ICD-10-CM

## 2025-07-23 PROCEDURE — WEIGHT

## 2025-07-23 PROCEDURE — RECHECK

## 2025-07-30 DIAGNOSIS — E78.5 DYSLIPIDEMIA: ICD-10-CM

## 2025-07-31 RX ORDER — EZETIMIBE 10 MG/1
10 TABLET ORAL DAILY
Qty: 90 TABLET | Refills: 1 | Status: SHIPPED | OUTPATIENT
Start: 2025-07-31

## 2025-08-01 ENCOUNTER — TELEPHONE (OUTPATIENT)
Dept: ADMINISTRATIVE | Facility: OTHER | Age: 70
End: 2025-08-01

## 2025-08-05 ENCOUNTER — OFFICE VISIT (OUTPATIENT)
Dept: FAMILY MEDICINE CLINIC | Facility: CLINIC | Age: 70
End: 2025-08-05
Payer: MEDICARE

## 2025-08-05 VITALS
WEIGHT: 151.2 LBS | DIASTOLIC BLOOD PRESSURE: 82 MMHG | BODY MASS INDEX: 27.82 KG/M2 | SYSTOLIC BLOOD PRESSURE: 118 MMHG | HEIGHT: 62 IN | TEMPERATURE: 97.3 F | HEART RATE: 107 BPM | OXYGEN SATURATION: 97 %

## 2025-08-05 DIAGNOSIS — M51.360 DEGENERATION OF INTERVERTEBRAL DISC OF LUMBAR REGION WITH DISCOGENIC BACK PAIN: ICD-10-CM

## 2025-08-05 DIAGNOSIS — E78.2 MIXED HYPERLIPIDEMIA: Primary | ICD-10-CM

## 2025-08-05 DIAGNOSIS — E66.3 OVERWEIGHT WITH BODY MASS INDEX (BMI) OF 27 TO 27.9 IN ADULT: ICD-10-CM

## 2025-08-05 DIAGNOSIS — J01.40 ACUTE NON-RECURRENT PANSINUSITIS: ICD-10-CM

## 2025-08-05 DIAGNOSIS — G25.81 RESTLESS LEG: ICD-10-CM

## 2025-08-05 PROBLEM — R51.9 HEADACHE: Status: RESOLVED | Noted: 2023-12-12 | Resolved: 2025-08-05

## 2025-08-05 PROBLEM — E66.811 OBESITY, CLASS I, BMI 30-34.9: Status: RESOLVED | Noted: 2022-07-14 | Resolved: 2025-08-05

## 2025-08-05 PROBLEM — Z85.828 HISTORY OF MALIGNANT NEOPLASM OF SKIN: Status: ACTIVE | Noted: 2018-06-19

## 2025-08-05 PROBLEM — Z47.1 AFTERCARE FOLLOWING RIGHT HIP JOINT REPLACEMENT SURGERY: Status: RESOLVED | Noted: 2025-03-24 | Resolved: 2025-08-05

## 2025-08-05 PROBLEM — M19.019 OSTEOARTHRITIS OF SHOULDER REGION: Status: ACTIVE | Noted: 2025-08-05

## 2025-08-05 PROBLEM — Z96.641 AFTERCARE FOLLOWING RIGHT HIP JOINT REPLACEMENT SURGERY: Status: RESOLVED | Noted: 2025-03-24 | Resolved: 2025-08-05

## 2025-08-05 PROCEDURE — G2211 COMPLEX E/M VISIT ADD ON: HCPCS | Performed by: FAMILY MEDICINE

## 2025-08-05 PROCEDURE — 99214 OFFICE O/P EST MOD 30 MIN: CPT | Performed by: FAMILY MEDICINE

## 2025-08-05 RX ORDER — AMOXICILLIN 875 MG/1
875 TABLET, COATED ORAL 2 TIMES DAILY
Qty: 20 TABLET | Refills: 0 | Status: SHIPPED | OUTPATIENT
Start: 2025-08-05 | End: 2025-08-15

## 2025-08-15 ENCOUNTER — APPOINTMENT (OUTPATIENT)
Dept: LAB | Facility: MEDICAL CENTER | Age: 70
End: 2025-08-15
Payer: MEDICARE

## 2025-08-17 DIAGNOSIS — G25.81 RESTLESS LEG: ICD-10-CM

## 2025-08-18 DIAGNOSIS — G25.81 RESTLESS LEG: ICD-10-CM

## 2025-08-19 RX ORDER — GABAPENTIN 100 MG/1
200 CAPSULE ORAL
Qty: 180 CAPSULE | Refills: 1 | Status: SHIPPED | OUTPATIENT
Start: 2025-08-19

## 2025-08-20 RX ORDER — GABAPENTIN 100 MG/1
CAPSULE ORAL
Qty: 180 CAPSULE | Refills: 0 | OUTPATIENT
Start: 2025-08-20

## (undated) DEVICE — DECANTER: Brand: UNBRANDED

## (undated) DEVICE — MAT ABSORBANT ARTHROSCOPY FLOOR 46 X 40 IN

## (undated) DEVICE — 3M™ STERI-DRAPE™ U-DRAPE 1015: Brand: STERI-DRAPE™

## (undated) DEVICE — SPECIMEN CONTAINER STERILE PEEL PACK

## (undated) DEVICE — ADHESIVE SKIN CLOSURE SYS EXOFIN FUSION 22CM

## (undated) DEVICE — SYRINGE 50ML LL

## (undated) DEVICE — ELECTRODE BLADE E-Z CLEAN 4IN -0014A

## (undated) DEVICE — GLOVE SRG BIOGEL ORTHOPEDIC 8.5

## (undated) DEVICE — GLOVE INDICATOR PI UNDERGLOVE SZ 6.5 BLUE

## (undated) DEVICE — GLOVE SRG BIOGEL 8.5

## (undated) DEVICE — POSITIONER HANA TABLE PACK

## (undated) DEVICE — DRAPE EQUIPMENT RF WAND

## (undated) DEVICE — BETHLEHEM TOTAL HIP, KIT: Brand: CARDINAL HEALTH

## (undated) DEVICE — BAG POLY CLEAR 12 X 8 X 30

## (undated) DEVICE — C-ARM: Brand: UNBRANDED

## (undated) DEVICE — SAW BLADE OSCILLATING BRAZOL 167

## (undated) DEVICE — PLUMEPEN PRO 10FT

## (undated) DEVICE — CAPIT HIP COP EMPHASYS CMNT/NON-CMNT

## (undated) DEVICE — 6617 IOBAN II PATIENT ISOLATION DRAPE 5/BX,4BX/CS: Brand: STERI-DRAPE™ IOBAN™ 2

## (undated) DEVICE — HOOD: Brand: T7PLUS

## (undated) DEVICE — SURGICAL GOWN, XL SMARTSLEEVE: Brand: CONVERTORS

## (undated) DEVICE — PREP SURGICAL PURPREP 26ML

## (undated) DEVICE — ANTIBACTERIAL UNDYED BRAIDED (POLYGLACTIN 910), SYNTHETIC ABSORBABLE SUTURE: Brand: COATED VICRYL

## (undated) DEVICE — WEBRIL 6 IN UNSTERILE

## (undated) DEVICE — SUT STRATAFIX SPIRAL PDS PLUS 1 CTX 18 IN SXPP1A400

## (undated) DEVICE — HANDPIECE SET WITH RETRACTABLE COAXIAL FAN SPRAY TIP AND SUCTION TUBE: Brand: INTERPULSE

## (undated) DEVICE — 450 ML BOTTLE OF 0.05% CHLORHEXIDINE GLUCONATE IN 99.95% STERILE WATER FOR IRRIGATION, USP AND APPLICATOR.: Brand: IRRISEPT ANTIMICROBIAL WOUND LAVAGE

## (undated) DEVICE — SPONGE SCRUB 4 PCT CHLORHEXIDINE

## (undated) DEVICE — GLOVE SRG BIOGEL 6.5

## (undated) DEVICE — GROUNDING PAD RFL

## (undated) DEVICE — SUT STRATAFIX SPIRAL MONOCRYL PLUS 4-0 PS-2 45CM SXMP1B118

## (undated) DEVICE — GLOVE INDICATOR PI UNDERGLOVE SZ 8.5 BLUE

## (undated) DEVICE — NEEDLE 18 G X 1 1/2